# Patient Record
Sex: MALE | Race: WHITE | NOT HISPANIC OR LATINO
[De-identification: names, ages, dates, MRNs, and addresses within clinical notes are randomized per-mention and may not be internally consistent; named-entity substitution may affect disease eponyms.]

---

## 2020-12-10 DIAGNOSIS — Z01.818 ENCOUNTER FOR OTHER PREPROCEDURAL EXAMINATION: ICD-10-CM

## 2020-12-11 ENCOUNTER — APPOINTMENT (OUTPATIENT)
Dept: DISASTER EMERGENCY | Facility: CLINIC | Age: 75
End: 2020-12-11

## 2020-12-13 LAB — SARS-COV-2 N GENE NPH QL NAA+PROBE: NOT DETECTED

## 2020-12-14 ENCOUNTER — INPATIENT (INPATIENT)
Facility: HOSPITAL | Age: 75
LOS: 7 days | Discharge: HOME | DRG: 233 | End: 2020-12-22
Attending: THORACIC SURGERY (CARDIOTHORACIC VASCULAR SURGERY) | Admitting: THORACIC SURGERY (CARDIOTHORACIC VASCULAR SURGERY)
Payer: COMMERCIAL

## 2020-12-14 ENCOUNTER — OUTPATIENT (OUTPATIENT)
Dept: OUTPATIENT SERVICES | Facility: HOSPITAL | Age: 75
LOS: 1 days | Discharge: ANOTHER HOSPITAL | End: 2020-12-14
Payer: MEDICARE

## 2020-12-14 VITALS — HEIGHT: 68 IN | WEIGHT: 182.98 LBS

## 2020-12-14 VITALS
HEIGHT: 68 IN | WEIGHT: 184.31 LBS | HEART RATE: 83 BPM | RESPIRATION RATE: 18 BRPM | OXYGEN SATURATION: 96 % | SYSTOLIC BLOOD PRESSURE: 168 MMHG | TEMPERATURE: 99 F | DIASTOLIC BLOOD PRESSURE: 85 MMHG

## 2020-12-14 DIAGNOSIS — Z98.890 OTHER SPECIFIED POSTPROCEDURAL STATES: Chronic | ICD-10-CM

## 2020-12-14 DIAGNOSIS — I25.721 ATHEROSCLEROSIS OF AUTOLOGOUS ARTERY CORONARY ARTERY BYPASS GRAFT(S) WITH ANGINA PECTORIS WITH DOCUMENTED SPASM: ICD-10-CM

## 2020-12-14 DIAGNOSIS — I10 ESSENTIAL (PRIMARY) HYPERTENSION: ICD-10-CM

## 2020-12-14 DIAGNOSIS — Z90.49 ACQUIRED ABSENCE OF OTHER SPECIFIED PARTS OF DIGESTIVE TRACT: Chronic | ICD-10-CM

## 2020-12-14 DIAGNOSIS — E11.9 TYPE 2 DIABETES MELLITUS WITHOUT COMPLICATIONS: ICD-10-CM

## 2020-12-14 DIAGNOSIS — I25.10 ATHEROSCLEROTIC HEART DISEASE OF NATIVE CORONARY ARTERY WITHOUT ANGINA PECTORIS: ICD-10-CM

## 2020-12-14 LAB
A1C WITH ESTIMATED AVERAGE GLUCOSE RESULT: 7 % — HIGH (ref 4–5.6)
ALBUMIN SERPL ELPH-MCNC: 4.2 G/DL — SIGNIFICANT CHANGE UP (ref 3.3–5)
ALBUMIN SERPL ELPH-MCNC: 4.2 G/DL — SIGNIFICANT CHANGE UP (ref 3.3–5)
ALP SERPL-CCNC: 104 U/L — SIGNIFICANT CHANGE UP (ref 40–120)
ALP SERPL-CCNC: 117 U/L — SIGNIFICANT CHANGE UP (ref 40–120)
ALT FLD-CCNC: 18 U/L — SIGNIFICANT CHANGE UP (ref 10–45)
ALT FLD-CCNC: 23 U/L — SIGNIFICANT CHANGE UP (ref 4–41)
ANION GAP SERPL CALC-SCNC: 10 MMOL/L — SIGNIFICANT CHANGE UP (ref 5–17)
ANION GAP SERPL CALC-SCNC: 14 MMOL/L — SIGNIFICANT CHANGE UP (ref 7–14)
APPEARANCE UR: CLEAR — SIGNIFICANT CHANGE UP
APTT BLD: 29.3 SEC — SIGNIFICANT CHANGE UP (ref 27.5–35.5)
AST SERPL-CCNC: 21 U/L — SIGNIFICANT CHANGE UP (ref 10–40)
AST SERPL-CCNC: 22 U/L — SIGNIFICANT CHANGE UP (ref 4–40)
BASOPHILS # BLD AUTO: 0.03 K/UL — SIGNIFICANT CHANGE UP (ref 0–0.2)
BASOPHILS # BLD AUTO: 0.04 K/UL — SIGNIFICANT CHANGE UP (ref 0–0.2)
BASOPHILS NFR BLD AUTO: 0.7 % — SIGNIFICANT CHANGE UP (ref 0–2)
BASOPHILS NFR BLD AUTO: 0.7 % — SIGNIFICANT CHANGE UP (ref 0–2)
BILIRUB SERPL-MCNC: 0.6 MG/DL — SIGNIFICANT CHANGE UP (ref 0.2–1.2)
BILIRUB SERPL-MCNC: 0.7 MG/DL — SIGNIFICANT CHANGE UP (ref 0.2–1.2)
BILIRUB UR-MCNC: NEGATIVE — SIGNIFICANT CHANGE UP
BLD GP AB SCN SERPL QL: NEGATIVE — SIGNIFICANT CHANGE UP
BUN SERPL-MCNC: 16 MG/DL — SIGNIFICANT CHANGE UP (ref 7–23)
BUN SERPL-MCNC: 18 MG/DL — SIGNIFICANT CHANGE UP (ref 7–23)
CALCIUM SERPL-MCNC: 9.4 MG/DL — SIGNIFICANT CHANGE UP (ref 8.4–10.5)
CALCIUM SERPL-MCNC: 9.5 MG/DL — SIGNIFICANT CHANGE UP (ref 8.4–10.5)
CHLORIDE SERPL-SCNC: 105 MMOL/L — SIGNIFICANT CHANGE UP (ref 98–107)
CHLORIDE SERPL-SCNC: 106 MMOL/L — SIGNIFICANT CHANGE UP (ref 96–108)
CO2 SERPL-SCNC: 23 MMOL/L — SIGNIFICANT CHANGE UP (ref 22–31)
CO2 SERPL-SCNC: 24 MMOL/L — SIGNIFICANT CHANGE UP (ref 22–31)
COLOR SPEC: SIGNIFICANT CHANGE UP
CREAT SERPL-MCNC: 0.99 MG/DL — SIGNIFICANT CHANGE UP (ref 0.5–1.3)
CREAT SERPL-MCNC: 1.14 MG/DL — SIGNIFICANT CHANGE UP (ref 0.5–1.3)
DIFF PNL FLD: NEGATIVE — SIGNIFICANT CHANGE UP
EOSINOPHIL # BLD AUTO: 0.14 K/UL — SIGNIFICANT CHANGE UP (ref 0–0.5)
EOSINOPHIL # BLD AUTO: 0.16 K/UL — SIGNIFICANT CHANGE UP (ref 0–0.5)
EOSINOPHIL NFR BLD AUTO: 2.3 % — SIGNIFICANT CHANGE UP (ref 0–6)
EOSINOPHIL NFR BLD AUTO: 3.7 % — SIGNIFICANT CHANGE UP (ref 0–6)
ESTIMATED AVERAGE GLUCOSE: 154 MG/DL — HIGH (ref 68–114)
FIBRINOGEN PPP-MCNC: 398 MG/DL — SIGNIFICANT CHANGE UP (ref 290–520)
GLUCOSE BLDC GLUCOMTR-MCNC: 141 MG/DL — HIGH (ref 70–99)
GLUCOSE SERPL-MCNC: 132 MG/DL — HIGH (ref 70–99)
GLUCOSE SERPL-MCNC: 156 MG/DL — HIGH (ref 70–99)
GLUCOSE UR QL: NEGATIVE — SIGNIFICANT CHANGE UP
HCT VFR BLD CALC: 43.4 % — SIGNIFICANT CHANGE UP (ref 39–50)
HCT VFR BLD CALC: 43.8 % — SIGNIFICANT CHANGE UP (ref 39–50)
HGB BLD-MCNC: 14.1 G/DL — SIGNIFICANT CHANGE UP (ref 13–17)
HGB BLD-MCNC: 14.5 G/DL — SIGNIFICANT CHANGE UP (ref 13–17)
IANC: 2.21 K/UL — SIGNIFICANT CHANGE UP (ref 1.5–8.5)
IMM GRANULOCYTES NFR BLD AUTO: 0.2 % — SIGNIFICANT CHANGE UP (ref 0–1.5)
IMM GRANULOCYTES NFR BLD AUTO: 0.3 % — SIGNIFICANT CHANGE UP (ref 0–1.5)
INR BLD: 1.02 RATIO — SIGNIFICANT CHANGE UP (ref 0.88–1.16)
KETONES UR-MCNC: NEGATIVE — SIGNIFICANT CHANGE UP
LEUKOCYTE ESTERASE UR-ACNC: NEGATIVE — SIGNIFICANT CHANGE UP
LYMPHOCYTES # BLD AUTO: 1.42 K/UL — SIGNIFICANT CHANGE UP (ref 1–3.3)
LYMPHOCYTES # BLD AUTO: 1.6 K/UL — SIGNIFICANT CHANGE UP (ref 1–3.3)
LYMPHOCYTES # BLD AUTO: 23.8 % — SIGNIFICANT CHANGE UP (ref 13–44)
LYMPHOCYTES # BLD AUTO: 37 % — SIGNIFICANT CHANGE UP (ref 13–44)
MAGNESIUM SERPL-MCNC: 2.1 MG/DL — SIGNIFICANT CHANGE UP (ref 1.6–2.6)
MCHC RBC-ENTMCNC: 28.9 PG — SIGNIFICANT CHANGE UP (ref 27–34)
MCHC RBC-ENTMCNC: 29.2 PG — SIGNIFICANT CHANGE UP (ref 27–34)
MCHC RBC-ENTMCNC: 32.5 GM/DL — SIGNIFICANT CHANGE UP (ref 32–36)
MCHC RBC-ENTMCNC: 33.1 GM/DL — SIGNIFICANT CHANGE UP (ref 32–36)
MCV RBC AUTO: 87.3 FL — SIGNIFICANT CHANGE UP (ref 80–100)
MCV RBC AUTO: 89.9 FL — SIGNIFICANT CHANGE UP (ref 80–100)
MONOCYTES # BLD AUTO: 0.32 K/UL — SIGNIFICANT CHANGE UP (ref 0–0.9)
MONOCYTES # BLD AUTO: 0.41 K/UL — SIGNIFICANT CHANGE UP (ref 0–0.9)
MONOCYTES NFR BLD AUTO: 6.9 % — SIGNIFICANT CHANGE UP (ref 2–14)
MONOCYTES NFR BLD AUTO: 7.4 % — SIGNIFICANT CHANGE UP (ref 2–14)
NEUTROPHILS # BLD AUTO: 2.21 K/UL — SIGNIFICANT CHANGE UP (ref 1.8–7.4)
NEUTROPHILS # BLD AUTO: 3.94 K/UL — SIGNIFICANT CHANGE UP (ref 1.8–7.4)
NEUTROPHILS NFR BLD AUTO: 51 % — SIGNIFICANT CHANGE UP (ref 43–77)
NEUTROPHILS NFR BLD AUTO: 66 % — SIGNIFICANT CHANGE UP (ref 43–77)
NITRITE UR-MCNC: NEGATIVE — SIGNIFICANT CHANGE UP
NRBC # BLD: 0 /100 WBCS — SIGNIFICANT CHANGE UP
NRBC # BLD: 0 /100 WBCS — SIGNIFICANT CHANGE UP (ref 0–0)
NRBC # FLD: 0 K/UL — SIGNIFICANT CHANGE UP
NT-PROBNP SERPL-SCNC: 124 PG/ML — SIGNIFICANT CHANGE UP (ref 0–300)
PH UR: 7 — SIGNIFICANT CHANGE UP (ref 5–8)
PHOSPHATE SERPL-MCNC: 3.6 MG/DL — SIGNIFICANT CHANGE UP (ref 2.5–4.5)
PLATELET # BLD AUTO: 168 K/UL — SIGNIFICANT CHANGE UP (ref 150–400)
PLATELET # BLD AUTO: 170 K/UL — SIGNIFICANT CHANGE UP (ref 150–400)
POTASSIUM SERPL-MCNC: 3.8 MMOL/L — SIGNIFICANT CHANGE UP (ref 3.5–5.3)
POTASSIUM SERPL-MCNC: 4.1 MMOL/L — SIGNIFICANT CHANGE UP (ref 3.5–5.3)
POTASSIUM SERPL-SCNC: 3.8 MMOL/L — SIGNIFICANT CHANGE UP (ref 3.5–5.3)
POTASSIUM SERPL-SCNC: 4.1 MMOL/L — SIGNIFICANT CHANGE UP (ref 3.5–5.3)
PROT SERPL-MCNC: 6.8 G/DL — SIGNIFICANT CHANGE UP (ref 6–8.3)
PROT SERPL-MCNC: 7.8 G/DL — SIGNIFICANT CHANGE UP (ref 6–8.3)
PROT UR-MCNC: SIGNIFICANT CHANGE UP
PROTHROM AB SERPL-ACNC: 12.2 SEC — SIGNIFICANT CHANGE UP (ref 10.6–13.6)
RBC # BLD: 4.83 M/UL — SIGNIFICANT CHANGE UP (ref 4.2–5.8)
RBC # BLD: 5.02 M/UL — SIGNIFICANT CHANGE UP (ref 4.2–5.8)
RBC # FLD: 12.5 % — SIGNIFICANT CHANGE UP (ref 10.3–14.5)
RBC # FLD: 12.5 % — SIGNIFICANT CHANGE UP (ref 10.3–14.5)
RH IG SCN BLD-IMP: POSITIVE — SIGNIFICANT CHANGE UP
SODIUM SERPL-SCNC: 140 MMOL/L — SIGNIFICANT CHANGE UP (ref 135–145)
SODIUM SERPL-SCNC: 142 MMOL/L — SIGNIFICANT CHANGE UP (ref 135–145)
SP GR SPEC: 1.03 — HIGH (ref 1.01–1.02)
UROBILINOGEN FLD QL: NEGATIVE — SIGNIFICANT CHANGE UP
WBC # BLD: 4.33 K/UL — SIGNIFICANT CHANGE UP (ref 3.8–10.5)
WBC # BLD: 5.97 K/UL — SIGNIFICANT CHANGE UP (ref 3.8–10.5)
WBC # FLD AUTO: 4.33 K/UL — SIGNIFICANT CHANGE UP (ref 3.8–10.5)
WBC # FLD AUTO: 5.97 K/UL — SIGNIFICANT CHANGE UP (ref 3.8–10.5)

## 2020-12-14 PROCEDURE — 71046 X-RAY EXAM CHEST 2 VIEWS: CPT | Mod: 26

## 2020-12-14 PROCEDURE — 93010 ELECTROCARDIOGRAM REPORT: CPT

## 2020-12-14 PROCEDURE — 99222 1ST HOSP IP/OBS MODERATE 55: CPT

## 2020-12-14 RX ORDER — AMLODIPINE BESYLATE 2.5 MG/1
10 TABLET ORAL DAILY
Refills: 0 | Status: DISCONTINUED | OUTPATIENT
Start: 2020-12-14 | End: 2020-12-17

## 2020-12-14 RX ORDER — SODIUM CHLORIDE 9 MG/ML
1000 INJECTION INTRAMUSCULAR; INTRAVENOUS; SUBCUTANEOUS
Refills: 0 | Status: DISCONTINUED | OUTPATIENT
Start: 2020-12-14 | End: 2020-12-28

## 2020-12-14 RX ORDER — SIMVASTATIN 20 MG/1
20 TABLET, FILM COATED ORAL AT BEDTIME
Refills: 0 | Status: DISCONTINUED | OUTPATIENT
Start: 2020-12-14 | End: 2020-12-16

## 2020-12-14 RX ORDER — DEXTROSE 50 % IN WATER 50 %
25 SYRINGE (ML) INTRAVENOUS ONCE
Refills: 0 | Status: DISCONTINUED | OUTPATIENT
Start: 2020-12-14 | End: 2020-12-17

## 2020-12-14 RX ORDER — GLUCAGON INJECTION, SOLUTION 0.5 MG/.1ML
1 INJECTION, SOLUTION SUBCUTANEOUS ONCE
Refills: 0 | Status: DISCONTINUED | OUTPATIENT
Start: 2020-12-14 | End: 2020-12-17

## 2020-12-14 RX ORDER — TAMSULOSIN HYDROCHLORIDE 0.4 MG/1
0.4 CAPSULE ORAL AT BEDTIME
Refills: 0 | Status: DISCONTINUED | OUTPATIENT
Start: 2020-12-14 | End: 2020-12-17

## 2020-12-14 RX ORDER — SODIUM CHLORIDE 9 MG/ML
3 INJECTION INTRAMUSCULAR; INTRAVENOUS; SUBCUTANEOUS EVERY 8 HOURS
Refills: 0 | Status: DISCONTINUED | OUTPATIENT
Start: 2020-12-14 | End: 2020-12-28

## 2020-12-14 RX ORDER — INFLUENZA VIRUS VACCINE 15; 15; 15; 15 UG/.5ML; UG/.5ML; UG/.5ML; UG/.5ML
0.5 SUSPENSION INTRAMUSCULAR ONCE
Refills: 0 | Status: DISCONTINUED | OUTPATIENT
Start: 2020-12-14 | End: 2020-12-17

## 2020-12-14 RX ORDER — INSULIN LISPRO 100/ML
VIAL (ML) SUBCUTANEOUS
Refills: 0 | Status: DISCONTINUED | OUTPATIENT
Start: 2020-12-14 | End: 2020-12-17

## 2020-12-14 RX ORDER — DEXTROSE 50 % IN WATER 50 %
12.5 SYRINGE (ML) INTRAVENOUS ONCE
Refills: 0 | Status: DISCONTINUED | OUTPATIENT
Start: 2020-12-14 | End: 2020-12-17

## 2020-12-14 RX ORDER — DEXTROSE 50 % IN WATER 50 %
15 SYRINGE (ML) INTRAVENOUS ONCE
Refills: 0 | Status: DISCONTINUED | OUTPATIENT
Start: 2020-12-14 | End: 2020-12-17

## 2020-12-14 RX ORDER — METOPROLOL TARTRATE 50 MG
25 TABLET ORAL
Refills: 0 | Status: DISCONTINUED | OUTPATIENT
Start: 2020-12-14 | End: 2020-12-17

## 2020-12-14 RX ORDER — SODIUM CHLORIDE 9 MG/ML
3 INJECTION INTRAMUSCULAR; INTRAVENOUS; SUBCUTANEOUS EVERY 8 HOURS
Refills: 0 | Status: DISCONTINUED | OUTPATIENT
Start: 2020-12-14 | End: 2020-12-17

## 2020-12-14 RX ORDER — ASPIRIN/CALCIUM CARB/MAGNESIUM 324 MG
81 TABLET ORAL DAILY
Refills: 0 | Status: DISCONTINUED | OUTPATIENT
Start: 2020-12-15 | End: 2020-12-17

## 2020-12-14 RX ORDER — AMLODIPINE BESYLATE 2.5 MG/1
10 TABLET ORAL ONCE
Refills: 0 | Status: COMPLETED | OUTPATIENT
Start: 2020-12-14 | End: 2020-12-14

## 2020-12-14 RX ADMIN — AMLODIPINE BESYLATE 10 MILLIGRAM(S): 2.5 TABLET ORAL at 14:51

## 2020-12-14 RX ADMIN — SIMVASTATIN 20 MILLIGRAM(S): 20 TABLET, FILM COATED ORAL at 22:40

## 2020-12-14 RX ADMIN — SODIUM CHLORIDE 100 MILLILITER(S): 9 INJECTION INTRAMUSCULAR; INTRAVENOUS; SUBCUTANEOUS at 14:51

## 2020-12-14 RX ADMIN — SODIUM CHLORIDE 3 MILLILITER(S): 9 INJECTION INTRAMUSCULAR; INTRAVENOUS; SUBCUTANEOUS at 22:41

## 2020-12-14 RX ADMIN — SODIUM CHLORIDE 3 MILLILITER(S): 9 INJECTION INTRAMUSCULAR; INTRAVENOUS; SUBCUTANEOUS at 14:51

## 2020-12-14 RX ADMIN — Medication 25 MILLIGRAM(S): at 18:15

## 2020-12-14 RX ADMIN — TAMSULOSIN HYDROCHLORIDE 0.4 MILLIGRAM(S): 0.4 CAPSULE ORAL at 22:40

## 2020-12-14 NOTE — H&P ADULT - NSICDXPASTMEDICALHX_GEN_ALL_CORE_FT
PAST MEDICAL HISTORY:  BPH (benign prostatic hyperplasia)     CAD in native artery     HLD (hyperlipidemia)     HTN (hypertension)     Osteoarthritis     Type 2 diabetes mellitus      PAST MEDICAL HISTORY:  Aneurysm of right leg     BPH (benign prostatic hyperplasia)     CAD in native artery     HLD (hyperlipidemia)     HTN (hypertension)     Osteoarthritis     Type 2 diabetes mellitus

## 2020-12-14 NOTE — H&P ADULT - NSHPLABSRESULTS_GEN_ALL_CORE
Cardiac Cath: Cardiac Cath: EF 55%; LM Distal 50%, LAD Prox 50%Lcx Ostial 80%, OM closed 100%, RCA Closed 100%.

## 2020-12-14 NOTE — H&P ADULT - PROBLEM SELECTOR PLAN 3
Glycemic Control   D/C Metformin to optimize renal function preoperatively  Accuchecks 4 times a day AC and HS with Admelog moderate corrective sliding scale   Continue on DASH/ Diabetic Diet with Steady Carb   Send A1C

## 2020-12-14 NOTE — H&P CARDIOLOGY - NEUROLOGICAL DETAILS
normal strength/sensation intact/cranial nerves intact/alert and oriented x 3/responds to verbal commands/responds to pain

## 2020-12-14 NOTE — H&P CARDIOLOGY - PMH
DM (diabetes mellitus)    HLD (hyperlipidemia)    HTN (hypertension)    OA (osteoarthritis)     BPH (benign prostatic hyperplasia)    DM (diabetes mellitus)    HLD (hyperlipidemia)    HTN (hypertension)    OA (osteoarthritis)

## 2020-12-14 NOTE — H&P CARDIOLOGY - RS GEN PE MLT RESP DETAILS PC
airway patent/no chest wall tenderness/no intercostal retractions/no rales/no wheezes/clear to auscultation bilaterally/respirations non-labored/no rhonchi/breath sounds equal/good air movement

## 2020-12-14 NOTE — H&P CARDIOLOGY - HISTORY OF PRESENT ILLNESS
74 y/o male with a PMHx of HTN, HLD, DM and OA presents for elective cardiac catheterization. Pt has been complaining of intermittent, non-pleuritic, non-radiating, exertional, substernal chest pain for the past couple of months. Pt states that his chest pain is "pressure-like" in origin with a maximum intensity of 7/10 at its worst when he is exerting himself. Pt admits to associated shortness of breath. Pt only admits to these symptoms on exertion with alleviation of his symptoms at rest. Pt was referred by his PCP to cardiologist Dr. Hill, who referred patient for cardiac catheterization given symptoms consistent with stable angina. Pt denies fever, chills, recent travel, headache, dizziness, visual deficits, orthopnea, palpitations, abdominal pain, N/V/D/C, hematochezia, melena, dysuria, hematuria, LOC, syncope, peripheral edema.    Cardiologist: Dr. Claudio Hill  COVID: Negative 12/11 76 y/o male with a PMHx of HTN, HLD, DM, BPH and OA presents for elective cardiac catheterization. Pt has been complaining of intermittent, non-pleuritic, non-radiating, exertional, substernal chest pain for the past couple of months. Pt states that his chest pain is "pressure-like" in origin with a maximum intensity of 7/10 at its worst when he is exerting himself. Pt admits to associated shortness of breath. Pt only admits to these symptoms on exertion with alleviation of his symptoms at rest. Pt was referred by his PCP to cardiologist Dr. Hill, who referred patient for cardiac catheterization given symptoms consistent with stable angina. Pt denies fever, chills, recent travel, headache, dizziness, visual deficits, orthopnea, palpitations, abdominal pain, N/V/D/C, hematochezia, melena, dysuria, hematuria, LOC, syncope, peripheral edema.    Cardiologist: Dr. Claudio Hill  COVID: Negative 12/11

## 2020-12-14 NOTE — H&P ADULT - HISTORY OF PRESENT ILLNESS
History of Present Illness:  75y Male                                             History of Present Illness:    74 yo Male poor historian with PMHx of HTN, HLD, DM2, BPH and OA who presented to [facility] today for an elective cardiac cath.  Patient was seen and evaluated by Cardiologist Dr. Horvath for routine visit and patient was fount to have symptoms of CP with WINN and SOB x 5 months. Cardiac cath finding revealed significant multivessel CAD.  Preliminary cath finding:  EF 55%; LM Distal 50%, LAD Prox 50%Lcx Ostial 80%, OM closed 100%, RCA Closed 100%. Patient transferred to [facility] for cardiac surgery evaluation with Dr. Hairston. At this time patient reports asymptomatic denies CP, palpitation, SOB, WINN, HA, dizziness, N/V/D, fever or chills.  No acute event noted overnight.

## 2020-12-14 NOTE — H&P ADULT - ASSESSMENT
74 yo Male poor historian with PMHx of HTN, HLD, DM2, BPH and OA who presented to [facility] today for an elective cardiac cath.  Patient was seen and evaluated by Cardiologist Dr. Horvath for routine visit and patient was fount to have symptoms of CP with WINN and SOB x 5 months. Cardiac cath finding revealed significant multivessel CAD.  Preliminary cath finding:  EF 55%; LM Distal 50%, LAD Prox 50%Lcx Ostial 80%, OM closed 100%, RCA Closed 100%. Patient transferred to [facility] for cardiac surgery evaluation with Dr. Hairston.     Hospital Course:   12/14 Admit to 2 [facility] Telemetry floor.  Cardiac surgery work up in progress. Started on Lopressor 25 mg PO BID.      76 yo Male poor historian with PMHx of HTN, HLD, DM2, BPH and OA who presented to [facility] today for an elective cardiac cath.  Patient was seen and evaluated by Cardiologist Dr. Horvath for routine visit and patient was fount to have symptoms of CP with WINN and SOB x 5 months. Cardiac cath finding revealed significant multivessel CAD.  Preliminary cath finding:  EF 55%; LM Distal 50%, LAD Prox 50%Lcx Ostial 80%, OM closed 100%, RCA Closed 100%. Patient transferred to [facility] for cardiac surgery evaluation with Dr. Hairston.     Hospital Course:   12/14 Admit to 2 [facility] Telemetry floor.  Cardiac surgery work up in progress. Started on Lopressor 25 mg PO BID. Plan for Cardiac Surgery on Thursday 12/16 with Dr. Hairston.

## 2020-12-14 NOTE — H&P ADULT - NSHPPHYSICALEXAM_GEN_ALL_CORE
General: Well nourished, well developed, no acute distress.                                                         Neuro: Normal exam oriented to person/place & time with no focal motor or sensory  deficits.                    Eyes: Normal exam of conjunctiva & lids, pupils equally reactive.   ENT: Normal exam of nasal/oral mucosa with absence of cyanosis.   Neck: Normal exam of jugular veins, trachea & thyroid.   Chest: Normal lung exam with good air movement absence of wheezes, rales, or rhonchi:                                                                          CV:  Auscultation: normal [ ] S3[ ] S4[ ] Irregular [ ] Rub[ ] Clicks[ ]  Murmurs none:[ ]systolic [ ]  diastolic [ ] holosystolic [ ]  Carotids: No Bruits[ ] Other____________ Abdominal Aorta: normal [ ] nonpalpable[ ]                                                                         GI: Normal exam of abdomen, liver & spleen with no noted masses or tenderness.  (+) BS X 4 Quadrants, Nontender / Non Distended.                                                                                             Extremities: Normal no evidence of cyanosis or deformity Edema: none[ ]trace[ ]1+[ ]2+[ ]3+[ ]4+[ ]  Lower Extremity Pulses: Right[ ] Left[ ]Varicosities[ ]  SKIN : Normal exam to inspection & palpation. General: Well nourished, well developed, no acute distress.                                                         Neuro: Normal exam oriented to person/place & time with no focal motor or sensory  deficits.                    Eyes: Normal exam of conjunctiva & lids, pupils equally reactive.   ENT: Normal exam of nasal/oral mucosa with absence of cyanosis.   Neck: Normal exam of jugular veins, trachea & thyroid.   Chest: Normal lung exam with good air movement absence of wheezes, rales, or rhonchi:                                                                          CV: Auscultation: normal [X ] S3[ ] S4[ ] Irregular [ ] Rub[ ] Clicks[ ]  Murmurs none:[X ]systolic [ ]  diastolic [ ] holosystolic [ ]  Carotids: No Bruits[- ] Other____________ Abdominal Aorta: normal [X ] nonpalpable[X]                                                                         GI: Normal exam of abdomen, liver & spleen with no noted masses or tenderness.  (+) BS X 4 Quadrants, Nontender / Non Distended.             Extremities: Normal no evidence of cyanosis or deformity Edema: none[X ]trace[ ]1+[ ]2+[ ]3+[ ]4+[ ]  Lower Extremity Pulses: Right[+2 ] Left[+2 ]Varicosities[-]  SKIN : Normal exam to inspection & palpation.

## 2020-12-14 NOTE — H&P ADULT - PROBLEM SELECTOR PLAN 2
Continue on Norvasc 10 mg PO daily   Started on lopressor 25 mg PO BID   Ace inhibitor D/C to optimize renal function preoperatively

## 2020-12-14 NOTE — H&P ADULT - PROBLEM SELECTOR PLAN 1
Pre op Cardiac surgery work up in progress   Continue with ASA 81 mg PO daily   Start on Lopressor 25 mg PO BID   Continue with Zocor 20 mg PO HS   Type and Screen x 1   TTE   CXR PA/LA   Carotid Duplex Study   MRSA / MSSA nasal swab   Send Covid PCR in AM   Plan for Cardiac Surgery on Thursday 12/16 with Dr. Hairston

## 2020-12-14 NOTE — H&P ADULT - NSHPREVIEWOFSYSTEMS_GEN_ALL_CORE
Review of Systems  GENERAL:  Fevers[] chills[] sweats[] fatigue[] weight loss[] weight gain []                                        NEURO:  parathesias[] seizures []  syncope []  confusion []                                                                                  EYES: glasses[]  blurry vision[]  discharge[] pain[] glaucoma []                                                                            ENMT:  difficulty hearing []  vertigo[]  dysphagia[] epistaxis[] recent dental work []                                      CV:  chest pain[] palpitations[] WINN [] diaphoresis [] edema[]                                                                                             RESPIRATORY:  wheezing[] SOB[] cough [] sputum[] hemoptysis[]                                                                    GI:  nausea[]  vomiting []  diarrhea[] constipation [] melena []                                                                        : hematuria[ ]  dysuria[ ] urgency[] incontinence[]                                                                                              MUSCULOSKELETAL:  arthritis[ ]  joint swelling [ ] muscle weakness [ ]                                                                  SKIN/BREAST:  rash[ ] itching [ ]  hair loss[ ] masses[ ]                                                                                                PSYCH:  dementia [ ] depression [ ] anxiety[ ]                                                                                                                  HEME/LYMPH:  bruises easily[ ] enlarged lymph nodes[ ] tender lymph nodes[ ]                                                 ENDOCRINE:  cold intolerance[ ] heat intolerance[ ] polydipsia[ ] Review of Systems  GENERAL:  Fevers[] chills[] sweats[] fatigue[] weight loss[] weight gain []                                        NEURO:  parathesias[] seizures []  syncope []  confusion []                                                                                  EYES: glasses[]  blurry vision[]  discharge[] pain[] glaucoma []                                                                            ENMT:  difficulty hearing []  vertigo[]  dysphagia[] epistaxis[] recent dental work []                                      CV:  chest pain[X] palpitations[] WINN [X] diaphoresis [] edema[]                                                                                             RESPIRATORY:  wheezing[] SOB[X] cough [] sputum[] hemoptysis[]                                                                    GI:  nausea[]  vomiting []  diarrhea[] constipation [] melena []                                                                        : hematuria[ ]  dysuria[ ] urgency[] incontinence[]                                                                                              MUSCULOSKELETAL:  arthritis[ ]  joint swelling [ ] muscle weakness [ ]                                                                  SKIN/BREAST:  rash[ ] itching [ ]  hair loss[ ] masses[ ]                                                                                                PSYCH:  dementia [ ] depression [ ] anxiety[ ]                                                                                                                  HEME/LYMPH:  bruises easily[ ] enlarged lymph nodes[ ] tender lymph nodes[ ]                                                 ENDOCRINE:  cold intolerance[ ] heat intolerance[ ] polydipsia[ ]

## 2020-12-15 LAB
A1C WITH ESTIMATED AVERAGE GLUCOSE RESULT: 6.9 % — HIGH (ref 4–5.6)
ALBUMIN SERPL ELPH-MCNC: 4 G/DL — SIGNIFICANT CHANGE UP (ref 3.3–5)
ALP SERPL-CCNC: 89 U/L — SIGNIFICANT CHANGE UP (ref 40–120)
ALT FLD-CCNC: 15 U/L — SIGNIFICANT CHANGE UP (ref 10–45)
ANION GAP SERPL CALC-SCNC: 13 MMOL/L — SIGNIFICANT CHANGE UP (ref 5–17)
AST SERPL-CCNC: 19 U/L — SIGNIFICANT CHANGE UP (ref 10–40)
BILIRUB SERPL-MCNC: 0.9 MG/DL — SIGNIFICANT CHANGE UP (ref 0.2–1.2)
BLD GP AB SCN SERPL QL: NEGATIVE — SIGNIFICANT CHANGE UP
BUN SERPL-MCNC: 18 MG/DL — SIGNIFICANT CHANGE UP (ref 7–23)
CALCIUM SERPL-MCNC: 9.1 MG/DL — SIGNIFICANT CHANGE UP (ref 8.4–10.5)
CHLORIDE SERPL-SCNC: 106 MMOL/L — SIGNIFICANT CHANGE UP (ref 96–108)
CO2 SERPL-SCNC: 21 MMOL/L — LOW (ref 22–31)
CREAT SERPL-MCNC: 1.09 MG/DL — SIGNIFICANT CHANGE UP (ref 0.5–1.3)
ESTIMATED AVERAGE GLUCOSE: 151 MG/DL — HIGH (ref 68–114)
GLUCOSE BLDC GLUCOMTR-MCNC: 126 MG/DL — HIGH (ref 70–99)
GLUCOSE BLDC GLUCOMTR-MCNC: 139 MG/DL — HIGH (ref 70–99)
GLUCOSE BLDC GLUCOMTR-MCNC: 140 MG/DL — HIGH (ref 70–99)
GLUCOSE BLDC GLUCOMTR-MCNC: 163 MG/DL — HIGH (ref 70–99)
GLUCOSE SERPL-MCNC: 126 MG/DL — HIGH (ref 70–99)
HCT VFR BLD CALC: 42.7 % — SIGNIFICANT CHANGE UP (ref 39–50)
HGB BLD-MCNC: 14.1 G/DL — SIGNIFICANT CHANGE UP (ref 13–17)
MAGNESIUM SERPL-MCNC: 2 MG/DL — SIGNIFICANT CHANGE UP (ref 1.6–2.6)
MCHC RBC-ENTMCNC: 29.3 PG — SIGNIFICANT CHANGE UP (ref 27–34)
MCHC RBC-ENTMCNC: 33 GM/DL — SIGNIFICANT CHANGE UP (ref 32–36)
MCV RBC AUTO: 88.8 FL — SIGNIFICANT CHANGE UP (ref 80–100)
MRSA PCR RESULT.: SIGNIFICANT CHANGE UP
NRBC # BLD: 0 /100 WBCS — SIGNIFICANT CHANGE UP (ref 0–0)
PA ADP PRP-ACNC: 198 PRU — SIGNIFICANT CHANGE UP (ref 194–417)
PLATELET # BLD AUTO: 150 K/UL — SIGNIFICANT CHANGE UP (ref 150–400)
POTASSIUM SERPL-MCNC: 3.5 MMOL/L — SIGNIFICANT CHANGE UP (ref 3.5–5.3)
POTASSIUM SERPL-SCNC: 3.5 MMOL/L — SIGNIFICANT CHANGE UP (ref 3.5–5.3)
PROT SERPL-MCNC: 6.5 G/DL — SIGNIFICANT CHANGE UP (ref 6–8.3)
RBC # BLD: 4.81 M/UL — SIGNIFICANT CHANGE UP (ref 4.2–5.8)
RBC # FLD: 12.3 % — SIGNIFICANT CHANGE UP (ref 10.3–14.5)
RH IG SCN BLD-IMP: POSITIVE — SIGNIFICANT CHANGE UP
S AUREUS DNA NOSE QL NAA+PROBE: SIGNIFICANT CHANGE UP
SODIUM SERPL-SCNC: 140 MMOL/L — SIGNIFICANT CHANGE UP (ref 135–145)
T3 SERPL-MCNC: 99 NG/DL — SIGNIFICANT CHANGE UP (ref 80–200)
T4 AB SER-ACNC: 6.6 UG/DL — SIGNIFICANT CHANGE UP (ref 4.6–12)
TSH SERPL-MCNC: 0.68 UIU/ML — SIGNIFICANT CHANGE UP (ref 0.27–4.2)
WBC # BLD: 4.43 K/UL — SIGNIFICANT CHANGE UP (ref 3.8–10.5)
WBC # FLD AUTO: 4.43 K/UL — SIGNIFICANT CHANGE UP (ref 3.8–10.5)

## 2020-12-15 PROCEDURE — 93306 TTE W/DOPPLER COMPLETE: CPT | Mod: 26

## 2020-12-15 PROCEDURE — 93880 EXTRACRANIAL BILAT STUDY: CPT | Mod: 26

## 2020-12-15 PROCEDURE — 99232 SBSQ HOSP IP/OBS MODERATE 35: CPT

## 2020-12-15 PROCEDURE — 93010 ELECTROCARDIOGRAM REPORT: CPT

## 2020-12-15 PROCEDURE — 94010 BREATHING CAPACITY TEST: CPT | Mod: 26

## 2020-12-15 RX ORDER — HEPARIN SODIUM 5000 [USP'U]/ML
5000 INJECTION INTRAVENOUS; SUBCUTANEOUS EVERY 8 HOURS
Refills: 0 | Status: DISCONTINUED | OUTPATIENT
Start: 2020-12-15 | End: 2020-12-17

## 2020-12-15 RX ORDER — POTASSIUM CHLORIDE 20 MEQ
40 PACKET (EA) ORAL ONCE
Refills: 0 | Status: COMPLETED | OUTPATIENT
Start: 2020-12-15 | End: 2020-12-15

## 2020-12-15 RX ADMIN — Medication 40 MILLIEQUIVALENT(S): at 11:40

## 2020-12-15 RX ADMIN — Medication 25 MILLIGRAM(S): at 17:01

## 2020-12-15 RX ADMIN — HEPARIN SODIUM 5000 UNIT(S): 5000 INJECTION INTRAVENOUS; SUBCUTANEOUS at 21:23

## 2020-12-15 RX ADMIN — TAMSULOSIN HYDROCHLORIDE 0.4 MILLIGRAM(S): 0.4 CAPSULE ORAL at 21:23

## 2020-12-15 RX ADMIN — HEPARIN SODIUM 5000 UNIT(S): 5000 INJECTION INTRAVENOUS; SUBCUTANEOUS at 14:11

## 2020-12-15 RX ADMIN — SIMVASTATIN 20 MILLIGRAM(S): 20 TABLET, FILM COATED ORAL at 21:23

## 2020-12-15 RX ADMIN — SODIUM CHLORIDE 3 MILLILITER(S): 9 INJECTION INTRAMUSCULAR; INTRAVENOUS; SUBCUTANEOUS at 13:10

## 2020-12-15 RX ADMIN — SODIUM CHLORIDE 3 MILLILITER(S): 9 INJECTION INTRAMUSCULAR; INTRAVENOUS; SUBCUTANEOUS at 06:06

## 2020-12-15 RX ADMIN — Medication 2: at 11:43

## 2020-12-15 RX ADMIN — SODIUM CHLORIDE 3 MILLILITER(S): 9 INJECTION INTRAMUSCULAR; INTRAVENOUS; SUBCUTANEOUS at 21:20

## 2020-12-15 RX ADMIN — Medication 25 MILLIGRAM(S): at 06:07

## 2020-12-15 RX ADMIN — AMLODIPINE BESYLATE 10 MILLIGRAM(S): 2.5 TABLET ORAL at 06:06

## 2020-12-15 RX ADMIN — Medication 81 MILLIGRAM(S): at 11:40

## 2020-12-15 NOTE — PROGRESS NOTE ADULT - SUBJECTIVE AND OBJECTIVE BOX
Subjective: Pt states "Hello" denies any CP or SOB. No acute events overnight.     Telemetry:  SR 60 - 90  Vital Signs Last 24 Hrs  T(C): 36.4 (12-15-20 @ 11:56), Max: 37 (20 @ 17:37)  T(F): 97.5 (12-15-20 @ 11:56), Max: 98.6 (20 @ 17:37)  HR: 70 (12-15-20 @ 11:56) (64 - 83)  BP: 165/83 (12-15-20 @ 11:56) (130/82 - 168/85)  RR: 18 (12-15-20 @ 11:56) (16 - 18)  SpO2: 94% (12-15-20 @ 11:56) (94% - 97%)              @ 07:01  -  12-15 @ 07:00  --------------------------------------------------------  IN: 400 mL / OUT: 1150 mL / NET: -750 mL      Daily Weight in k.5 (15 Dec 2020 08:08)                        14.1   4.43  )-----------( 150      ( 15 Dec 2020 06:39 )             42.7     140  |  106  |  18  ----------------------------<  126<H>  3.5   |  21<L>  |  1.09    Mg     2.0     12-15    AST  19  /  ALT  15  /  AlkPhos  89  12-15        CAPILLARY BLOOD GLUCOSE  126 - 163            PHYSICAL EXAM  Neurology: A&Ox3, NAD  CV : RRR+S1S2  Lungs: Respirations non-labored, B/L BS CTA  Abdomen: Soft, NT/ND, +BSx4Q, last BM   (-)N/V/D  : Voiding without difficulty  Extremities: B/L LE no edema, negative calf tenderness, +PP      MEDICATIONS  amLODIPine   Tablet 10 milliGRAM(s) Oral daily  aspirin enteric coated 81 milliGRAM(s) Oral daily  heparin   Injectable 5000 Unit(s) SubCutaneous every 8 hours  influenza   Vaccine 0.5 milliLiter(s) IntraMuscular once  insulin lispro (ADMELOG) corrective regimen sliding scale   SubCutaneous Before meals and at bedtime  metoprolol tartrate 25 milliGRAM(s) Oral two times a day  simvastatin 20 milliGRAM(s) Oral at bedtime  sodium chloride 0.9% lock flush 3 milliLiter(s) IV Push every 8 hours  tamsulosin 0.4 milliGRAM(s) Oral at bedtime        Discussed with Cardiothoracic Team at AM rounds.

## 2020-12-15 NOTE — PROGRESS NOTE ADULT - ASSESSMENT
76 yo Male poor historian with PMHx of HTN, HLD, DM2, BPH and OA who presented to [facility] today for an elective cardiac cath.  Patient was seen and evaluated by Cardiologist Dr. Horvath for routine visit and patient was fount to have symptoms of CP with WINN and SOB x 5 months. Cardiac cath finding revealed significant multivessel CAD.  Preliminary cath finding:  EF 55%; LM Distal 50%, LAD Prox 50%Lcx Ostial 80%, OM closed 100%, RCA Closed 100%. Patient transferred to [facility] for cardiac surgery evaluation with Dr. Hairston.     Hospital Course:   12/14 Admit to 2 [facility] Telemetry floor.  Cardiac surgery work up in progress. Started on Lopressor 25 mg PO BID. Plan for Cardiac Surgery on Thursday 12/16 with Dr. Hairston.   12/15 VSS, P2Y12 198, K3.5 supplemented - plan for CABG 12/16. Carotids negative.

## 2020-12-15 NOTE — CONSULT NOTE ADULT - SUBJECTIVE AND OBJECTIVE BOX
CHIEF COMPLAINT: cp    HISTORY OF PRESENT ILLNESS:  76 yo Male poor historian with PMHx of HTN, HLD, DM2, BPH and OA who presented to [facility] today for an elective cardiac cath.  Patient was seen and evaluated by Cardiologist Dr. Horvath for routine visit and patient was fount to have symptoms of CP with WINN and SOB x 5 months. Cardiac cath finding revealed significant multivessel CAD.  Preliminary cath finding:  EF 55%; LM Distal 50%, LAD Prox 50%Lcx Ostial 80%, OM closed 100%, RCA Closed 100%. Patient transferred to [facility] for cardiac surgery evaluation with Dr. Hairston. At this time patient reports asymptomatic denies CP, palpitation, SOB, WINN, HA, dizziness, N/V/D, fever or chills.  No acute event noted overnight.     Allergies  No Known Allergies      MEDICATIONS:  amLODIPine   Tablet 10 milliGRAM(s) Oral daily  aspirin enteric coated 81 milliGRAM(s) Oral daily  metoprolol tartrate 25 milliGRAM(s) Oral two times a day  tamsulosin 0.4 milliGRAM(s) Oral at bedtime  dextrose 40% Gel 15 Gram(s) Oral once  dextrose 50% Injectable 25 Gram(s) IV Push once  dextrose 50% Injectable 12.5 Gram(s) IV Push once  dextrose 50% Injectable 25 Gram(s) IV Push once  glucagon  Injectable 1 milliGRAM(s) IntraMuscular once  insulin lispro (ADMELOG) corrective regimen sliding scale   SubCutaneous Before meals and at bedtime  simvastatin 20 milliGRAM(s) Oral at bedtime  influenza   Vaccine 0.5 milliLiter(s) IntraMuscular once  sodium chloride 0.9% lock flush 3 milliLiter(s) IV Push every 8 hours      PAST MEDICAL & SURGICAL HISTORY:  Aneurysm of right leg    BPH (benign prostatic hyperplasia)    Osteoarthritis    Type 2 diabetes mellitus    HLD (hyperlipidemia)    HTN (hypertension)    CAD in native artery    S/P appendectomy    H/O left inguinal hernia repair        FAMILY HISTORY:  No pertinent family history in first degree relatives        SOCIAL HISTORY:    former smoker. indep in adl    REVIEW OF SYSTEMS:  See HPI, otherwise complete 10 point review of systems negative    [ ] All others negative	      PHYSICAL EXAM:  T(C): 36.7 (12-15-20 @ 05:30), Max: 37 (12-14-20 @ 17:37)  HR: 75 (12-15-20 @ 05:30) (64 - 83)  BP: 130/82 (12-15-20 @ 05:30) (130/82 - 168/85)  RR: 16 (12-15-20 @ 05:30) (16 - 18)  SpO2: 96% (12-15-20 @ 05:30) (96% - 97%)  Wt(kg): --  I&O's Summary    14 Dec 2020 07:01  -  15 Dec 2020 07:00  --------------------------------------------------------  IN: 400 mL / OUT: 1150 mL / NET: -750 mL        Appearance: No Acute Distress	  HEENT:  Normal oral mucosa, PERRL, EOMI	  Cardiovascular: Normal S1 S2, No JVD, No murmurs/rubs/gallops  Respiratory: Lungs clear to auscultation bilaterally  Gastrointestinal:  Soft, Non-tender, + BS	  Skin: No rashes, No ecchymoses, No cyanosis	  Neurologic: Non-focal  Extremities: No clubbing, cyanosis or edema  Vascular: Peripheral pulses palpable 2+ bilaterally  Psychiatry: A & O x 3, Mood & affect appropriate    Laboratory Data:	 	    CBC Full  -  ( 15 Dec 2020 06:39 )  WBC Count : 4.43 K/uL  Hemoglobin : 14.1 g/dL  Hematocrit : 42.7 %  Platelet Count - Automated : 150 K/uL  Mean Cell Volume : 88.8 fl  Mean Cell Hemoglobin : 29.3 pg  Mean Cell Hemoglobin Concentration : 33.0 gm/dL  Auto Neutrophil # : x  Auto Lymphocyte # : x  Auto Monocyte # : x  Auto Eosinophil # : x  Auto Basophil # : x  Auto Neutrophil % : x  Auto Lymphocyte % : x  Auto Monocyte % : x  Auto Eosinophil % : x  Auto Basophil % : x    12-15    140  |  106  |  18  ----------------------------<  126<H>  3.5   |  21<L>  |  1.09  12-14    140  |  106  |  16  ----------------------------<  132<H>  4.1   |  24  |  1.14    Ca    9.1      15 Dec 2020 06:38  Ca    9.4      14 Dec 2020 18:56  Mg     2.0     12-15    TPro  6.5  /  Alb  4.0  /  TBili  0.9  /  DBili  x   /  AST  19  /  ALT  15  /  AlkPhos  89  12-15  TPro  6.8  /  Alb  4.2  /  TBili  0.7  /  DBili  x   /  AST  21  /  ALT  18  /  AlkPhos  104  12-14      proBNP: Serum Pro-Brain Natriuretic Peptide: 124 pg/mL (12-14 @ 18:56)    Lipid Profile:   HgA1c:   TSH: Thyroid Stimulating Hormone, Serum: 0.68 uIU/mL (12-15 @ 02:14)        Interpretation of Telemetry: 	nsr    ECG:  	not uploaded      Assessment:  -angina s/p lhc c/w mvd EF 55%; LM Distal 50%, LAD Prox 50%Lcx Ostial 80%, OM closed 100%, RCA Closed 100%. P  -DM  -HTN    Recs:  cabg eval underway  cw asa statin and beta blockers. would dc zocor and start lipitor 80  tele monitoring      Greater than 60 minutes spent on total encounter; more than 50% of the visit was spent counseling and/or coordinating care by the attending physician.   	  Singh Hill MD   Cardiovascular Diseases  [phone]

## 2020-12-16 ENCOUNTER — TRANSCRIPTION ENCOUNTER (OUTPATIENT)
Age: 75
End: 2020-12-16

## 2020-12-16 LAB
ANION GAP SERPL CALC-SCNC: 11 MMOL/L — SIGNIFICANT CHANGE UP (ref 5–17)
BUN SERPL-MCNC: 26 MG/DL — HIGH (ref 7–23)
CALCIUM SERPL-MCNC: 9.3 MG/DL — SIGNIFICANT CHANGE UP (ref 8.4–10.5)
CHLORIDE SERPL-SCNC: 107 MMOL/L — SIGNIFICANT CHANGE UP (ref 96–108)
CO2 SERPL-SCNC: 21 MMOL/L — LOW (ref 22–31)
CREAT SERPL-MCNC: 1.19 MG/DL — SIGNIFICANT CHANGE UP (ref 0.5–1.3)
GLUCOSE BLDC GLUCOMTR-MCNC: 137 MG/DL — HIGH (ref 70–99)
GLUCOSE BLDC GLUCOMTR-MCNC: 157 MG/DL — HIGH (ref 70–99)
GLUCOSE BLDC GLUCOMTR-MCNC: 161 MG/DL — HIGH (ref 70–99)
GLUCOSE BLDC GLUCOMTR-MCNC: 182 MG/DL — HIGH (ref 70–99)
GLUCOSE SERPL-MCNC: 123 MG/DL — HIGH (ref 70–99)
HCT VFR BLD CALC: 40.8 % — SIGNIFICANT CHANGE UP (ref 39–50)
HGB BLD-MCNC: 13.8 G/DL — SIGNIFICANT CHANGE UP (ref 13–17)
MCHC RBC-ENTMCNC: 29.8 PG — SIGNIFICANT CHANGE UP (ref 27–34)
MCHC RBC-ENTMCNC: 33.8 GM/DL — SIGNIFICANT CHANGE UP (ref 32–36)
MCV RBC AUTO: 88.1 FL — SIGNIFICANT CHANGE UP (ref 80–100)
NRBC # BLD: 0 /100 WBCS — SIGNIFICANT CHANGE UP (ref 0–0)
PLATELET # BLD AUTO: 144 K/UL — LOW (ref 150–400)
POTASSIUM SERPL-MCNC: 4 MMOL/L — SIGNIFICANT CHANGE UP (ref 3.5–5.3)
POTASSIUM SERPL-SCNC: 4 MMOL/L — SIGNIFICANT CHANGE UP (ref 3.5–5.3)
RBC # BLD: 4.63 M/UL — SIGNIFICANT CHANGE UP (ref 4.2–5.8)
RBC # FLD: 12.5 % — SIGNIFICANT CHANGE UP (ref 10.3–14.5)
SARS-COV-2 IGG SERPL QL IA: NEGATIVE — SIGNIFICANT CHANGE UP
SARS-COV-2 IGM SERPL IA-ACNC: <0.1 INDEX — SIGNIFICANT CHANGE UP
SARS-COV-2 RNA SPEC QL NAA+PROBE: SIGNIFICANT CHANGE UP
SODIUM SERPL-SCNC: 139 MMOL/L — SIGNIFICANT CHANGE UP (ref 135–145)
WBC # BLD: 4.84 K/UL — SIGNIFICANT CHANGE UP (ref 3.8–10.5)
WBC # FLD AUTO: 4.84 K/UL — SIGNIFICANT CHANGE UP (ref 3.8–10.5)

## 2020-12-16 PROCEDURE — 99232 SBSQ HOSP IP/OBS MODERATE 35: CPT | Mod: 25

## 2020-12-16 RX ORDER — CHLORHEXIDINE GLUCONATE 213 G/1000ML
1 SOLUTION TOPICAL ONCE
Refills: 0 | Status: COMPLETED | OUTPATIENT
Start: 2020-12-16 | End: 2020-12-16

## 2020-12-16 RX ORDER — CHLORHEXIDINE GLUCONATE 213 G/1000ML
30 SOLUTION TOPICAL ONCE
Refills: 0 | Status: COMPLETED | OUTPATIENT
Start: 2020-12-16 | End: 2020-12-17

## 2020-12-16 RX ORDER — CHLORHEXIDINE GLUCONATE 213 G/1000ML
1 SOLUTION TOPICAL ONCE
Refills: 0 | Status: COMPLETED | OUTPATIENT
Start: 2020-12-17 | End: 2020-12-17

## 2020-12-16 RX ORDER — CEFUROXIME AXETIL 250 MG
1500 TABLET ORAL ONCE
Refills: 0 | Status: DISCONTINUED | OUTPATIENT
Start: 2020-12-16 | End: 2020-12-17

## 2020-12-16 RX ORDER — ATORVASTATIN CALCIUM 80 MG/1
80 TABLET, FILM COATED ORAL AT BEDTIME
Refills: 0 | Status: DISCONTINUED | OUTPATIENT
Start: 2020-12-16 | End: 2020-12-17

## 2020-12-16 RX ADMIN — HEPARIN SODIUM 5000 UNIT(S): 5000 INJECTION INTRAVENOUS; SUBCUTANEOUS at 14:04

## 2020-12-16 RX ADMIN — SODIUM CHLORIDE 3 MILLILITER(S): 9 INJECTION INTRAMUSCULAR; INTRAVENOUS; SUBCUTANEOUS at 05:28

## 2020-12-16 RX ADMIN — Medication 2: at 16:53

## 2020-12-16 RX ADMIN — HEPARIN SODIUM 5000 UNIT(S): 5000 INJECTION INTRAVENOUS; SUBCUTANEOUS at 21:58

## 2020-12-16 RX ADMIN — Medication 25 MILLIGRAM(S): at 18:06

## 2020-12-16 RX ADMIN — CHLORHEXIDINE GLUCONATE 1 APPLICATION(S): 213 SOLUTION TOPICAL at 21:35

## 2020-12-16 RX ADMIN — SODIUM CHLORIDE 3 MILLILITER(S): 9 INJECTION INTRAMUSCULAR; INTRAVENOUS; SUBCUTANEOUS at 21:35

## 2020-12-16 RX ADMIN — Medication 2: at 07:53

## 2020-12-16 RX ADMIN — Medication 81 MILLIGRAM(S): at 14:04

## 2020-12-16 RX ADMIN — Medication 25 MILLIGRAM(S): at 05:29

## 2020-12-16 RX ADMIN — Medication 2: at 11:44

## 2020-12-16 RX ADMIN — ATORVASTATIN CALCIUM 80 MILLIGRAM(S): 80 TABLET, FILM COATED ORAL at 21:58

## 2020-12-16 RX ADMIN — AMLODIPINE BESYLATE 10 MILLIGRAM(S): 2.5 TABLET ORAL at 05:29

## 2020-12-16 RX ADMIN — HEPARIN SODIUM 5000 UNIT(S): 5000 INJECTION INTRAVENOUS; SUBCUTANEOUS at 05:28

## 2020-12-16 RX ADMIN — TAMSULOSIN HYDROCHLORIDE 0.4 MILLIGRAM(S): 0.4 CAPSULE ORAL at 21:58

## 2020-12-16 RX ADMIN — SODIUM CHLORIDE 3 MILLILITER(S): 9 INJECTION INTRAMUSCULAR; INTRAVENOUS; SUBCUTANEOUS at 14:07

## 2020-12-16 NOTE — PROGRESS NOTE ADULT - ASSESSMENT
76 yo Male poor historian with PMHx of HTN, HLD, DM2, BPH and OA who presented to [facility] today for an elective cardiac cath.  Patient was seen and evaluated by Cardiologist Dr. Horvath for routine visit and patient was fount to have symptoms of CP with WINN and SOB x 5 months. Cardiac cath finding revealed significant multivessel CAD.  Preliminary cath finding:  EF 55%; LM Distal 50%, LAD Prox 50%Lcx Ostial 80%, OM closed 100%, RCA Closed 100%. Patient transferred to [facility] for cardiac surgery evaluation with Dr. Hairston.     Hospital Course:   12/14 Admit to 2 [facility] Telemetry floor.  Cardiac surgery work up in progress. Started on Lopressor 25 mg PO BID. Plan for Cardiac Surgery on Thursday 12/17 with Dr. Hairston.   12/15 VSS, P2Y12 198, K3.5 supplemented - plan for CABG 12/17. Carotids negative.  12/16 VSS, NPO p MN for OR in AM, check COVID swab today.

## 2020-12-16 NOTE — PROGRESS NOTE ADULT - SUBJECTIVE AND OBJECTIVE BOX
Cardiovascular Disease Progress Note    Overnight events: No acute events overnight.  awaiting or thurs. no cp/sob/palps/dizziness  Otherwise review of systems negative    Objective Findings:  T(C): 36.4 (20 @ 05:01), Max: 36.4 (12-15-20 @ 11:56)  HR: 75 (20 @ 05:01) (70 - 90)  BP: 136/70 (20 @ 05:01) (134/71 - 165/83)  RR: 18 (20 @ 05:01) (18 - 18)  SpO2: 97% (20 @ 05:01) (94% - 97%)  Wt(kg): --  Daily     Daily Weight in k.5 (15 Dec 2020 08:08)      Physical Exam:  Gen: NAD  HEENT: EOMI  CV: RRR, normal S1 + S2, no m/r/g  Lungs: CTAB  Abd: soft, non-tender  Ext: No edema    Telemetry: nsr    Laboratory Data:                        13.8   4.84  )-----------( 144      ( 16 Dec 2020 05:34 )             40.8     12-16    139  |  107  |  26<H>  ----------------------------<  123<H>  4.0   |  21<L>  |  1.19    Ca    9.3      16 Dec 2020 05:34  Phos  3.6     12-14  Mg     2.0     12-15    TPro  6.5  /  Alb  4.0  /  TBili  0.9  /  DBili  x   /  AST  19  /  ALT  15  /  AlkPhos  89  12-15    PT/INR - ( 14 Dec 2020 18:56 )   PT: 12.2 sec;   INR: 1.02 ratio         PTT - ( 14 Dec 2020 18:56 )  PTT:29.3 sec          Inpatient Medications:  MEDICATIONS  (STANDING):  amLODIPine   Tablet 10 milliGRAM(s) Oral daily  aspirin enteric coated 81 milliGRAM(s) Oral daily  atorvastatin 80 milliGRAM(s) Oral at bedtime  cefuroxime  IVPB 1500 milliGRAM(s) IV Intermittent once  chlorhexidine 0.12% Liquid 30 milliLiter(s) Swish and Spit once  chlorhexidine 4% Liquid 1 Application(s) Topical once  dextrose 40% Gel 15 Gram(s) Oral once  dextrose 50% Injectable 25 Gram(s) IV Push once  dextrose 50% Injectable 12.5 Gram(s) IV Push once  dextrose 50% Injectable 25 Gram(s) IV Push once  glucagon  Injectable 1 milliGRAM(s) IntraMuscular once  heparin   Injectable 5000 Unit(s) SubCutaneous every 8 hours  influenza   Vaccine 0.5 milliLiter(s) IntraMuscular once  insulin lispro (ADMELOG) corrective regimen sliding scale   SubCutaneous Before meals and at bedtime  metoprolol tartrate 25 milliGRAM(s) Oral two times a day  sodium chloride 0.9% lock flush 3 milliLiter(s) IV Push every 8 hours  tamsulosin 0.4 milliGRAM(s) Oral at bedtime      Assessment:  -angina s/p lhc c/w mvd EF 55%; LM Distal 50%, LAD Prox 50%Lcx Ostial 80%, OM closed 100%, RCA Closed 100%. P  -DM  -HTN    Recs:  awaiting cabg with dr cayden castillo  cw asa statin and beta blockers  carotids wnl  tele monitoring        Over 25 minutes spent on total encounter; more than 50% of the visit was spent counseling and/or coordinating care by the attending physician.      Singh Hill MD   Cardiovascular Disease  [phone]

## 2020-12-16 NOTE — PROGRESS NOTE ADULT - SUBJECTIVE AND OBJECTIVE BOX
Cardiac Surgery Pre-op Note:     Surgeon: Dr. Hairston    Procedure: 2020 CABGx3    HPI:  74 yo Male poor historian with PMHx of HTN, HLD, DM2, BPH and OA who presented to [facility] today for an elective cardiac cath.  Patient was seen and evaluated by Cardiologist Dr. Horvath for routine visit and patient was fount to have symptoms of CP with WINN and SOB x 5 months. Cardiac cath finding revealed significant multivessel CAD.  Preliminary cath finding:  EF 55%; LM Distal 50%, LAD Prox 50%Lcx Ostial 80%, OM closed 100%, RCA Closed 100%. Patient transferred to [facility] for cardiac surgery evaluation with Dr. Hairston. At this time patient reports asymptomatic denies CP, palpitation, SOB, WINN, HA, dizziness, N/V/D, fever or chills.  No acute event noted overnight.    (14 Dec 2020 17:39)      PAST MEDICAL & SURGICAL HISTORY:  Aneurysm of right leg    BPH (benign prostatic hyperplasia)    Osteoarthritis    Type 2 diabetes mellitus    HLD (hyperlipidemia)    HTN (hypertension)    CAD in native artery    BPH (benign prostatic hyperplasia)    OA (osteoarthritis)    DM (diabetes mellitus)    HLD (hyperlipidemia)    HTN (hypertension)    S/P appendectomy    H/O left inguinal hernia repair    S/P excision of ganglion cyst    S/P left inguinal hernia repair    S/P appendectomy        MEDICATIONS  (STANDING):  amLODIPine   Tablet 10 milliGRAM(s) Oral daily  aspirin enteric coated 81 milliGRAM(s) Oral daily  atorvastatin 80 milliGRAM(s) Oral at bedtime  cefuroxime  IVPB 1500 milliGRAM(s) IV Intermittent once  chlorhexidine 0.12% Liquid 30 milliLiter(s) Swish and Spit once  chlorhexidine 4% Liquid 1 Application(s) Topical once  dextrose 40% Gel 15 Gram(s) Oral once  dextrose 50% Injectable 25 Gram(s) IV Push once  dextrose 50% Injectable 12.5 Gram(s) IV Push once  dextrose 50% Injectable 25 Gram(s) IV Push once  glucagon  Injectable 1 milliGRAM(s) IntraMuscular once  heparin   Injectable 5000 Unit(s) SubCutaneous every 8 hours  influenza   Vaccine 0.5 milliLiter(s) IntraMuscular once  insulin lispro (ADMELOG) corrective regimen sliding scale   SubCutaneous Before meals and at bedtime  metoprolol tartrate 25 milliGRAM(s) Oral two times a day  sodium chloride 0.9% lock flush 3 milliLiter(s) IV Push every 8 hours  tamsulosin 0.4 milliGRAM(s) Oral at bedtime      Vital Signs Last 24 Hrs  T(C): 36.4 (20 @ 05:01), Max: 36.4 (12-15-20 @ 11:56)  T(F): 97.5 (20 @ 05:01), Max: 97.5 (12-15-20 @ 11:56)  HR: 75 (20 @ 05:01) (70 - 90)  BP: 136/70 (20 @ 05:01) (134/71 - 165/83)  RR: 18 (20 @ 05:01) (18 - 18)  SpO2: 97% (20 @ 05:01) (94% - 97%)              Height (cm): 172.7      Weight (kg): 83.6     BMI (kg/m2): 28 (14 Dec 2020 17:37)      Labs:                        13.8   4.84  )-----------( 144      ( 16 Dec 2020 05:34 )             40.8     139  |  107  |  26<H>  ----------------------------<  123<H>  4.0   |  21<L>  |  1.19    AST  19  /  ALT  15  /  AlkPhos  89  12-15    PT/INR/PTT - ( 14 Dec 2020 18:56 )   PT: 12.2 sec;   INR: 1.02 ratio  PTT:29.3 sec    ABO Interpretation: O (12-15 @ 06:12)    Serum Pro-Brain Natriuretic Peptide: 124 pg/mL ( @ 18:56)    Thyroid Panel: 12-15 @ 02:14     0.68/6.6/99    MRSA/ MSSA PCR Result.: NotDetec (12-15 @ 02:31)    Urinalysis Basic - ( 14 Dec 2020 18:44 )  Color: Light Yellow / Appearance: Clear / S.034 / pH: x  Gluc: x / Ketone: Negative  / Bili: Negative / Urobili: Negative   Blood: x / Protein: Trace / Nitrite: Negative   Leuk Esterase: Negative / RBC: x / WBC x   Sq Epi: x / Non Sq Epi: x / Bacteria: x      P2Y12: 198      < from: Xray Chest 2 Views PA/Lat (20 @ 19:52) >  IMPRESSION:  1.  The lungs are clear.      < from: VA Duplex Carotid, Bilat (12.15.20 @ 10:03) >  IMPRESSION: No significant hemodynamic stenosis of either carotid artery.      PFT's: completed 12/15      < from: TTE with Doppler (w/Cont) (12.15.20 @ 11:33) >  Observations:  Mitral Valve: Normal mitral valve. Mild mitral  regurgitation.  Aortic Valve/Aorta: Mildly calcified trileaflet aortic  valve with normal opening. Minimal aortic regurgitation.  Upper limits of normal aortic root size for BSA.  (Ao: 4.1  cm at the sinuses ofValsalva).  Left Atrium: Mildly dilated left atrium.  LA volume index =  35 cc/m2. Atrial septal aneurysm seen.  Left Ventricle: Endocardial visualization enhanced with  intravenous injection of Ultrasonic Enhancing Agent  (Definity). Normal left ventricular systolic function. No  segmental wall motion abnormalities. Normal left  ventricular internal dimensions and wall thicknesses. Mild  diastolic dysfunction (Stage I).  Right Heart: Normal right atrium. Normal right ventricular  size and function. Normal tricuspid valve. Minimal  tricuspid regurgitation. Normal pulmonic valve. Minimal  pulmonic regurgitation.  Pericardium/Pleura: Normal pericardium with no pericardial  effusion.  Hemodynamic: Inadequate tricuspid regurgitation Doppler  envelope precludes estimation of RVSP.      < from: Cardiac Cath Lab - Adult (20 @ 12:00) >  CORONARY VESSELS: The coronary circulation is right dominant.  LM:   --  Distal left main: There was a 50 % stenosis. The lesion was  eccentric.  LAD:   --  Proximal LAD: There was a 50 % stenosis.  --  Mid LAD: There was a 60 % stenosis.  --  D1: There was a 80 % stenosis at the ostium of the vessel segment.  CX:   --  Ostial circumflex: There was a 60 % stenosis.  --  Distal circumflex: There was a 100 % stenosis.  --  OM1: There was a100 % stenosis in the proximal third of the vessel  segment. Distal vessel angiography showed mild diffuse disease and is  filled via left to left collaterals.  RI:   --  Ramus intermedius: Angiography showed mild atherosclerosis with  no flow limiting lesions.  RCA:   --  Mid RCA: There was a 100 % stenosis. Distal vessel/RPL/RPDA  filled via left to right collaterals.  COMPLICATIONS: There were no complications.        PHYSICAL EXAM:  Neuro: A&Ox3, NAD  Pulm: B/L BS CTA  CV: RRR,+S1S2  Abd: Soft, NT/ND +BSX4Q  Ext: B/L LE no edema, +PP, no calf tenderness      Pt has AICD/PPM [ ] Yes  [x ] No             Pre-op Beta Blocker ordered within 24 hrs of surgery (CABG ONLY)?  [x ] Yes  [ ] No  If not, Why?  Type & Cross  [X] Yes  [ ] No  NPO after Midnight [x ] Yes  [ ] No  Pre-op ABX ordered, to be taped on chart:  [ x] Yes  [ ] No     Hibiclens/Peridex ordered [x ] Yes  [ ] No  Intraop on Hold: PRBCs, CXR, SHREE [x ]   Consent obtained  [x ] Yes  [ ] No

## 2020-12-17 ENCOUNTER — APPOINTMENT (OUTPATIENT)
Dept: CARDIOTHORACIC SURGERY | Facility: HOSPITAL | Age: 75
End: 2020-12-17

## 2020-12-17 PROBLEM — N40.0 BENIGN PROSTATIC HYPERPLASIA WITHOUT LOWER URINARY TRACT SYMPTOMS: Chronic | Status: ACTIVE | Noted: 2020-12-14

## 2020-12-17 PROBLEM — M19.90 UNSPECIFIED OSTEOARTHRITIS, UNSPECIFIED SITE: Chronic | Status: ACTIVE | Noted: 2020-12-14

## 2020-12-17 PROBLEM — E11.9 TYPE 2 DIABETES MELLITUS WITHOUT COMPLICATIONS: Chronic | Status: ACTIVE | Noted: 2020-12-14

## 2020-12-17 PROBLEM — I10 ESSENTIAL (PRIMARY) HYPERTENSION: Chronic | Status: ACTIVE | Noted: 2020-12-14

## 2020-12-17 PROBLEM — I72.4 ANEURYSM OF ARTERY OF LOWER EXTREMITY: Chronic | Status: ACTIVE | Noted: 2020-12-14

## 2020-12-17 PROBLEM — E78.5 HYPERLIPIDEMIA, UNSPECIFIED: Chronic | Status: ACTIVE | Noted: 2020-12-14

## 2020-12-17 PROBLEM — I25.10 ATHEROSCLEROTIC HEART DISEASE OF NATIVE CORONARY ARTERY WITHOUT ANGINA PECTORIS: Chronic | Status: ACTIVE | Noted: 2020-12-14

## 2020-12-17 LAB
ALBUMIN SERPL ELPH-MCNC: 3.7 G/DL — SIGNIFICANT CHANGE UP (ref 3.3–5)
ALP SERPL-CCNC: 59 U/L — SIGNIFICANT CHANGE UP (ref 40–120)
ALT FLD-CCNC: 13 U/L — SIGNIFICANT CHANGE UP (ref 10–45)
ANION GAP SERPL CALC-SCNC: 14 MMOL/L — SIGNIFICANT CHANGE UP (ref 5–17)
APTT BLD: 25.8 SEC — LOW (ref 27.5–35.5)
AST SERPL-CCNC: 25 U/L — SIGNIFICANT CHANGE UP (ref 10–40)
BASE EXCESS BLDV CALC-SCNC: -0.7 MMOL/L — SIGNIFICANT CHANGE UP (ref -2–2)
BASE EXCESS BLDV CALC-SCNC: -3.1 MMOL/L — LOW (ref -2–2)
BASE EXCESS BLDV CALC-SCNC: -3.8 MMOL/L — LOW (ref -2–2)
BASE EXCESS BLDV CALC-SCNC: -4.3 MMOL/L — LOW (ref -2–2)
BASE EXCESS BLDV CALC-SCNC: -4.6 MMOL/L — LOW (ref -2–2)
BASE EXCESS BLDV CALC-SCNC: 1.7 MMOL/L — SIGNIFICANT CHANGE UP (ref -2–2)
BASOPHILS # BLD AUTO: 0.03 K/UL — SIGNIFICANT CHANGE UP (ref 0–0.2)
BASOPHILS NFR BLD AUTO: 0.3 % — SIGNIFICANT CHANGE UP (ref 0–2)
BILIRUB SERPL-MCNC: 0.9 MG/DL — SIGNIFICANT CHANGE UP (ref 0.2–1.2)
BLOOD GAS VENOUS - CREATININE: SIGNIFICANT CHANGE UP MG/DL (ref 0.5–1.3)
BLOOD GAS VENOUS - CREATININE: SIGNIFICANT CHANGE UP MG/DL (ref 0.5–1.3)
BUN SERPL-MCNC: 22 MG/DL — SIGNIFICANT CHANGE UP (ref 7–23)
CA-I SERPL-SCNC: 1.03 MMOL/L — LOW (ref 1.12–1.3)
CA-I SERPL-SCNC: 1.05 MMOL/L — LOW (ref 1.12–1.3)
CA-I SERPL-SCNC: 1.1 MMOL/L — LOW (ref 1.12–1.3)
CALCIUM SERPL-MCNC: 9.9 MG/DL — SIGNIFICANT CHANGE UP (ref 8.4–10.5)
CHLORIDE BLDV-SCNC: SIGNIFICANT CHANGE UP MMOL/L (ref 96–108)
CHLORIDE SERPL-SCNC: 107 MMOL/L — SIGNIFICANT CHANGE UP (ref 96–108)
CK MB BLD-MCNC: 7.9 % — HIGH (ref 0–3.5)
CK MB CFR SERPL CALC: 21.2 NG/ML — HIGH (ref 0–6.7)
CK SERPL-CCNC: 270 U/L — HIGH (ref 30–200)
CO2 BLDV-SCNC: 23 MMOL/L — SIGNIFICANT CHANGE UP (ref 22–30)
CO2 BLDV-SCNC: 23 MMOL/L — SIGNIFICANT CHANGE UP (ref 22–30)
CO2 BLDV-SCNC: 25 MMOL/L — SIGNIFICANT CHANGE UP (ref 22–30)
CO2 SERPL-SCNC: 21 MMOL/L — LOW (ref 22–31)
CREAT SERPL-MCNC: 1.03 MG/DL — SIGNIFICANT CHANGE UP (ref 0.5–1.3)
EOSINOPHIL # BLD AUTO: 0.04 K/UL — SIGNIFICANT CHANGE UP (ref 0–0.5)
EOSINOPHIL NFR BLD AUTO: 0.3 % — SIGNIFICANT CHANGE UP (ref 0–6)
FIBRINOGEN PPP-MCNC: 287 MG/DL — LOW (ref 290–520)
GAS PNL BLDA: SIGNIFICANT CHANGE UP
GAS PNL BLDV: 138 MMOL/L — SIGNIFICANT CHANGE UP (ref 135–145)
GAS PNL BLDV: 139 MMOL/L — SIGNIFICANT CHANGE UP (ref 135–145)
GAS PNL BLDV: 141 MMOL/L — SIGNIFICANT CHANGE UP (ref 135–145)
GAS PNL BLDV: SIGNIFICANT CHANGE UP
GLUCOSE BLDC GLUCOMTR-MCNC: 105 MG/DL — HIGH (ref 70–99)
GLUCOSE BLDC GLUCOMTR-MCNC: 112 MG/DL — HIGH (ref 70–99)
GLUCOSE BLDC GLUCOMTR-MCNC: 151 MG/DL — HIGH (ref 70–99)
GLUCOSE BLDV-MCNC: 214 MG/DL — HIGH (ref 70–99)
GLUCOSE BLDV-MCNC: 249 MG/DL — HIGH (ref 70–99)
GLUCOSE BLDV-MCNC: 252 MG/DL — HIGH (ref 70–99)
GLUCOSE SERPL-MCNC: 173 MG/DL — HIGH (ref 70–99)
HCO3 BLDV-SCNC: 21 MMOL/L — SIGNIFICANT CHANGE UP (ref 21–29)
HCO3 BLDV-SCNC: 22 MMOL/L — SIGNIFICANT CHANGE UP (ref 21–29)
HCO3 BLDV-SCNC: 22 MMOL/L — SIGNIFICANT CHANGE UP (ref 21–29)
HCO3 BLDV-SCNC: 23 MMOL/L — SIGNIFICANT CHANGE UP (ref 21–29)
HCO3 BLDV-SCNC: 25 MMOL/L — SIGNIFICANT CHANGE UP (ref 21–29)
HCO3 BLDV-SCNC: 26 MMOL/L — SIGNIFICANT CHANGE UP (ref 21–29)
HCT VFR BLD CALC: 34.8 % — LOW (ref 39–50)
HCT VFR BLDA CALC: 29 % — LOW (ref 39–50)
HCT VFR BLDA CALC: 31 % — LOW (ref 39–50)
HCT VFR BLDA CALC: 32 % — LOW (ref 39–50)
HGB BLD CALC-MCNC: 10 G/DL — LOW (ref 13–17)
HGB BLD CALC-MCNC: 10.3 G/DL — LOW (ref 13–17)
HGB BLD CALC-MCNC: 9.4 G/DL — LOW (ref 13–17)
HGB BLD-MCNC: 11.5 G/DL — LOW (ref 13–17)
HOROWITZ INDEX BLDV+IHG-RTO: 0 — SIGNIFICANT CHANGE UP
HOROWITZ INDEX BLDV+IHG-RTO: 50 — SIGNIFICANT CHANGE UP
HOROWITZ INDEX BLDV+IHG-RTO: 60 — SIGNIFICANT CHANGE UP
HOROWITZ INDEX BLDV+IHG-RTO: 60 — SIGNIFICANT CHANGE UP
IMM GRANULOCYTES NFR BLD AUTO: 1 % — SIGNIFICANT CHANGE UP (ref 0–1.5)
INR BLD: 1.26 RATIO — HIGH (ref 0.88–1.16)
LACTATE BLDV-MCNC: 1.5 MMOL/L — SIGNIFICANT CHANGE UP (ref 0.7–2)
LACTATE BLDV-MCNC: 2.4 MMOL/L — HIGH (ref 0.7–2)
LACTATE BLDV-MCNC: 2.4 MMOL/L — HIGH (ref 0.7–2)
LYMPHOCYTES # BLD AUTO: 1.58 K/UL — SIGNIFICANT CHANGE UP (ref 1–3.3)
LYMPHOCYTES # BLD AUTO: 13.6 % — SIGNIFICANT CHANGE UP (ref 13–44)
MANUAL SMEAR VERIFICATION: SIGNIFICANT CHANGE UP
MCHC RBC-ENTMCNC: 29.6 PG — SIGNIFICANT CHANGE UP (ref 27–34)
MCHC RBC-ENTMCNC: 33 GM/DL — SIGNIFICANT CHANGE UP (ref 32–36)
MCV RBC AUTO: 89.7 FL — SIGNIFICANT CHANGE UP (ref 80–100)
MONOCYTES # BLD AUTO: 0.75 K/UL — SIGNIFICANT CHANGE UP (ref 0–0.9)
MONOCYTES NFR BLD AUTO: 6.5 % — SIGNIFICANT CHANGE UP (ref 2–14)
NEUTROPHILS # BLD AUTO: 9.07 K/UL — HIGH (ref 1.8–7.4)
NEUTROPHILS NFR BLD AUTO: 78.3 % — HIGH (ref 43–77)
NRBC # BLD: 0 /100 WBCS — SIGNIFICANT CHANGE UP (ref 0–0)
PCO2 BLDV: 41 MMHG — SIGNIFICANT CHANGE UP (ref 35–50)
PCO2 BLDV: 45 MMHG — SIGNIFICANT CHANGE UP (ref 35–50)
PCO2 BLDV: 47 MMHG — SIGNIFICANT CHANGE UP (ref 35–50)
PCO2 BLDV: 48 MMHG — SIGNIFICANT CHANGE UP (ref 35–50)
PCO2 BLDV: 49 MMHG — SIGNIFICANT CHANGE UP (ref 35–50)
PCO2 BLDV: 52 MMHG — HIGH (ref 35–50)
PH BLDV: 7.28 — LOW (ref 7.35–7.45)
PH BLDV: 7.29 — LOW (ref 7.35–7.45)
PH BLDV: 7.29 — LOW (ref 7.35–7.45)
PH BLDV: 7.31 — LOW (ref 7.35–7.45)
PH BLDV: 7.34 — LOW (ref 7.35–7.45)
PH BLDV: 7.39 — SIGNIFICANT CHANGE UP (ref 7.35–7.45)
PLAT MORPH BLD: NORMAL — SIGNIFICANT CHANGE UP
PLATELET # BLD AUTO: 117 K/UL — LOW (ref 150–400)
PO2 BLDV: 50 MMHG — HIGH (ref 25–45)
PO2 BLDV: 51 MMHG — HIGH (ref 25–45)
PO2 BLDV: 52 MMHG — HIGH (ref 25–45)
PO2 BLDV: 59 MMHG — HIGH (ref 25–45)
PO2 BLDV: 60 MMHG — HIGH (ref 25–45)
PO2 BLDV: 70 MMHG — HIGH (ref 25–45)
POTASSIUM BLDV-SCNC: 3.9 MMOL/L — SIGNIFICANT CHANGE UP (ref 3.5–5)
POTASSIUM BLDV-SCNC: 4.1 MMOL/L — SIGNIFICANT CHANGE UP (ref 3.5–5)
POTASSIUM BLDV-SCNC: 4.2 MMOL/L — SIGNIFICANT CHANGE UP (ref 3.5–5)
POTASSIUM SERPL-MCNC: 3.7 MMOL/L — SIGNIFICANT CHANGE UP (ref 3.5–5.3)
POTASSIUM SERPL-SCNC: 3.7 MMOL/L — SIGNIFICANT CHANGE UP (ref 3.5–5.3)
PROT SERPL-MCNC: 5.3 G/DL — LOW (ref 6–8.3)
PROTHROM AB SERPL-ACNC: 15 SEC — HIGH (ref 10.6–13.6)
RBC # BLD: 3.88 M/UL — LOW (ref 4.2–5.8)
RBC # FLD: 12.4 % — SIGNIFICANT CHANGE UP (ref 10.3–14.5)
RBC BLD AUTO: SIGNIFICANT CHANGE UP
SAO2 % BLDV: 77 % — SIGNIFICANT CHANGE UP (ref 67–88)
SAO2 % BLDV: 78 % — SIGNIFICANT CHANGE UP (ref 67–88)
SAO2 % BLDV: 80 % — SIGNIFICANT CHANGE UP (ref 67–88)
SAO2 % BLDV: 87 % — SIGNIFICANT CHANGE UP (ref 67–88)
SAO2 % BLDV: 90 % — HIGH (ref 67–88)
SAO2 % BLDV: 94 % — HIGH (ref 67–88)
SODIUM SERPL-SCNC: 142 MMOL/L — SIGNIFICANT CHANGE UP (ref 135–145)
TROPONIN T, HIGH SENSITIVITY RESULT: 200 NG/L — HIGH (ref 0–51)
WBC # BLD: 11.59 K/UL — HIGH (ref 3.8–10.5)
WBC # FLD AUTO: 11.59 K/UL — HIGH (ref 3.8–10.5)

## 2020-12-17 PROCEDURE — 33508 ENDOSCOPIC VEIN HARVEST: CPT

## 2020-12-17 PROCEDURE — 93010 ELECTROCARDIOGRAM REPORT: CPT

## 2020-12-17 PROCEDURE — 99291 CRITICAL CARE FIRST HOUR: CPT

## 2020-12-17 PROCEDURE — 33533 CABG ARTERIAL SINGLE: CPT

## 2020-12-17 PROCEDURE — 71045 X-RAY EXAM CHEST 1 VIEW: CPT | Mod: 26

## 2020-12-17 PROCEDURE — 33519 CABG ARTERY-VEIN THREE: CPT

## 2020-12-17 RX ORDER — POTASSIUM CHLORIDE 20 MEQ
10 PACKET (EA) ORAL
Refills: 0 | Status: DISCONTINUED | OUTPATIENT
Start: 2020-12-17 | End: 2020-12-17

## 2020-12-17 RX ORDER — DEXTROSE 50 % IN WATER 50 %
25 SYRINGE (ML) INTRAVENOUS
Refills: 0 | Status: DISCONTINUED | OUTPATIENT
Start: 2020-12-17 | End: 2020-12-17

## 2020-12-17 RX ORDER — HYDROMORPHONE HYDROCHLORIDE 2 MG/ML
0.5 INJECTION INTRAMUSCULAR; INTRAVENOUS; SUBCUTANEOUS ONCE
Refills: 0 | Status: DISCONTINUED | OUTPATIENT
Start: 2020-12-17 | End: 2020-12-17

## 2020-12-17 RX ORDER — POTASSIUM CHLORIDE 20 MEQ
10 PACKET (EA) ORAL
Refills: 0 | Status: COMPLETED | OUTPATIENT
Start: 2020-12-17 | End: 2020-12-17

## 2020-12-17 RX ORDER — SODIUM CHLORIDE 9 MG/ML
1000 INJECTION, SOLUTION INTRAVENOUS
Refills: 0 | Status: DISCONTINUED | OUTPATIENT
Start: 2020-12-17 | End: 2020-12-17

## 2020-12-17 RX ORDER — METOCLOPRAMIDE HCL 10 MG
10 TABLET ORAL EVERY 8 HOURS
Refills: 0 | Status: DISCONTINUED | OUTPATIENT
Start: 2020-12-17 | End: 2020-12-17

## 2020-12-17 RX ORDER — ATORVASTATIN CALCIUM 80 MG/1
80 TABLET, FILM COATED ORAL AT BEDTIME
Refills: 0 | Status: DISCONTINUED | OUTPATIENT
Start: 2020-12-17 | End: 2020-12-20

## 2020-12-17 RX ORDER — ALBUMIN HUMAN 25 %
250 VIAL (ML) INTRAVENOUS
Refills: 0 | Status: COMPLETED | OUTPATIENT
Start: 2020-12-17 | End: 2020-12-17

## 2020-12-17 RX ORDER — SODIUM CHLORIDE 9 MG/ML
500 INJECTION, SOLUTION INTRAVENOUS ONCE
Refills: 0 | Status: COMPLETED | OUTPATIENT
Start: 2020-12-17 | End: 2020-12-17

## 2020-12-17 RX ORDER — ACETAMINOPHEN 500 MG
1000 TABLET ORAL ONCE
Refills: 0 | Status: COMPLETED | OUTPATIENT
Start: 2020-12-17 | End: 2020-12-17

## 2020-12-17 RX ORDER — MEPERIDINE HYDROCHLORIDE 50 MG/ML
25 INJECTION INTRAMUSCULAR; INTRAVENOUS; SUBCUTANEOUS ONCE
Refills: 0 | Status: DISCONTINUED | OUTPATIENT
Start: 2020-12-17 | End: 2020-12-17

## 2020-12-17 RX ORDER — CEFUROXIME AXETIL 250 MG
1500 TABLET ORAL EVERY 8 HOURS
Refills: 0 | Status: COMPLETED | OUTPATIENT
Start: 2020-12-17 | End: 2020-12-19

## 2020-12-17 RX ORDER — ASPIRIN/CALCIUM CARB/MAGNESIUM 324 MG
300 TABLET ORAL ONCE
Refills: 0 | Status: DISCONTINUED | OUTPATIENT
Start: 2020-12-17 | End: 2020-12-17

## 2020-12-17 RX ORDER — OXYCODONE AND ACETAMINOPHEN 5; 325 MG/1; MG/1
1 TABLET ORAL EVERY 6 HOURS
Refills: 0 | Status: DISCONTINUED | OUTPATIENT
Start: 2020-12-17 | End: 2020-12-17

## 2020-12-17 RX ORDER — PANTOPRAZOLE SODIUM 20 MG/1
40 TABLET, DELAYED RELEASE ORAL DAILY
Refills: 0 | Status: DISCONTINUED | OUTPATIENT
Start: 2020-12-17 | End: 2020-12-18

## 2020-12-17 RX ORDER — DEXTROSE 50 % IN WATER 50 %
50 SYRINGE (ML) INTRAVENOUS
Refills: 0 | Status: DISCONTINUED | OUTPATIENT
Start: 2020-12-17 | End: 2020-12-17

## 2020-12-17 RX ORDER — SODIUM CHLORIDE 9 MG/ML
1000 INJECTION INTRAMUSCULAR; INTRAVENOUS; SUBCUTANEOUS
Refills: 0 | Status: DISCONTINUED | OUTPATIENT
Start: 2020-12-17 | End: 2020-12-21

## 2020-12-17 RX ORDER — FENTANYL CITRATE 50 UG/ML
50 INJECTION INTRAVENOUS ONCE
Refills: 0 | Status: DISCONTINUED | OUTPATIENT
Start: 2020-12-17 | End: 2020-12-17

## 2020-12-17 RX ORDER — INSULIN HUMAN 100 [IU]/ML
4 INJECTION, SOLUTION SUBCUTANEOUS
Qty: 100 | Refills: 0 | Status: DISCONTINUED | OUTPATIENT
Start: 2020-12-17 | End: 2020-12-20

## 2020-12-17 RX ORDER — METOPROLOL TARTRATE 50 MG
12.5 TABLET ORAL
Refills: 0 | Status: DISCONTINUED | OUTPATIENT
Start: 2020-12-17 | End: 2020-12-18

## 2020-12-17 RX ORDER — NOREPINEPHRINE BITARTRATE/D5W 8 MG/250ML
0.05 PLASTIC BAG, INJECTION (ML) INTRAVENOUS
Qty: 8 | Refills: 0 | Status: DISCONTINUED | OUTPATIENT
Start: 2020-12-17 | End: 2020-12-17

## 2020-12-17 RX ORDER — DEXMEDETOMIDINE HYDROCHLORIDE IN 0.9% SODIUM CHLORIDE 4 UG/ML
0.2 INJECTION INTRAVENOUS
Qty: 200 | Refills: 0 | Status: DISCONTINUED | OUTPATIENT
Start: 2020-12-17 | End: 2020-12-17

## 2020-12-17 RX ORDER — SODIUM CHLORIDE 9 MG/ML
750 INJECTION, SOLUTION INTRAVENOUS ONCE
Refills: 0 | Status: COMPLETED | OUTPATIENT
Start: 2020-12-17 | End: 2020-12-17

## 2020-12-17 RX ORDER — OXYCODONE AND ACETAMINOPHEN 5; 325 MG/1; MG/1
2 TABLET ORAL EVERY 6 HOURS
Refills: 0 | Status: DISCONTINUED | OUTPATIENT
Start: 2020-12-17 | End: 2020-12-17

## 2020-12-17 RX ORDER — MAGNESIUM SULFATE 500 MG/ML
2 VIAL (ML) INJECTION ONCE
Refills: 0 | Status: COMPLETED | OUTPATIENT
Start: 2020-12-17 | End: 2020-12-17

## 2020-12-17 RX ORDER — INSULIN HUMAN 100 [IU]/ML
3 INJECTION, SOLUTION SUBCUTANEOUS
Qty: 100 | Refills: 0 | Status: DISCONTINUED | OUTPATIENT
Start: 2020-12-17 | End: 2020-12-17

## 2020-12-17 RX ORDER — CHLORHEXIDINE GLUCONATE 213 G/1000ML
5 SOLUTION TOPICAL
Refills: 0 | Status: DISCONTINUED | OUTPATIENT
Start: 2020-12-17 | End: 2020-12-17

## 2020-12-17 RX ORDER — ASPIRIN/CALCIUM CARB/MAGNESIUM 324 MG
325 TABLET ORAL DAILY
Refills: 0 | Status: DISCONTINUED | OUTPATIENT
Start: 2020-12-17 | End: 2020-12-22

## 2020-12-17 RX ORDER — METOCLOPRAMIDE HCL 10 MG
10 TABLET ORAL EVERY 8 HOURS
Refills: 0 | Status: COMPLETED | OUTPATIENT
Start: 2020-12-17 | End: 2020-12-19

## 2020-12-17 RX ORDER — CHLORHEXIDINE GLUCONATE 213 G/1000ML
1 SOLUTION TOPICAL
Refills: 0 | Status: DISCONTINUED | OUTPATIENT
Start: 2020-12-17 | End: 2020-12-21

## 2020-12-17 RX ORDER — SODIUM CHLORIDE 9 MG/ML
1000 INJECTION, SOLUTION INTRAVENOUS ONCE
Refills: 0 | Status: COMPLETED | OUTPATIENT
Start: 2020-12-17 | End: 2020-12-17

## 2020-12-17 RX ORDER — POLYETHYLENE GLYCOL 3350 17 G/17G
17 POWDER, FOR SOLUTION ORAL DAILY
Refills: 0 | Status: DISCONTINUED | OUTPATIENT
Start: 2020-12-17 | End: 2020-12-22

## 2020-12-17 RX ADMIN — DEXMEDETOMIDINE HYDROCHLORIDE IN 0.9% SODIUM CHLORIDE 4.18 MICROGRAM(S)/KG/HR: 4 INJECTION INTRAVENOUS at 19:45

## 2020-12-17 RX ADMIN — Medication 325 MILLIGRAM(S): at 22:05

## 2020-12-17 RX ADMIN — Medication 10 MILLIGRAM(S): at 15:00

## 2020-12-17 RX ADMIN — Medication 125 MILLILITER(S): at 15:08

## 2020-12-17 RX ADMIN — Medication 100 MILLIGRAM(S): at 23:34

## 2020-12-17 RX ADMIN — HEPARIN SODIUM 5000 UNIT(S): 5000 INJECTION INTRAVENOUS; SUBCUTANEOUS at 05:14

## 2020-12-17 RX ADMIN — Medication 400 MILLIGRAM(S): at 19:45

## 2020-12-17 RX ADMIN — Medication 25 MILLIGRAM(S): at 05:14

## 2020-12-17 RX ADMIN — Medication 100 MILLIEQUIVALENT(S): at 13:51

## 2020-12-17 RX ADMIN — Medication 100 MILLIEQUIVALENT(S): at 14:07

## 2020-12-17 RX ADMIN — CHLORHEXIDINE GLUCONATE 5 MILLILITER(S): 213 SOLUTION TOPICAL at 18:17

## 2020-12-17 RX ADMIN — INSULIN HUMAN 3 UNIT(S)/HR: 100 INJECTION, SOLUTION SUBCUTANEOUS at 16:10

## 2020-12-17 RX ADMIN — Medication 50 GRAM(S): at 16:30

## 2020-12-17 RX ADMIN — HYDROMORPHONE HYDROCHLORIDE 0.5 MILLIGRAM(S): 2 INJECTION INTRAMUSCULAR; INTRAVENOUS; SUBCUTANEOUS at 23:55

## 2020-12-17 RX ADMIN — Medication 125 MILLILITER(S): at 15:50

## 2020-12-17 RX ADMIN — FENTANYL CITRATE 50 MICROGRAM(S): 50 INJECTION INTRAVENOUS at 15:30

## 2020-12-17 RX ADMIN — Medication 1000 MILLIGRAM(S): at 20:00

## 2020-12-17 RX ADMIN — CHLORHEXIDINE GLUCONATE 1 APPLICATION(S): 213 SOLUTION TOPICAL at 07:15

## 2020-12-17 RX ADMIN — Medication 100 MILLIEQUIVALENT(S): at 17:20

## 2020-12-17 RX ADMIN — Medication 125 MILLILITER(S): at 14:07

## 2020-12-17 RX ADMIN — SODIUM CHLORIDE 3 MILLILITER(S): 9 INJECTION INTRAMUSCULAR; INTRAVENOUS; SUBCUTANEOUS at 05:12

## 2020-12-17 RX ADMIN — Medication 100 MILLIEQUIVALENT(S): at 16:15

## 2020-12-17 RX ADMIN — Medication 10 MILLIGRAM(S): at 22:07

## 2020-12-17 RX ADMIN — FENTANYL CITRATE 50 MICROGRAM(S): 50 INJECTION INTRAVENOUS at 15:50

## 2020-12-17 RX ADMIN — CHLORHEXIDINE GLUCONATE 30 MILLILITER(S): 213 SOLUTION TOPICAL at 05:14

## 2020-12-17 RX ADMIN — Medication 100 MILLIEQUIVALENT(S): at 18:27

## 2020-12-17 RX ADMIN — Medication 12.5 MILLIGRAM(S): at 23:34

## 2020-12-17 RX ADMIN — SODIUM CHLORIDE 1000 MILLILITER(S): 9 INJECTION, SOLUTION INTRAVENOUS at 16:27

## 2020-12-17 RX ADMIN — FENTANYL CITRATE 50 MICROGRAM(S): 50 INJECTION INTRAVENOUS at 18:45

## 2020-12-17 RX ADMIN — SODIUM CHLORIDE 500 MILLILITER(S): 9 INJECTION, SOLUTION INTRAVENOUS at 17:10

## 2020-12-17 RX ADMIN — Medication 100 MILLIGRAM(S): at 16:37

## 2020-12-17 RX ADMIN — Medication 100 MILLIEQUIVALENT(S): at 14:55

## 2020-12-17 RX ADMIN — AMLODIPINE BESYLATE 10 MILLIGRAM(S): 2.5 TABLET ORAL at 05:14

## 2020-12-17 RX ADMIN — SODIUM CHLORIDE 1500 MILLILITER(S): 9 INJECTION, SOLUTION INTRAVENOUS at 18:40

## 2020-12-17 RX ADMIN — ATORVASTATIN CALCIUM 80 MILLIGRAM(S): 80 TABLET, FILM COATED ORAL at 23:56

## 2020-12-17 RX ADMIN — FENTANYL CITRATE 50 MICROGRAM(S): 50 INJECTION INTRAVENOUS at 19:00

## 2020-12-17 RX ADMIN — Medication 125 MILLILITER(S): at 13:52

## 2020-12-17 NOTE — PRE-OP CHECKLIST - SELECT TESTS ORDERED
BMP/CBC/CMP/PT/PTT/INR/Hepatic Function/Type and Cross/Type and Screen/Urinalysis/EKG/CXR/Results in MD note/POCT Blood Glucose/COVID

## 2020-12-17 NOTE — PRE-ANESTHESIA EVALUATION ADULT - NSANTHPMHFT_GEN_ALL_CORE
76 yo Male poor historian with PMHx of HTN, HLD, DM2, BPH and OA who presented to [facility] today for an elective cardiac cath.  Patient was seen and evaluated by Cardiologist Dr. Horvath for routine visit and patient was fount to have symptoms of CP with WINN and SOB x 5 months. Cardiac cath finding revealed significant multivessel CAD.  Preliminary cath finding:  EF 55%; LM Distal 50%, LAD Prox 50%Lcx Ostial 80%, OM closed 100%, RCA Closed 100%.

## 2020-12-17 NOTE — PROGRESS NOTE PEDS - SUBJECTIVE AND OBJECTIVE BOX
Cardiovascular Disease Progress Note    Overnight events: No acute events overnight.  awaiting cabg, no new cardiac sx  Otherwise review of systems negative    Objective Findings:  T(C): 36.8 (12-17-20 @ 07:31), Max: 36.8 (12-17-20 @ 05:11)  HR: 78 (12-17-20 @ 07:31) (69 - 82)  BP: 152/85 (12-17-20 @ 07:31) (110/65 - 156/88)  RR: 18 (12-17-20 @ 07:31) (18 - 18)  SpO2: 98% (12-17-20 @ 07:31) (97% - 99%)  Wt(kg): --  Daily Height in cm: 172.72 (17 Dec 2020 07:31)    Daily       Physical Exam:  Gen: NAD  HEENT: EOMI  CV: RRR, normal S1 + S2, no m/r/g  Lungs: CTAB  Abd: soft, non-tender  Ext: No edema    Telemetry: nsr    Laboratory Data:                        13.8   4.84  )-----------( 144      ( 16 Dec 2020 05:34 )             40.8     12-16    139  |  107  |  26<H>  ----------------------------<  123<H>  4.0   |  21<L>  |  1.19    Ca    9.3      16 Dec 2020 05:34                Inpatient Medications:  MEDICATIONS  (STANDING):  amLODIPine   Tablet 10 milliGRAM(s) Oral daily  aspirin enteric coated 81 milliGRAM(s) Oral daily  atorvastatin 80 milliGRAM(s) Oral at bedtime  cefuroxime  IVPB 1500 milliGRAM(s) IV Intermittent once  dextrose 40% Gel 15 Gram(s) Oral once  dextrose 50% Injectable 25 Gram(s) IV Push once  dextrose 50% Injectable 12.5 Gram(s) IV Push once  dextrose 50% Injectable 25 Gram(s) IV Push once  glucagon  Injectable 1 milliGRAM(s) IntraMuscular once  heparin   Injectable 5000 Unit(s) SubCutaneous every 8 hours  influenza   Vaccine 0.5 milliLiter(s) IntraMuscular once  insulin lispro (ADMELOG) corrective regimen sliding scale   SubCutaneous Before meals and at bedtime  metoprolol tartrate 25 milliGRAM(s) Oral two times a day  sodium chloride 0.9% lock flush 3 milliLiter(s) IV Push every 8 hours  tamsulosin 0.4 milliGRAM(s) Oral at bedtime      Assessment:  -angina s/p lhc c/w mvd EF 55%; LM Distal 50%, LAD Prox 50%Lcx Ostial 80%, OM closed 100%, RCA Closed 100%. P  -DM  -HTN    Recs:  awaiting cabg with dr rodarte today  cw asa statin and beta blockers  carotids wnl  tele monitoring  cw amlodipine for htn      Over 25 minutes spent on total encounter; more than 50% of the visit was spent counseling and/or coordinating care by the attending physician.      Singh Hill MD   Cardiovascular Disease  [phone]

## 2020-12-17 NOTE — PRE-ANESTHESIA EVALUATION ADULT - NSANTHADDINFOFT_GEN_ALL_CORE
Anesthetic plan, including risks and benefits, discussed with patient.  Patient seen and evaluated prior to entering the operating rom.

## 2020-12-17 NOTE — PROGRESS NOTE ADULT - SUBJECTIVE AND OBJECTIVE BOX
Patient seen and examined at the bedside.      Remained critically ill on continuous ICU monitoring.    OBJECTIVE:  ICU Vital Signs Last 24 Hrs  T(C): 36.9 (17 Dec 2020 19:00), Max: 36.9 (17 Dec 2020 19:00)  T(F): 98.4 (17 Dec 2020 19:00), Max: 98.4 (17 Dec 2020 19:00)  HR: 97 (17 Dec 2020 19:30) (69 - 106)  BP: 152/85 (17 Dec 2020 07:31) (127/67 - 152/85)  BP(mean): 107 (17 Dec 2020 07:31) (107 - 107)  ABP: 157/60 (17 Dec 2020 19:30) (115/55 - 157/60)  ABP(mean): 88 (17 Dec 2020 19:30) (71 - 88)  RR: 16 (17 Dec 2020 19:30) (12 - 37)  SpO2: 100% (17 Dec 2020 19:30) (97% - 100%)    Mode: AC/ CMV (Assist Control/ Continuous Mandatory Ventilation), RR (machine): 16, TV (machine): 650, FiO2: 50, PEEP: 5, ITime: 1, MAP: 10, PIP: 24    12-16 @ 07:01  -  12-17 @ 07:00  --------------------------------------------------------  IN: 760 mL / OUT: 1125 mL / NET: -365 mL    12-17 @ 07:01  -  12-17 @ 19:35  --------------------------------------------------------  IN: 2758.2 mL / OUT: 440 mL / NET: 2318.2 mL      CAPILLARY BLOOD GLUCOSE  117 (17 Dec 2020 19:00)      POCT Blood Glucose.: 151 mg/dL (17 Dec 2020 14:04)        Review of Systems:  Unable to obtain, pt is intubated and sedated.     PHYSICAL EXAM:  Daily Height in cm: 172.72 (17 Dec 2020 07:31)    General: NAD, sedated, intubated  Neurology: Sedated  Eyes: PERRLA  ENT/Neck: Neck supple, trachea midline, No JVD, Gross hearing intact  Respiratory: + ETT midline. No wheezing, rhonchi. Rales noted bilaterally.  CV: RRR, S1S2, no murmurs, rubs or gallops  Abdominal: Soft, NT, ND +BS,   Extremities: 1-2+ pedal edema noted, + peripheral pulses  Skin: No Rashes, Hematoma, Ecchymosis      LINES:  [x] Arterial Line   [x] Central Line  [ ] PA catheter  [ ] IABP  [ ] ECMO  [ ] LVAD  [x] Ventilator  [ ] pacemaker [ ] Impella    Beside evaluation, monitoring, treatment of hemodynamics, fluids, IVP/IVCD meds,         Ventilator (x)  Mode: AC/ CMV (Assist Control/ Continuous Mandatory Ventilation)  RR (machine): 16  TV (machine): 650  FiO2: 50  PEEP: 5  ITime: 1  MAP: 10  PIP: 24              Hemodynamic lability, instability. Requires IVCD [x] vasopressors [ ] inotropes  [ ] vasodilator  [x]IVSS fluid  to maintain MAP, perfusion, C.I.     Ventilator Management:  [x]AC-rest    [ ]CPAP-PS Wean    [ ]Trach Collar     [ ]Extubate    [ ] T-Piece  [ ]peep>5     ALL MEDICATIONS:  MEDICATIONS  (STANDING):  aspirin enteric coated 325 milliGRAM(s) Oral daily  aspirin Suppository 300 milliGRAM(s) Rectal once  cefuroxime  IVPB 1500 milliGRAM(s) IV Intermittent every 8 hours  chlorhexidine 0.12% Liquid 5 milliLiter(s) Oral Mucosa two times a day  chlorhexidine 2% Cloths 1 Application(s) Topical <User Schedule>  dextrose 5%. 1000 milliLiter(s) (15 mL/Hr) IV Continuous <Continuous>  dextrose 50% Injectable 50 milliLiter(s) IV Push every 15 minutes  dextrose 50% Injectable 25 milliLiter(s) IV Push every 15 minutes  insulin regular Infusion 3 Unit(s)/Hr (3 mL/Hr) IV Continuous <Continuous>  insulin regular Infusion 4 Unit(s)/Hr (4 mL/Hr) IV Continuous <Continuous>  meperidine     Injectable 25 milliGRAM(s) IV Push once  metoclopramide Injectable 10 milliGRAM(s) IV Push every 8 hours  norepinephrine Infusion 0.05 MICROgram(s)/kG/Min (7.84 mL/Hr) IV Continuous <Continuous>  pantoprazole  Injectable 40 milliGRAM(s) IV Push daily  polyethylene glycol 3350 17 Gram(s) Oral daily  potassium chloride  10 mEq/50 mL IVPB 10 milliEquivalent(s) IV Intermittent every 1 hour  potassium chloride  10 mEq/50 mL IVPB 10 milliEquivalent(s) IV Intermittent every 1 hour  potassium chloride  10 mEq/50 mL IVPB 10 milliEquivalent(s) IV Intermittent every 1 hour  sodium chloride 0.9%. 1000 milliLiter(s) (10 mL/Hr) IV Continuous <Continuous>    MEDICATIONS  (PRN):  oxycodone    5 mG/acetaminophen 325 mG 1 Tablet(s) Oral every 6 hours PRN Mild Pain (1 - 3)  oxycodone    5 mG/acetaminophen 325 mG 2 Tablet(s) Oral every 6 hours PRN Moderate Pain (4 - 6)      LABS:                        11.5   11.59 )-----------( 117      ( 17 Dec 2020 13:43 )             34.8     12-17    142  |  107  |  22  ----------------------------<  173<H>  3.7   |  21<L>  |  1.03    Ca    9.9      17 Dec 2020 13:44    TPro  5.3<L>  /  Alb  3.7  /  TBili  0.9  /  DBili  x   /  AST  25  /  ALT  13  /  AlkPhos  59  12-17    PT/INR - ( 17 Dec 2020 13:43 )   PT: 15.0 sec;   INR: 1.26 ratio         PTT - ( 17 Dec 2020 13:43 )  PTT:25.8 sec    Arterial Blood Gas:  12-17 @ 19:01  7.37/34/123/20/99/-4.4  ABG lactate: --  Arterial Blood Gas:  12-17 @ 18:03  7.33/39/119/20/98/-5.0  ABG lactate: --  Arterial Blood Gas:  12-17 @ 17:11  7.32/41/120/21/98/-4.6  ABG lactate: --  Arterial Blood Gas:  12-17 @ 16:13  7.32/42/135/20/99/-4.8  ABG lactate: --  Arterial Blood Gas:  12-17 @ 15:44  7.33/40/101/21/97/-4.2  ABG lactate: --  Arterial Blood Gas:  12-17 @ 14:42  7.33/42/122/22/98/-3.3  ABG lactate: --  Arterial Blood Gas:  12-17 @ 13:39  7.34/43/217/22/99/-2.7  ABG lactate: --  Arterial Blood Gas:  12-17 @ 12:43  7.37/39/210/22/100/-2.3  ABG lactate: --  Arterial Blood Gas:  12-17 @ 12:10  7.37/41/239/23/100/-1.5  ABG lactate: --  Arterial Blood Gas:  12-17 @ 11:29  7.35/47/439/25/100/-.2  ABG lactate: --  Arterial Blood Gas:  12-17 @ 10:56  7.43/38/560/24/100/.6  ABG lactate: --  Arterial Blood Gas:  12-17 @ 10:28  7.37/35/528/20/100/-4.0  ABG lactate: --  Arterial Blood Gas:  12-17 @ 08:10  7.40/40/337/24/100/-.1  ABG lactate: --    Venous Blood Gas:  12-17 @ 18:03  7.29/47/51/22/78  VBG Lactate: --  Venous Blood Gas:  12-17 @ 16:09  7.28/48/50/22/77  VBG Lactate: --  Venous Blood Gas:  12-17 @ 14:42  7.29/49/52/23/80  VBG Lactate: --  Venous Blood Gas:  12-17 @ 11:29  7.31/52/59/25/87  VBG Lactate: 2.4  Venous Blood Gas:  12-17 @ 10:57  7.39/45/70/26/94  VBG Lactate: 2.4  Venous Blood Gas:  12-17 @ 10:28  7.34/41/60/21/90  VBG Lactate: 1.5    CARDIAC MARKERS ( 17 Dec 2020 13:44 )  x     / x     / 270 U/L / x     / 21.2 ng/mL    LIVER FUNCTIONS - ( 17 Dec 2020 13:44 )  Alb: 3.7 g/dL / Pro: 5.3 g/dL / ALK PHOS: 59 U/L / ALT: 13 U/L / AST: 25 U/L / GGT: x               MICROBIOLOGY:   N/A    RADIOLOGY:  CXR 12/17: Lines and tubes in satisfactory position. Small left basilar pleural effusion and atelectasis. The right lung is clear. No pneumothorax.    Assessment:  74 yo Male poor historian with PMHx of HTN, HLD, DM2, BPH and OA who presented to [facility] today for an elective cardiac cath.  Patient was seen and evaluated by Cardiologist Dr. Horvath for routine visit and patient was fount to have symptoms of CP with WINN and SOB x 5 months. Cardiac cath finding revealed significant multivessel CAD.  Preliminary cath finding:  EF 55%; LM Distal 50%, LAD Prox 50%Lcx Ostial 80%, OM closed 100%, RCA Closed 100%.    CAD S/P CABG x 3L on 12/17, POD #0  Diabetes Mellitus Type 2  Anemia  Thrombocytopenia     Plan:   ***Neuro***  Off sedation, continue to assess neurological status per ICU protocol.   Percocet PRN for analgesia    ***Cardiovascular***  CAD S/P CABG x 3L on 12/17, POD #0  Requiring vasopressor support with IV Levo infusion  Continue hemodynamic monitoring, assess perfusion indices  Hematocrit 33%  Monitor chest tube outputs  Continue ASA 325mg QD for graft patency  Monitor continuous telemetry    ***Pulmonary***  On full vent support, settings as below:   RR (machine): 16  TV (machine): 650  FiO2: 50  PEEP: 5  Requiring close monitoring of pulse oximetry monitoring, and intermittent ABG's, CXR's.    ***Hematology***  H/H 10.6/33.   Continue to monitor hemoglobin and hematocrit levels.     ***GI***  NPO after recent procedure, advance diet as tolerated.   Continue Protonix for stress ulcer prophylaxis.   Reglan for gut motility.  Bowel regimen with Miralax.     ***Renal***  Continue to monitor I/Os, BUN/Creatinine, and urine output.   Goal net even fluid balance. Replete lytes PRN. Keep K> 4 and Mg >2.     ***ID***  Afebrile, WBC mildly elevated at 11.59.  Monitor temperature curve and trend WBC.   Continue Cefuroxime for perioperative antibiotic coverage.    ***Endocrine***  History of Type 2 Diabetes Mellitus  Requiring glucose control with insulin gtt, titrate as per insulin drip protocol along with hourly glucose checks.       I have spent 30 minutes providing critical care management to this patient.    By signing my name below, I, Mary Carmen Maldonado, attest that this documentation has been prepared under the direction and in the presence of Heide Jimenez MD   Electronically signed: Maria Elena Mendieta, 12-17-20 @ 19:35    I, Heide Jimenez, personally performed the services described in this documentation. all medical record entries made by the scribe were at my direction and in my presence. I have reviewed the chart and agree that the record reflects my personal performance and is accurate and complete  Electronically signed: Heide Jimenez MD  Patient seen and examined at the bedside.      Remained critically ill on continuous ICU monitoring.    OBJECTIVE:  ICU Vital Signs Last 24 Hrs  T(C): 36.9 (17 Dec 2020 19:00), Max: 36.9 (17 Dec 2020 19:00)  T(F): 98.4 (17 Dec 2020 19:00), Max: 98.4 (17 Dec 2020 19:00)  HR: 97 (17 Dec 2020 19:30) (69 - 106)  BP: 152/85 (17 Dec 2020 07:31) (127/67 - 152/85)  BP(mean): 107 (17 Dec 2020 07:31) (107 - 107)  ABP: 157/60 (17 Dec 2020 19:30) (115/55 - 157/60)  ABP(mean): 88 (17 Dec 2020 19:30) (71 - 88)  RR: 16 (17 Dec 2020 19:30) (12 - 37)  SpO2: 100% (17 Dec 2020 19:30) (97% - 100%)    Mode: AC/ CMV (Assist Control/ Continuous Mandatory Ventilation), RR (machine): 16, TV (machine): 650, FiO2: 50, PEEP: 5, ITime: 1, MAP: 10, PIP: 24    12-16 @ 07:01  -  12-17 @ 07:00  --------------------------------------------------------  IN: 760 mL / OUT: 1125 mL / NET: -365 mL    12-17 @ 07:01  -  12-17 @ 19:35  --------------------------------------------------------  IN: 2758.2 mL / OUT: 440 mL / NET: 2318.2 mL      CAPILLARY BLOOD GLUCOSE  117 (17 Dec 2020 19:00)      POCT Blood Glucose.: 151 mg/dL (17 Dec 2020 14:04)        Review of Systems:  Unable to obtain, pt is intubated and sedated.     PHYSICAL EXAM:  Daily Height in cm: 172.72 (17 Dec 2020 07:31)    General: , sedated, intubated  Neurology: Sedated  Eyes: PERRLA  ENT/Neck: Neck supple, trachea midline, No JVD, Gross hearing intact  Respiratory: + ETT midline. No wheezing, rhonchi. Rales noted bilaterally.  CV: RRR, S1S2, no murmurs, rubs or gallops  Abdominal: Soft, NT, ND +BS,   Extremities: 1-2+ pedal edema noted, + peripheral pulses  Skin: No Rashes, Hematoma, Ecchymosis      LINES:  [x] Arterial Line   [x] Central Line  [ ] PA catheter  [ ] IABP  [ ] ECMO  [ ] LVAD  [x] Ventilator  [ x] pacemaker [ ] Impella    Beside evaluation, monitoring, treatment of hemodynamics, fluids, IVP/IVCD meds,         Ventilator (x)  Mode: AC/ CMV (Assist Control/ Continuous Mandatory Ventilation)  RR (machine): 16  TV (machine): 650  FiO2: 50  PEEP: 5  ITime: 1  MAP: 10  PIP: 24              Hemodynamic lability, instability. Requires IVCD [x] vasopressors [ ] inotropes  [ ] vasodilator  [x]IVSS fluid  to maintain MAP, perfusion, C.I.     Ventilator Management:  [x]AC-rest    [ ]CPAP-PS Wean    [ ]Trach Collar     [ ]Extubate    [ ] T-Piece  [ ]peep>5     ALL MEDICATIONS:  MEDICATIONS  (STANDING):  aspirin enteric coated 325 milliGRAM(s) Oral daily  aspirin Suppository 300 milliGRAM(s) Rectal once  cefuroxime  IVPB 1500 milliGRAM(s) IV Intermittent every 8 hours  chlorhexidine 0.12% Liquid 5 milliLiter(s) Oral Mucosa two times a day  chlorhexidine 2% Cloths 1 Application(s) Topical <User Schedule>  dextrose 5%. 1000 milliLiter(s) (15 mL/Hr) IV Continuous <Continuous>  dextrose 50% Injectable 50 milliLiter(s) IV Push every 15 minutes  dextrose 50% Injectable 25 milliLiter(s) IV Push every 15 minutes  insulin regular Infusion 3 Unit(s)/Hr (3 mL/Hr) IV Continuous <Continuous>  insulin regular Infusion 4 Unit(s)/Hr (4 mL/Hr) IV Continuous <Continuous>  meperidine     Injectable 25 milliGRAM(s) IV Push once  metoclopramide Injectable 10 milliGRAM(s) IV Push every 8 hours  norepinephrine Infusion 0.05 MICROgram(s)/kG/Min (7.84 mL/Hr) IV Continuous <Continuous>  pantoprazole  Injectable 40 milliGRAM(s) IV Push daily  polyethylene glycol 3350 17 Gram(s) Oral daily  potassium chloride  10 mEq/50 mL IVPB 10 milliEquivalent(s) IV Intermittent every 1 hour  potassium chloride  10 mEq/50 mL IVPB 10 milliEquivalent(s) IV Intermittent every 1 hour  potassium chloride  10 mEq/50 mL IVPB 10 milliEquivalent(s) IV Intermittent every 1 hour  sodium chloride 0.9%. 1000 milliLiter(s) (10 mL/Hr) IV Continuous <Continuous>    MEDICATIONS  (PRN):  oxycodone    5 mG/acetaminophen 325 mG 1 Tablet(s) Oral every 6 hours PRN Mild Pain (1 - 3)  oxycodone    5 mG/acetaminophen 325 mG 2 Tablet(s) Oral every 6 hours PRN Moderate Pain (4 - 6)      LABS:                        11.5   11.59 )-----------( 117      ( 17 Dec 2020 13:43 )             34.8     12-17    142  |  107  |  22  ----------------------------<  173<H>  3.7   |  21<L>  |  1.03    Ca    9.9      17 Dec 2020 13:44    TPro  5.3<L>  /  Alb  3.7  /  TBili  0.9  /  DBili  x   /  AST  25  /  ALT  13  /  AlkPhos  59  12-17    PT/INR - ( 17 Dec 2020 13:43 )   PT: 15.0 sec;   INR: 1.26 ratio         PTT - ( 17 Dec 2020 13:43 )  PTT:25.8 sec    Arterial Blood Gas:  12-17 @ 19:01  7.37/34/123/20/99/-4.4  ABG lactate: --  Arterial Blood Gas:  12-17 @ 18:03  7.33/39/119/20/98/-5.0  ABG lactate: --  Arterial Blood Gas:  12-17 @ 17:11  7.32/41/120/21/98/-4.6  ABG lactate: --  Arterial Blood Gas:  12-17 @ 16:13  7.32/42/135/20/99/-4.8  ABG lactate: --  Arterial Blood Gas:  12-17 @ 15:44  7.33/40/101/21/97/-4.2  ABG lactate: --  Arterial Blood Gas:  12-17 @ 14:42  7.33/42/122/22/98/-3.3  ABG lactate: --  Arterial Blood Gas:  12-17 @ 13:39  7.34/43/217/22/99/-2.7  ABG lactate: --  Arterial Blood Gas:  12-17 @ 12:43  7.37/39/210/22/100/-2.3  ABG lactate: --  Arterial Blood Gas:  12-17 @ 12:10  7.37/41/239/23/100/-1.5  ABG lactate: --  Arterial Blood Gas:  12-17 @ 11:29  7.35/47/439/25/100/-.2  ABG lactate: --  Arterial Blood Gas:  12-17 @ 10:56  7.43/38/560/24/100/.6  ABG lactate: --  Arterial Blood Gas:  12-17 @ 10:28  7.37/35/528/20/100/-4.0  ABG lactate: --  Arterial Blood Gas:  12-17 @ 08:10  7.40/40/337/24/100/-.1  ABG lactate: --    Venous Blood Gas:  12-17 @ 18:03  7.29/47/51/22/78  VBG Lactate: --  Venous Blood Gas:  12-17 @ 16:09  7.28/48/50/22/77  VBG Lactate: --  Venous Blood Gas:  12-17 @ 14:42  7.29/49/52/23/80  VBG Lactate: --  Venous Blood Gas:  12-17 @ 11:29  7.31/52/59/25/87  VBG Lactate: 2.4  Venous Blood Gas:  12-17 @ 10:57  7.39/45/70/26/94  VBG Lactate: 2.4  Venous Blood Gas:  12-17 @ 10:28  7.34/41/60/21/90  VBG Lactate: 1.5    CARDIAC MARKERS ( 17 Dec 2020 13:44 )  x     / x     / 270 U/L / x     / 21.2 ng/mL    LIVER FUNCTIONS - ( 17 Dec 2020 13:44 )  Alb: 3.7 g/dL / Pro: 5.3 g/dL / ALK PHOS: 59 U/L / ALT: 13 U/L / AST: 25 U/L / GGT: x               MICROBIOLOGY:   N/A    RADIOLOGY:  CXR 12/17: Lines and tubes in satisfactory position. Small left basilar pleural effusion and atelectasis. The right lung is clear. No pneumothorax.    Assessment:  74 yo Male Hx of HTN, HLD, DM2, BPH and OA who presented to [facility] today for an elective cardiac cath.  Patient was seen and evaluated by Cardiologist Dr. Horvath for routine visit and patient was fount to have symptoms of CP with WINN and SOB x 5 months. Cardiac cath finding revealed significant multivessel CAD.  Preliminary cath finding:  EF 55%; LM Distal 50%, LAD Prox 50%Lcx Ostial 80%, OM closed 100%, RCA Closed 100%.    CAD S/P CABG x 3 POD #0  Post op hypovolemic shock  Lactate acidosis  Diabetes Mellitus Type 2/ Hyperglycemia      Plan:   ***Neuro***  Off sedation, continue to assess neurological status per ICU protocol.   Percocet PRN for analgesia    ***Cardiovascular***  CAD S/P CABG x 3L on 12/17, POD #0  low CVP Hypotension  Rising Lactates  Cont Volume resuscitation Target CVP 12-14  Requiring vasopressor support with Levo infusion  Continue hemodynamic monitoring, assess perfusion indices, serial lactates  ScVo2   Hematocrit 33%  Monitor chest tube outputs  Continue ASA 325mg QD & Statins   Back up pacing in Place   Maintain K >4 Mg >2  monitor for arrthymias    ***Pulmonary***  On full vent support, settings as below:   RR (machine): 16  TV (machine): 650  FiO2: 50  PEEP: 5  Requiring close monitoring of pulse oximetry monitoring, and intermittent ABG's, CXR's.  wean to to extubate once hemodynamically stable     ***Hematology***  H/H 10.6/33.   Continue to monitor hemoglobin and hematocrit levels.     ***GI***  NPO after recent procedure, advance diet as tolerated.   Continue Protonix for stress ulcer prophylaxis.   Reglan for gut motility.  Bowel regimen with Miralax.     ***Renal***  BPH  Continue to monitor I/Os, BUN/Creatinine, and urine output.   Goal net even fluid balance. Replete lytes PRN. Keep K> 4 and Mg >2.   Maintain Flores will resume Flomax     ***ID***  Afebrile, WBC mildly elevated at 11.59.  Monitor temperature curve and trend WBC.   Continue Cefuroxime for perioperative antibiotic coverage.    ***Endocrine***  History of Type 2 Diabetes Mellitus  Requiring glucose control with insulin gtt, titrate as per insulin drip protocol along with hourly glucose checks.       I have spent 30 minutes providing critical care management to this patient.    By signing my name below, I, Mary Carmen Maldonado, attest that this documentation has been prepared under the direction and in the presence of Heide Jimenez MD   Electronically signed: Maria Elena Mendieta, 12-17-20 @ 19:35    I, Heide Jimenez, personally performed the services described in this documentation. all medical record entries made by the scribe were at my direction and in my presence. I have reviewed the chart and agree that the record reflects my personal performance and is accurate and complete  Electronically signed: Heide Jimenez MD

## 2020-12-17 NOTE — BRIEF OPERATIVE NOTE - TYPE OF ANESTHESIA
"Subjective:      Xena Godinez is a 54 y.o. female who presents with Cough            HPI Comments: Medications, Allergies and Prior Medical Hx reviewed and updated in [facility].with patient/family today         Cough  This is a new problem. The current episode started in the past 7 days. The problem has been gradually worsening. The cough is productive of sputum. Associated symptoms include ear pain, nasal congestion, rhinorrhea, a sore throat and wheezing. Pertinent negatives include no chills, fever, myalgias or shortness of breath. She has tried OTC cough suppressant for the symptoms. The treatment provided no relief. There is no history of asthma, COPD or emphysema.       Review of Systems   Constitutional: Positive for malaise/fatigue. Negative for fever and chills.   HENT: Positive for congestion, ear pain, rhinorrhea and sore throat. Negative for ear discharge.    Respiratory: Positive for cough, sputum production and wheezing. Negative for shortness of breath.    Gastrointestinal: Negative for nausea, vomiting and diarrhea.   Musculoskeletal: Negative for myalgias.   Neurological: Positive for weakness.          Objective:     /86 mmHg  Pulse 86  Temp(Src) 36.7 °C (98.1 °F)  Ht 1.6 m (5' 2.99\")  Wt 76.204 kg (168 lb)  BMI 29.77 kg/m2  SpO2 97%     Physical Exam   Constitutional: She is oriented to person, place, and time. She appears well-developed and well-nourished. No distress.   HENT:   Head: Normocephalic and atraumatic.   Right Ear: Tympanic membrane and ear canal normal.   Left Ear: Tympanic membrane and ear canal normal.   Nose: Rhinorrhea present.   Mouth/Throat: Uvula is midline and mucous membranes are normal. Posterior oropharyngeal edema and posterior oropharyngeal erythema present. No oropharyngeal exudate.   Eyes: Conjunctivae are normal. Pupils are equal, round, and reactive to light.   Neck: Neck supple.   Cardiovascular: Normal rate, regular rhythm and normal heart sounds.  "   Pulmonary/Chest: Effort normal and breath sounds normal. No respiratory distress. She has no wheezes.   Lymphadenopathy:     She has cervical adenopathy.   Neurological: She is alert and oriented to person, place, and time.   Skin: Skin is warm and dry.   Psychiatric: She has a normal mood and affect. Her behavior is normal.   Vitals reviewed.              Assessment/Plan:     1. Acute URI  azithromycin (ZITHROMAX) 250 MG Tab    guaifenesin-codeine (CHERATUSSIN AC) Solution oral solution    albuterol 108 (90 BASE) MCG/ACT Aero Soln inhalation aerosol   2. Acute suppurative otitis media of both ears without spontaneous rupture of tympanic membranes, recurrence not specified  azithromycin (ZITHROMAX) 250 MG Tab    guaifenesin-codeine (CHERATUSSIN AC) Solution oral solution    albuterol 108 (90 BASE) MCG/ACT Aero Soln inhalation aerosol       Do not drink alcohol or operate machinery with this medication  Pt reviewed on [place]  Aware,  no remarkable controlled substance prescription documentation noted    Modified Epic generated written imformation provided along with verbal instructions    Rest, Fluids, tylenol, ibuprofen,  humidified air, and otc decongestants  Pt will go to the ER for worsening or changing symptoms as discussed,   Follow-up with your primary care provider or return here if not improving in 5 - 7 days   Discharge instructions discussed with pt/family who verbalize understanding and agreement with poc         General

## 2020-12-18 DIAGNOSIS — I10 ESSENTIAL (PRIMARY) HYPERTENSION: ICD-10-CM

## 2020-12-18 LAB
ALBUMIN SERPL ELPH-MCNC: 4.1 G/DL — SIGNIFICANT CHANGE UP (ref 3.3–5)
ALP SERPL-CCNC: 49 U/L — SIGNIFICANT CHANGE UP (ref 40–120)
ALT FLD-CCNC: 13 U/L — SIGNIFICANT CHANGE UP (ref 10–45)
ANION GAP SERPL CALC-SCNC: 12 MMOL/L — SIGNIFICANT CHANGE UP (ref 5–17)
APTT BLD: 29.1 SEC — SIGNIFICANT CHANGE UP (ref 27.5–35.5)
AST SERPL-CCNC: 32 U/L — SIGNIFICANT CHANGE UP (ref 10–40)
BILIRUB SERPL-MCNC: 1 MG/DL — SIGNIFICANT CHANGE UP (ref 0.2–1.2)
BUN SERPL-MCNC: 20 MG/DL — SIGNIFICANT CHANGE UP (ref 7–23)
CALCIUM SERPL-MCNC: 9.5 MG/DL — SIGNIFICANT CHANGE UP (ref 8.4–10.5)
CHLORIDE SERPL-SCNC: 107 MMOL/L — SIGNIFICANT CHANGE UP (ref 96–108)
CO2 SERPL-SCNC: 21 MMOL/L — LOW (ref 22–31)
CREAT SERPL-MCNC: 0.99 MG/DL — SIGNIFICANT CHANGE UP (ref 0.5–1.3)
GAS PNL BLDA: SIGNIFICANT CHANGE UP
GLUCOSE BLDC GLUCOMTR-MCNC: 100 MG/DL — HIGH (ref 70–99)
GLUCOSE BLDC GLUCOMTR-MCNC: 104 MG/DL — HIGH (ref 70–99)
GLUCOSE BLDC GLUCOMTR-MCNC: 119 MG/DL — HIGH (ref 70–99)
GLUCOSE BLDC GLUCOMTR-MCNC: 123 MG/DL — HIGH (ref 70–99)
GLUCOSE BLDC GLUCOMTR-MCNC: 124 MG/DL — HIGH (ref 70–99)
GLUCOSE BLDC GLUCOMTR-MCNC: 127 MG/DL — HIGH (ref 70–99)
GLUCOSE BLDC GLUCOMTR-MCNC: 129 MG/DL — HIGH (ref 70–99)
GLUCOSE BLDC GLUCOMTR-MCNC: 138 MG/DL — HIGH (ref 70–99)
GLUCOSE BLDC GLUCOMTR-MCNC: 140 MG/DL — HIGH (ref 70–99)
GLUCOSE BLDC GLUCOMTR-MCNC: 150 MG/DL — HIGH (ref 70–99)
GLUCOSE BLDC GLUCOMTR-MCNC: 152 MG/DL — HIGH (ref 70–99)
GLUCOSE BLDC GLUCOMTR-MCNC: 155 MG/DL — HIGH (ref 70–99)
GLUCOSE SERPL-MCNC: 143 MG/DL — HIGH (ref 70–99)
HCT VFR BLD CALC: 30.8 % — LOW (ref 39–50)
HGB BLD-MCNC: 10.4 G/DL — LOW (ref 13–17)
INR BLD: 1.11 RATIO — SIGNIFICANT CHANGE UP (ref 0.88–1.16)
MAGNESIUM SERPL-MCNC: 2.4 MG/DL — SIGNIFICANT CHANGE UP (ref 1.6–2.6)
MCHC RBC-ENTMCNC: 30.5 PG — SIGNIFICANT CHANGE UP (ref 27–34)
MCHC RBC-ENTMCNC: 33.8 GM/DL — SIGNIFICANT CHANGE UP (ref 32–36)
MCV RBC AUTO: 90.3 FL — SIGNIFICANT CHANGE UP (ref 80–100)
PHOSPHATE SERPL-MCNC: 3.1 MG/DL — SIGNIFICANT CHANGE UP (ref 2.5–4.5)
PLATELET # BLD AUTO: 105 K/UL — LOW (ref 150–400)
POTASSIUM SERPL-MCNC: 4.9 MMOL/L — SIGNIFICANT CHANGE UP (ref 3.5–5.3)
POTASSIUM SERPL-SCNC: 4.9 MMOL/L — SIGNIFICANT CHANGE UP (ref 3.5–5.3)
PROT SERPL-MCNC: 5.9 G/DL — LOW (ref 6–8.3)
PROTHROM AB SERPL-ACNC: 13.2 SEC — SIGNIFICANT CHANGE UP (ref 10.6–13.6)
RBC # BLD: 3.41 M/UL — LOW (ref 4.2–5.8)
RBC # FLD: 12.7 % — SIGNIFICANT CHANGE UP (ref 10.3–14.5)
SODIUM SERPL-SCNC: 140 MMOL/L — SIGNIFICANT CHANGE UP (ref 135–145)
WBC # BLD: 8.78 K/UL — SIGNIFICANT CHANGE UP (ref 3.8–10.5)
WBC # FLD AUTO: 8.78 K/UL — SIGNIFICANT CHANGE UP (ref 3.8–10.5)

## 2020-12-18 PROCEDURE — 71045 X-RAY EXAM CHEST 1 VIEW: CPT | Mod: 26

## 2020-12-18 PROCEDURE — 71045 X-RAY EXAM CHEST 1 VIEW: CPT | Mod: 26,77

## 2020-12-18 PROCEDURE — 93010 ELECTROCARDIOGRAM REPORT: CPT

## 2020-12-18 PROCEDURE — 99233 SBSQ HOSP IP/OBS HIGH 50: CPT

## 2020-12-18 RX ORDER — GLUCAGON INJECTION, SOLUTION 0.5 MG/.1ML
1 INJECTION, SOLUTION SUBCUTANEOUS ONCE
Refills: 0 | Status: DISCONTINUED | OUTPATIENT
Start: 2020-12-18 | End: 2020-12-20

## 2020-12-18 RX ORDER — HYDROMORPHONE HYDROCHLORIDE 2 MG/ML
0.5 INJECTION INTRAMUSCULAR; INTRAVENOUS; SUBCUTANEOUS ONCE
Refills: 0 | Status: DISCONTINUED | OUTPATIENT
Start: 2020-12-18 | End: 2020-12-18

## 2020-12-18 RX ORDER — FUROSEMIDE 40 MG
40 TABLET ORAL ONCE
Refills: 0 | Status: COMPLETED | OUTPATIENT
Start: 2020-12-18 | End: 2020-12-18

## 2020-12-18 RX ORDER — AMLODIPINE BESYLATE 2.5 MG/1
10 TABLET ORAL DAILY
Refills: 0 | Status: DISCONTINUED | OUTPATIENT
Start: 2020-12-19 | End: 2020-12-22

## 2020-12-18 RX ORDER — FUROSEMIDE 40 MG
20 TABLET ORAL ONCE
Refills: 0 | Status: COMPLETED | OUTPATIENT
Start: 2020-12-18 | End: 2020-12-18

## 2020-12-18 RX ORDER — OXYCODONE AND ACETAMINOPHEN 5; 325 MG/1; MG/1
2 TABLET ORAL EVERY 6 HOURS
Refills: 0 | Status: DISCONTINUED | OUTPATIENT
Start: 2020-12-18 | End: 2020-12-22

## 2020-12-18 RX ORDER — FUROSEMIDE 40 MG
20 TABLET ORAL DAILY
Refills: 0 | Status: DISCONTINUED | OUTPATIENT
Start: 2020-12-19 | End: 2020-12-20

## 2020-12-18 RX ORDER — OXYCODONE AND ACETAMINOPHEN 5; 325 MG/1; MG/1
1 TABLET ORAL EVERY 6 HOURS
Refills: 0 | Status: DISCONTINUED | OUTPATIENT
Start: 2020-12-18 | End: 2020-12-22

## 2020-12-18 RX ORDER — HEPARIN SODIUM 5000 [USP'U]/ML
5000 INJECTION INTRAVENOUS; SUBCUTANEOUS EVERY 8 HOURS
Refills: 0 | Status: DISCONTINUED | OUTPATIENT
Start: 2020-12-18 | End: 2020-12-22

## 2020-12-18 RX ORDER — LISINOPRIL 2.5 MG/1
5 TABLET ORAL DAILY
Refills: 0 | Status: DISCONTINUED | OUTPATIENT
Start: 2020-12-18 | End: 2020-12-19

## 2020-12-18 RX ORDER — DEXTROSE 50 % IN WATER 50 %
12.5 SYRINGE (ML) INTRAVENOUS ONCE
Refills: 0 | Status: DISCONTINUED | OUTPATIENT
Start: 2020-12-18 | End: 2020-12-22

## 2020-12-18 RX ORDER — INSULIN LISPRO 100/ML
7 VIAL (ML) SUBCUTANEOUS
Refills: 0 | Status: DISCONTINUED | OUTPATIENT
Start: 2020-12-18 | End: 2020-12-20

## 2020-12-18 RX ORDER — ACETAMINOPHEN 500 MG
1000 TABLET ORAL ONCE
Refills: 0 | Status: COMPLETED | OUTPATIENT
Start: 2020-12-18 | End: 2020-12-18

## 2020-12-18 RX ORDER — DEXTROSE 50 % IN WATER 50 %
15 SYRINGE (ML) INTRAVENOUS ONCE
Refills: 0 | Status: DISCONTINUED | OUTPATIENT
Start: 2020-12-18 | End: 2020-12-22

## 2020-12-18 RX ORDER — INSULIN GLARGINE 100 [IU]/ML
25 INJECTION, SOLUTION SUBCUTANEOUS AT BEDTIME
Refills: 0 | Status: DISCONTINUED | OUTPATIENT
Start: 2020-12-18 | End: 2020-12-20

## 2020-12-18 RX ORDER — INSULIN LISPRO 100/ML
VIAL (ML) SUBCUTANEOUS
Refills: 0 | Status: DISCONTINUED | OUTPATIENT
Start: 2020-12-18 | End: 2020-12-22

## 2020-12-18 RX ORDER — INSULIN LISPRO 100/ML
VIAL (ML) SUBCUTANEOUS AT BEDTIME
Refills: 0 | Status: DISCONTINUED | OUTPATIENT
Start: 2020-12-18 | End: 2020-12-22

## 2020-12-18 RX ORDER — TAMSULOSIN HYDROCHLORIDE 0.4 MG/1
0.4 CAPSULE ORAL AT BEDTIME
Refills: 0 | Status: DISCONTINUED | OUTPATIENT
Start: 2020-12-18 | End: 2020-12-22

## 2020-12-18 RX ORDER — AMLODIPINE BESYLATE 2.5 MG/1
5 TABLET ORAL DAILY
Refills: 0 | Status: DISCONTINUED | OUTPATIENT
Start: 2020-12-18 | End: 2020-12-18

## 2020-12-18 RX ORDER — METOPROLOL TARTRATE 50 MG
25 TABLET ORAL EVERY 8 HOURS
Refills: 0 | Status: DISCONTINUED | OUTPATIENT
Start: 2020-12-18 | End: 2020-12-19

## 2020-12-18 RX ORDER — DEXTROSE 50 % IN WATER 50 %
25 SYRINGE (ML) INTRAVENOUS ONCE
Refills: 0 | Status: DISCONTINUED | OUTPATIENT
Start: 2020-12-18 | End: 2020-12-22

## 2020-12-18 RX ORDER — METOPROLOL TARTRATE 50 MG
25 TABLET ORAL
Refills: 0 | Status: DISCONTINUED | OUTPATIENT
Start: 2020-12-18 | End: 2020-12-18

## 2020-12-18 RX ORDER — NICARDIPINE HYDROCHLORIDE 30 MG/1
5 CAPSULE, EXTENDED RELEASE ORAL
Qty: 40 | Refills: 0 | Status: DISCONTINUED | OUTPATIENT
Start: 2020-12-18 | End: 2020-12-19

## 2020-12-18 RX ORDER — FAMOTIDINE 10 MG/ML
20 INJECTION INTRAVENOUS
Refills: 0 | Status: DISCONTINUED | OUTPATIENT
Start: 2020-12-18 | End: 2020-12-21

## 2020-12-18 RX ADMIN — Medication 40 MILLIGRAM(S): at 21:42

## 2020-12-18 RX ADMIN — Medication 10 MILLIGRAM(S): at 21:02

## 2020-12-18 RX ADMIN — FAMOTIDINE 20 MILLIGRAM(S): 10 INJECTION INTRAVENOUS at 17:55

## 2020-12-18 RX ADMIN — HYDROMORPHONE HYDROCHLORIDE 0.5 MILLIGRAM(S): 2 INJECTION INTRAMUSCULAR; INTRAVENOUS; SUBCUTANEOUS at 11:20

## 2020-12-18 RX ADMIN — OXYCODONE AND ACETAMINOPHEN 2 TABLET(S): 5; 325 TABLET ORAL at 13:29

## 2020-12-18 RX ADMIN — INSULIN GLARGINE 25 UNIT(S): 100 INJECTION, SOLUTION SUBCUTANEOUS at 21:23

## 2020-12-18 RX ADMIN — HEPARIN SODIUM 5000 UNIT(S): 5000 INJECTION INTRAVENOUS; SUBCUTANEOUS at 21:02

## 2020-12-18 RX ADMIN — HYDROMORPHONE HYDROCHLORIDE 0.5 MILLIGRAM(S): 2 INJECTION INTRAMUSCULAR; INTRAVENOUS; SUBCUTANEOUS at 08:45

## 2020-12-18 RX ADMIN — HYDROMORPHONE HYDROCHLORIDE 0.5 MILLIGRAM(S): 2 INJECTION INTRAMUSCULAR; INTRAVENOUS; SUBCUTANEOUS at 02:45

## 2020-12-18 RX ADMIN — Medication 25 MILLIGRAM(S): at 05:04

## 2020-12-18 RX ADMIN — Medication 1000 MILLIGRAM(S): at 12:25

## 2020-12-18 RX ADMIN — Medication 325 MILLIGRAM(S): at 12:41

## 2020-12-18 RX ADMIN — Medication 400 MILLIGRAM(S): at 11:54

## 2020-12-18 RX ADMIN — HYDROMORPHONE HYDROCHLORIDE 0.5 MILLIGRAM(S): 2 INJECTION INTRAMUSCULAR; INTRAVENOUS; SUBCUTANEOUS at 02:30

## 2020-12-18 RX ADMIN — NICARDIPINE HYDROCHLORIDE 25 MG/HR: 30 CAPSULE, EXTENDED RELEASE ORAL at 03:25

## 2020-12-18 RX ADMIN — Medication 1000 MILLIGRAM(S): at 06:00

## 2020-12-18 RX ADMIN — HYDROMORPHONE HYDROCHLORIDE 0.5 MILLIGRAM(S): 2 INJECTION INTRAMUSCULAR; INTRAVENOUS; SUBCUTANEOUS at 00:10

## 2020-12-18 RX ADMIN — Medication 100 MILLIGRAM(S): at 17:56

## 2020-12-18 RX ADMIN — Medication 10 MILLIGRAM(S): at 05:03

## 2020-12-18 RX ADMIN — HYDROMORPHONE HYDROCHLORIDE 0.5 MILLIGRAM(S): 2 INJECTION INTRAMUSCULAR; INTRAVENOUS; SUBCUTANEOUS at 06:15

## 2020-12-18 RX ADMIN — OXYCODONE AND ACETAMINOPHEN 2 TABLET(S): 5; 325 TABLET ORAL at 21:42

## 2020-12-18 RX ADMIN — Medication 20 MILLIGRAM(S): at 04:16

## 2020-12-18 RX ADMIN — Medication 100 MILLIGRAM(S): at 08:49

## 2020-12-18 RX ADMIN — ATORVASTATIN CALCIUM 80 MILLIGRAM(S): 80 TABLET, FILM COATED ORAL at 21:02

## 2020-12-18 RX ADMIN — Medication 25 MILLIGRAM(S): at 14:00

## 2020-12-18 RX ADMIN — Medication 25 MILLIGRAM(S): at 21:02

## 2020-12-18 RX ADMIN — HYDROMORPHONE HYDROCHLORIDE 0.5 MILLIGRAM(S): 2 INJECTION INTRAMUSCULAR; INTRAVENOUS; SUBCUTANEOUS at 10:00

## 2020-12-18 RX ADMIN — AMLODIPINE BESYLATE 5 MILLIGRAM(S): 2.5 TABLET ORAL at 08:48

## 2020-12-18 RX ADMIN — Medication 10 MILLIGRAM(S): at 14:30

## 2020-12-18 RX ADMIN — OXYCODONE AND ACETAMINOPHEN 2 TABLET(S): 5; 325 TABLET ORAL at 14:00

## 2020-12-18 RX ADMIN — HYDROMORPHONE HYDROCHLORIDE 0.5 MILLIGRAM(S): 2 INJECTION INTRAMUSCULAR; INTRAVENOUS; SUBCUTANEOUS at 08:28

## 2020-12-18 RX ADMIN — OXYCODONE AND ACETAMINOPHEN 2 TABLET(S): 5; 325 TABLET ORAL at 22:15

## 2020-12-18 RX ADMIN — LISINOPRIL 5 MILLIGRAM(S): 2.5 TABLET ORAL at 22:18

## 2020-12-18 RX ADMIN — HEPARIN SODIUM 5000 UNIT(S): 5000 INJECTION INTRAVENOUS; SUBCUTANEOUS at 14:31

## 2020-12-18 RX ADMIN — Medication 400 MILLIGRAM(S): at 05:30

## 2020-12-18 RX ADMIN — POLYETHYLENE GLYCOL 3350 17 GRAM(S): 17 POWDER, FOR SOLUTION ORAL at 12:41

## 2020-12-18 RX ADMIN — CHLORHEXIDINE GLUCONATE 1 APPLICATION(S): 213 SOLUTION TOPICAL at 05:03

## 2020-12-18 RX ADMIN — Medication 1: at 21:43

## 2020-12-18 RX ADMIN — INSULIN HUMAN 4 UNIT(S)/HR: 100 INJECTION, SOLUTION SUBCUTANEOUS at 04:11

## 2020-12-18 RX ADMIN — Medication 20 MILLIGRAM(S): at 12:41

## 2020-12-18 RX ADMIN — HYDROMORPHONE HYDROCHLORIDE 0.5 MILLIGRAM(S): 2 INJECTION INTRAMUSCULAR; INTRAVENOUS; SUBCUTANEOUS at 06:30

## 2020-12-18 RX ADMIN — TAMSULOSIN HYDROCHLORIDE 0.4 MILLIGRAM(S): 0.4 CAPSULE ORAL at 21:02

## 2020-12-18 NOTE — CONSULT NOTE ADULT - SUBJECTIVE AND OBJECTIVE BOX
HPI:  History of Present Illness:    76 yo Male poor historian with PMHx of HTN, HLD, DM2, BPH and OA who presented to [facility] today for an elective cardiac cath.  Patient was seen and evaluated by Cardiologist Dr. Horvath for routine visit and patient was fount to have symptoms of CP with WINN and SOB x 5 months. Cardiac cath finding revealed significant multivessel CAD.  Preliminary cath finding:  EF 55%; LM Distal 50%, LAD Prox 50%Lcx Ostial 80%, OM closed 100%, RCA Closed 100%. Patient transferred to [facility] for cardiac surgery evaluation with Dr. Hairston. At this time patient reports asymptomatic denies CP, palpitation, SOB, WINN, HA, dizziness, N/V/D, fever or chills.  No acute event noted overnight.      (14 Dec 2020 17:39)  Patient has history of diabetes, on oral meds at home, no recent hypoglycemic episodes. Patient follows up with PCP for diabetes management.    PAST MEDICAL & SURGICAL HISTORY:  Aneurysm of right leg    BPH (benign prostatic hyperplasia)    Osteoarthritis    Type 2 diabetes mellitus    HLD (hyperlipidemia)    HTN (hypertension)    CAD in native artery    BPH (benign prostatic hyperplasia)    OA (osteoarthritis)    DM (diabetes mellitus)    HLD (hyperlipidemia)    HTN (hypertension)    S/P appendectomy    H/O left inguinal hernia repair    S/P excision of ganglion cyst    S/P left inguinal hernia repair    S/P appendectomy        FAMILY HISTORY:  No pertinent family history in first degree relatives    No pertinent family history in first degree relatives        Social History:    Outpatient Medications:    MEDICATIONS  (STANDING):  aspirin enteric coated 325 milliGRAM(s) Oral daily  atorvastatin 80 milliGRAM(s) Oral at bedtime  cefuroxime  IVPB 1500 milliGRAM(s) IV Intermittent every 8 hours  chlorhexidine 2% Cloths 1 Application(s) Topical <User Schedule>  famotidine    Tablet 20 milliGRAM(s) Oral two times a day  heparin   Injectable 5000 Unit(s) SubCutaneous every 8 hours  insulin regular Infusion 4 Unit(s)/Hr (4 mL/Hr) IV Continuous <Continuous>  metoclopramide Injectable 10 milliGRAM(s) IV Push every 8 hours  metoprolol tartrate 25 milliGRAM(s) Oral every 8 hours  niCARdipine Infusion 5 mG/Hr (25 mL/Hr) IV Continuous <Continuous>  polyethylene glycol 3350 17 Gram(s) Oral daily  sodium chloride 0.9%. 1000 milliLiter(s) (10 mL/Hr) IV Continuous <Continuous>  tamsulosin 0.4 milliGRAM(s) Oral at bedtime    MEDICATIONS  (PRN):  oxycodone    5 mG/acetaminophen 325 mG 1 Tablet(s) Oral every 6 hours PRN Moderate Pain (4 - 6)  oxycodone    5 mG/acetaminophen 325 mG 2 Tablet(s) Oral every 6 hours PRN Severe Pain (7 - 10)      Allergies    No Known Allergies    Intolerances      Review of Systems:  Constitutional: No fever, no chills  Eyes: No blurry vision  Neuro: No tremors  HEENT: No pain, no neck swelling  Cardiovascular: No chest pain, no palpitations  Respiratory: No SOB, no cough  GI: No nausea, vomiting, abdominal pain  : No dysuria  Skin: no rash  MSK: Has leg swelling.  Psych: no depression  Endocrine: no polyuria, polydipsia    UNABLE TO OBTAIN    ALL OTHER SYSTEMS REVIEWED AND NEGATIVE        PHYSICAL EXAM:  VITALS: T(C): 36.2 (12-18-20 @ 16:00)  T(F): 97.2 (12-18-20 @ 16:00), Max: 99.7 (12-18-20 @ 08:00)  HR: 77 (12-18-20 @ 18:00) (73 - 101)  BP: 138/74 (12-18-20 @ 03:00) (138/74 - 153/80)  RR:  (9 - 37)  SpO2:  (86% - 100%)  Wt(kg): --  GENERAL: NAD, well-groomed, well-developed  EYES: No proptosis, no lid lag  HEENT:  Atraumatic, Normocephalic  THYROID: Normal size, no palpable nodules  RESPIRATORY: Clear to auscultation bilaterally; No rales, rhonchi, wheezing  CARDIOVASCULAR: Si S2, No murmurs;  GI: Soft, non distended, normal bowel sounds  SKIN: Dry, intact, No rashes or lesions  MUSCULOSKELETAL: Has BL lower extremity edema.  NEURO:  no tremor, sensation decreased in feet BL,    POCT Blood Glucose.: 129 mg/dL (12-18-20 @ 18:02)  POCT Blood Glucose.: 124 mg/dL (12-18-20 @ 17:11)  POCT Blood Glucose.: 123 mg/dL (12-18-20 @ 16:00)  POCT Blood Glucose.: 119 mg/dL (12-18-20 @ 14:46)  POCT Blood Glucose.: 129 mg/dL (12-18-20 @ 14:03)  POCT Blood Glucose.: 152 mg/dL (12-18-20 @ 13:03)  POCT Blood Glucose.: 138 mg/dL (12-18-20 @ 12:15)  POCT Blood Glucose.: 150 mg/dL (12-18-20 @ 11:25)  POCT Blood Glucose.: 155 mg/dL (12-18-20 @ 10:09)  POCT Blood Glucose.: 100 mg/dL (12-18-20 @ 09:01)  POCT Blood Glucose.: 104 mg/dL (12-18-20 @ 08:36)  POCT Blood Glucose.: 140 mg/dL (12-18-20 @ 06:05)  POCT Blood Glucose.: 127 mg/dL (12-18-20 @ 01:51)  POCT Blood Glucose.: 129 mg/dL (12-18-20 @ 01:00)  POCT Blood Glucose.: 105 mg/dL (12-17-20 @ 22:18)  POCT Blood Glucose.: 112 mg/dL (12-17-20 @ 20:03)  POCT Blood Glucose.: 151 mg/dL (12-17-20 @ 14:04)  POCT Blood Glucose.: 137 mg/dL (12-16-20 @ 21:50)  POCT Blood Glucose.: 161 mg/dL (12-16-20 @ 16:43)  POCT Blood Glucose.: 182 mg/dL (12-16-20 @ 11:28)  POCT Blood Glucose.: 157 mg/dL (12-16-20 @ 07:35)  POCT Blood Glucose.: 139 mg/dL (12-15-20 @ 21:33)                            10.4   8.78  )-----------( 105      ( 18 Dec 2020 01:15 )             30.8       12-18    140  |  107  |  20  ----------------------------<  143<H>  4.9   |  21<L>  |  0.99    EGFR if : 85  EGFR if non : 73    Ca    9.5      12-18  Mg     2.4     12-18  Phos  3.1     12-18    TPro  5.9<L>  /  Alb  4.1  /  TBili  1.0  /  DBili  x   /  AST  32  /  ALT  13  /  AlkPhos  49  12-18      Thyroid Function Tests:  12-15 @ 02:14 TSH 0.68 FreeT4 -- T3 99 Anti TPO -- Anti Thyroglobulin Ab -- TSI --              Radiology:

## 2020-12-18 NOTE — PHYSICAL THERAPY INITIAL EVALUATION ADULT - PERTINENT HX OF CURRENT PROBLEM, REHAB EVAL
Pt is a 74 y/o M with PMH of HTN & DM2 who presented to [facility] for an elective cardiac cath.  Patient found to have symptoms of CP with WINN and SOB for x5 months. Cardiac cath finding revealed significant multivessel CAD.  Patient transferred to [facility] for cardiac surgery evaluation with Dr. Hairston. Now s/p CABG x3 on 12/17.

## 2020-12-18 NOTE — CONSULT NOTE ADULT - PROBLEM SELECTOR RECOMMENDATION 9
Will start Lantus 25 units at bed time.  Will start Admelog 7 units before each meal in addition to Admelog correction scale coverage.  Patient counseled for compliance with consistent low carb diet.

## 2020-12-18 NOTE — PROGRESS NOTE ADULT - SUBJECTIVE AND OBJECTIVE BOX
Cardiovascular Disease Progress Note    Overnight events: No acute events overnight.  s/p 3v cabg. no new cardiac sx. out of bed to chair  Otherwise review of systems negative    Objective Findings:  T(C): 37 (20 @ 04:00), Max: 37.4 (20 @ 00:00)  HR: 76 (20 @ 07:00) (74 - 106)  BP: 138/74 (20 @ 03:00) (138/74 - 153/80)  RR: 12 (20 @ 07:00) (12 - 37)  SpO2: 94% (20 @ 07:00) (92% - 100%)  Wt(kg): --  Daily     Daily Weight in k.7 (18 Dec 2020 00:00)      Physical Exam:  Gen: NAD  HEENT: EOMI  CV: RRR, normal S1 + S2, no m/r/g. sternal incision c/d/i  Lungs: CTAB  Abd: soft, non-tender  Ext: No edema    Telemetry: nsr    Laboratory Data:                        10.4   8.78  )-----------( 105      ( 18 Dec 2020 01:15 )             30.8     12-18    140  |  107  |  20  ----------------------------<  143<H>  4.9   |  21<L>  |  0.99    Ca    9.5      18 Dec 2020 00:14  Phos  3.1       Mg     2.4         TPro  5.9<L>  /  Alb  4.1  /  TBili  1.0  /  DBili  x   /  AST  32  /  ALT  13  /  AlkPhos  49  12-18    PT/INR - ( 18 Dec 2020 00:14 )   PT: 13.2 sec;   INR: 1.11 ratio         PTT - ( 18 Dec 2020 00:14 )  PTT:29.1 sec  CARDIAC MARKERS ( 17 Dec 2020 13:44 )  x     / x     / 270 U/L / x     / 21.2 ng/mL          Inpatient Medications:  MEDICATIONS  (STANDING):  amLODIPine   Tablet 5 milliGRAM(s) Oral daily  aspirin enteric coated 325 milliGRAM(s) Oral daily  atorvastatin 80 milliGRAM(s) Oral at bedtime  cefuroxime  IVPB 1500 milliGRAM(s) IV Intermittent every 8 hours  chlorhexidine 2% Cloths 1 Application(s) Topical <User Schedule>  famotidine    Tablet 20 milliGRAM(s) Oral two times a day  heparin   Injectable 5000 Unit(s) SubCutaneous every 8 hours  insulin regular Infusion 4 Unit(s)/Hr (4 mL/Hr) IV Continuous <Continuous>  metoclopramide Injectable 10 milliGRAM(s) IV Push every 8 hours  metoprolol tartrate 25 milliGRAM(s) Oral two times a day  niCARdipine Infusion 5 mG/Hr (25 mL/Hr) IV Continuous <Continuous>  polyethylene glycol 3350 17 Gram(s) Oral daily  sodium chloride 0.9%. 1000 milliLiter(s) (10 mL/Hr) IV Continuous <Continuous>      Assessment:  -angina s/p lhc c/w mvd EF 55%; LM Distal 50%, LAD Prox 50%Lcx Ostial 80%, OM closed 100%, RCA Closed 100%. P  -DM  -HTN    Recs:  s/p 3v cabg with dr rodarte   wean high flow as able  diuresis prn to maintain euvolemmic  cw asa statin and beta blockers  tele monitoring  bp control      Over 25 minutes spent on total encounter; more than 50% of the visit was spent counseling and/or coordinating care by the attending physician.      Singh Hill MD   Cardiovascular Disease  [phone]

## 2020-12-18 NOTE — CONSULT NOTE ADULT - ASSESSMENT
Assessment  DMT2: 75y Male with DM T2 with hyperglycemia admitted with chest pain, s/p CABG, on IV insulin now, extubated, started eating meals, blood sugars improving, no hypoglycemic episode.  Patient is high risk with high level decision making due to uncontrolled diabetes, has increased risk for complications.  CAD: S/P CABG, on medications, monitored, stable.  HLD: On statin.          Alfa May MD  Cell: [phone]  Office: [phone]

## 2020-12-18 NOTE — PHYSICAL THERAPY INITIAL EVALUATION ADULT - ADDITIONAL COMMENTS
Pt reports living in a private home with his son. There are 3 steps to enter & 1 flight to the upstairs bedroom & bathroom. PTA, pt was independent with all functional mobility & ADL's without the use of an AD.

## 2020-12-18 NOTE — PROGRESS NOTE ADULT - SUBJECTIVE AND OBJECTIVE BOX
KENDALL DAWSON  MRN-[id number]  Patient is a 75y old  Male who presents with a chief complaint of Cardiac Surgery (18 Dec 2020 07:33)    HPI:  History of Present Illness:    74 yo Male poor historian with PMHx of HTN, HLD, DM2, BPH and OA who presented to [facility] today for an elective cardiac cath.  Patient was seen and evaluated by Cardiologist Dr. Horvath for routine visit and patient was fount to have symptoms of CP with WINN and SOB x 5 months. Cardiac cath finding revealed significant multivessel CAD.  Preliminary cath finding:  EF 55%; LM Distal 50%, LAD Prox 50%Lcx Ostial 80%, OM closed 100%, RCA Closed 100%. Patient transferred to [facility] for cardiac surgery evaluation with Dr. Hairston. At this time patient reports asymptomatic denies CP, palpitation, SOB, WINN, HA, dizziness, N/V/D, fever or chills.  No acute event noted overnight.    (14 Dec 2020 17:39)      Surgery/Hospital course:  12/17 CABG     Today:  Extubated overnight     REVIEW OF SYSTEMS:  Gen: No fever  EYES/ENT: No visual changes;  No vertigo or throat pain   NECK: No pain   RES:  No shortness of breath or Cough  Chest: + incisional pain  CARD: No chest pain   GI: No abdominal pain  : No dysuria  NEURO: No weakness  SKIN: No itching, rashes     Physical Exam:  Vital Signs Last 24 Hrs  T(C): 37 (18 Dec 2020 04:00), Max: 37.4 (18 Dec 2020 00:00)  T(F): 98.6 (18 Dec 2020 04:00), Max: 99.3 (18 Dec 2020 00:00)  HR: 76 (18 Dec 2020 07:00) (74 - 106)  BP: 138/74 (18 Dec 2020 03:00) (138/74 - 153/80)  BP(mean): 100 (18 Dec 2020 03:00) (96 - 109)  RR: 12 (18 Dec 2020 07:00) (12 - 37)  SpO2: 94% (18 Dec 2020 07:00) (92% - 100%)  Gen:  Awake, alert   CNS: non focal 	  Neck: no JVD  RES : clear , no wheezing    Chest:   + chest tubes                     CVS: Regular  rhythm. Normal S1/S2  Abd: Soft, non-distended. Bowel sounds present.  Skin: No rash.  Ext:  no edema, A Line  ============================I/O===========================   I&O's Detail    17 Dec 2020 07:01  -  18 Dec 2020 07:00  --------------------------------------------------------  IN:    Albumin 5%  - 250 mL: 1000 mL    Dexmedetomidine: 8.4 mL    Insulin: 43.5 mL    Insulin: 41.5 mL    IV PiggyBack: 550 mL    Lactated Ringers Bolus: 2000 mL    NiCARdipine: 115 mL    Norepinephrine: 4.7 mL    sodium chloride 0.9%: 180 mL  Total IN: 3943.1 mL    OUT:    Chest Tube (mL): 170 mL    Chest Tube (mL): 150 mL    Chest Tube (mL): 10 mL    dextrose 5%: 0 mL    Voided (mL): 1800 mL  Total OUT: 2130 mL    Total NET: 1813.1 mL        ============================ LABS =========================                        10.4   8.78  )-----------( 105      ( 18 Dec 2020 01:15 )             30.8     12-18    140  |  107  |  20  ----------------------------<  143<H>  4.9   |  21<L>  |  0.99    Ca    9.5      18 Dec 2020 00:14  Phos  3.1     12-18  Mg     2.4     12-18    TPro  5.9<L>  /  Alb  4.1  /  TBili  1.0  /  DBili  x   /  AST  32  /  ALT  13  /  AlkPhos  49  12-18    LIVER FUNCTIONS - ( 18 Dec 2020 00:14 )  Alb: 4.1 g/dL / Pro: 5.9 g/dL / ALK PHOS: 49 U/L / ALT: 13 U/L / AST: 32 U/L / GGT: x           PT/INR - ( 18 Dec 2020 00:14 )   PT: 13.2 sec;   INR: 1.11 ratio         PTT - ( 18 Dec 2020 00:14 )  PTT:29.1 sec  ABG - ( 18 Dec 2020 06:50 )  pH, Arterial: 7.36  pH, Blood: x     /  pCO2: 44    /  pO2: 92    / HCO3: 24    / Base Excess: -.8   /  SaO2: 97                  ======================Micro/Rad/Cardio=================  CXR: Reviewed  Echo:Reviewed  ======================================================  PAST MEDICAL & SURGICAL HISTORY:  Aneurysm of right leg    BPH (benign prostatic hyperplasia)    Osteoarthritis    Type 2 diabetes mellitus    HLD (hyperlipidemia)    HTN (hypertension)    CAD in native artery    BPH (benign prostatic hyperplasia)    OA (osteoarthritis)    DM (diabetes mellitus)    HLD (hyperlipidemia)    HTN (hypertension)    S/P appendectomy    H/O left inguinal hernia repair    S/P excision of ganglion cyst    S/P left inguinal hernia repair    S/P appendectomy      ====================ASSESSMENT ==============  CAD/ASHD S/P CABG x 3   Post op hypovolemic shock  Diabetes Mellitus Type 2  HLD (hyperlipidemia)  HTN (hypertension)     Plan:  ====================== NEUROLOGY=====================  Nonfocal, continue monitoring neuro status     ==================== RESPIRATORY======================  Extubated overnight, supplemental O2 via 50L/min HFO2   Encourage incentive spirometry, continue pulse ox monitoring, follow ABGs     ====================CARDIOVASCULAR==================  CAD s/p CABGx3   Back up pacing in place   Continue hemodynamic monitoring   Blood pressure support with Lopressor, Amlodipine, and IV Cardene   ASA/Lipitor for graft patency     aspirin enteric coated 325 milliGRAM(s) Oral daily  atorvastatin 80 milliGRAM(s) Oral at bedtime  amLODIPine   Tablet 5 milliGRAM(s) Oral daily  metoprolol tartrate 25 milliGRAM(s) Oral two times a day  niCARdipine Infusion 5 mG/Hr (25 mL/Hr) IV Continuous <Continuous>    ===================HEMATOLOGIC/ONC ===================  Monitor H&H/Plts     VTE prophylaxis, heparin   Injectable 5000 Unit(s) SubCutaneous every 8 hours    ===================== RENAL =========================  Continue monitoring urine output, I&O, BUN/Cr     ==================== GASTROINTESTINAL===================  Tolerating consistent carb diet   Bowel regimen with Miralax   Reglan for gut motility     GI prophylaxis, famotidine    Tablet 20 milliGRAM(s) Oral two times a day  polyethylene glycol 3350 17 Gram(s) Oral daily  sodium chloride 0.9%. 1000 milliLiter(s) (10 mL/Hr) IV Continuous <Continuous>  metoclopramide Injectable 10 milliGRAM(s) IV Push every 8 hours    =======================    ENDOCRINE  =====================  Hx of DM2, continue glucose control with insulin drip     insulin regular Infusion 4 Unit(s)/Hr (4 mL/Hr) IV Continuous <Continuous>    ========================INFECTIOUS DISEASE================  Perioperative coverage with cefuroxime     cefuroxime  IVPB 1500 milliGRAM(s) IV Intermittent every 8 hours      .crit    By signing my name below, I, Jessica Garcia, attest that this documentation has been prepared under the direction and in the presence of Rudolph Velasquez MD.  Electronically signed: Love Puri, 12-18-20 @ 08:35    I, Rudolph Velasquez, personally performed the services described in this documentation. all medical record entries made by the love were at my direction and in my presence. I have reviewed the chart and agree that the record reflects my personal performance and is accurate and complete  Electronically signed: Rudolph Velasquez, 12-18-20 @ 08:35       KENDALL DAWSON  [id number]  Patient is a 75y old  Male who presents with a chief complaint of Cardiac Surgery (18 Dec 2020 07:33)    HPI:  History of Present Illness:    76 yo Male poor historian with PMHx of HTN, HLD, DM2, BPH and OA who presented to [facility] today for an elective cardiac cath.  Patient was seen and evaluated by Cardiologist Dr. Horvath for routine visit and patient was fount to have symptoms of CP with WINN and SOB x 5 months. Cardiac cath finding revealed significant multivessel CAD.  Preliminary cath finding:  EF 55%; LM Distal 50%, LAD Prox 50%Lcx Ostial 80%, OM closed 100%, RCA Closed 100%. Patient transferred to [facility] for cardiac surgery evaluation with Dr. Hairston. At this time patient reports asymptomatic denies CP, palpitation, SOB, WINN, HA, dizziness, N/V/D, fever or chills.  No acute event noted overnight.    (14 Dec 2020 17:39)      Surgery/Hospital course:  12/17 CABG x3    Today:  Extubated overnight , Still with hypoxia require high-flow oxygen 70%, titrate down to 60%  Weaned off Cardene drip  Insulin drip  Out of bed to chair    REVIEW OF SYSTEMS:  Gen: No fever  EYES/ENT: No visual changes;  No vertigo or throat pain   NECK: No pain   RES:  +shortness of breath   Chest: + incisional pain  CARD: No chest pain   GI: No abdominal pain  : No dysuria  NEURO: No weakness  SKIN: No itching, rashes     Physical Exam:  Vital Signs Last 24 Hrs  T(C): 37 (18 Dec 2020 04:00), Max: 37.4 (18 Dec 2020 00:00)  T(F): 98.6 (18 Dec 2020 04:00), Max: 99.3 (18 Dec 2020 00:00)  HR: 76 (18 Dec 2020 07:00) (74 - 106)  BP: 138/74 (18 Dec 2020 03:00) (138/74 - 153/80)  BP(mean): 100 (18 Dec 2020 03:00) (96 - 109)  RR: 12 (18 Dec 2020 07:00) (12 - 37)  SpO2: 94% (18 Dec 2020 07:00) (92% - 100%)  Gen:  Awake, alert   CNS: non focal 	  Neck: no JVD  RES : clear , no wheezing    Chest:   + chest tubes                     CVS: Regular  rhythm. Normal S1/S2  Abd: Soft, non-distended. Bowel sounds present.  Skin: No rash.  Ext:  no edema, A Line  ============================I/O===========================   I&O's Detail    17 Dec 2020 07:01  -  18 Dec 2020 07:00  --------------------------------------------------------  IN:    Albumin 5%  - 250 mL: 1000 mL    Dexmedetomidine: 8.4 mL    Insulin: 43.5 mL    Insulin: 41.5 mL    IV PiggyBack: 550 mL    Lactated Ringers Bolus: 2000 mL    NiCARdipine: 115 mL    Norepinephrine: 4.7 mL    sodium chloride 0.9%: 180 mL  Total IN: 3943.1 mL    OUT:    Chest Tube (mL): 170 mL    Chest Tube (mL): 150 mL    Chest Tube (mL): 10 mL    dextrose 5%: 0 mL    Voided (mL): 1800 mL  Total OUT: 2130 mL    Total NET: 1813.1 mL        ============================ LABS =========================                        10.4   8.78  )-----------( 105      ( 18 Dec 2020 01:15 )             30.8     12-18    140  |  107  |  20  ----------------------------<  143<H>  4.9   |  21<L>  |  0.99    Ca    9.5      18 Dec 2020 00:14  Phos  3.1     12-18  Mg     2.4     12-18    TPro  5.9<L>  /  Alb  4.1  /  TBili  1.0  /  DBili  x   /  AST  32  /  ALT  13  /  AlkPhos  49  12-18    LIVER FUNCTIONS - ( 18 Dec 2020 00:14 )  Alb: 4.1 g/dL / Pro: 5.9 g/dL / ALK PHOS: 49 U/L / ALT: 13 U/L / AST: 32 U/L / GGT: x           PT/INR - ( 18 Dec 2020 00:14 )   PT: 13.2 sec;   INR: 1.11 ratio  PTT - ( 18 Dec 2020 00:14 )  PTT:29.1 sec  ABG - ( 18 Dec 2020 06:50 ) pH, Arterial: 7.36  pH, Blood: x     /  pCO2: 44    /  pO2: 92    / HCO3: 24    / Base Excess: -.8   /  SaO2: 97            ======================Micro/Rad/Cardio=================  CXR: Reviewed  Echo:Reviewed  ======================================================  PAST MEDICAL & SURGICAL HISTORY:  Aneurysm of right leg    BPH (benign prostatic hyperplasia)    Osteoarthritis    Type 2 diabetes mellitus    HLD (hyperlipidemia)    HTN (hypertension)    CAD in native artery    BPH (benign prostatic hyperplasia)    OA (osteoarthritis)    DM (diabetes mellitus)    HLD (hyperlipidemia)    HTN (hypertension)    S/P appendectomy    H/O left inguinal hernia repair    S/P excision of ganglion cyst    S/P left inguinal hernia repair    S/P appendectomy      ====================ASSESSMENT ==============  Hypoxemia  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  Hyperglycemia  Diabetes Mellitus Type 2  HLD (hyperlipidemia)  HTN (hypertension)     Plan:  ====================== NEUROLOGY=====================  Nonfocal, continue monitoring neuro status     ==================== RESPIRATORY======================  Extubated overnight, supplemental O2FiO2 70% wean down to 60% via 50L/min HFO2   Encourage incentive spirometry, continue pulse ox monitoring, follow ABGs     ====================CARDIOVASCULAR==================  CAD s/p CABGx3   Back up pacing in place   Continue hemodynamic monitoring   Blood pressure  control with Lopressor, Amlodipine, and IV Cardene   ASA/Lipitor for graft patency    wean of Cardene drip    aspirin enteric coated 325 milliGRAM(s) Oral daily  atorvastatin 80 milliGRAM(s) Oral at bedtime  amLODIPine   Tablet 5 milliGRAM(s) Oral daily  metoprolol tartrate 25 milliGRAM(s) Oral two times a day  niCARdipine Infusion 5 mG/Hr (25 mL/Hr) IV Continuous <Continuous>    ===================HEMATOLOGIC/ONC ===================  Monitor H&H/Plts     VTE prophylaxis, heparin   Injectable 5000 Unit(s) SubCutaneous every 8 hours    ===================== RENAL =========================  Continue monitoring urine output, I&O, BUN/Cr     ==================== GASTROINTESTINAL===================  Tolerating consistent carb diet   Bowel regimen with Miralax   Reglan for gut motility     GI prophylaxis, famotidine    Tablet 20 milliGRAM(s) Oral two times a day  polyethylene glycol 3350 17 Gram(s) Oral daily  sodium chloride 0.9%. 1000 milliLiter(s) (10 mL/Hr) IV Continuous <Continuous>  metoclopramide Injectable 10 milliGRAM(s) IV Push every 8 hours    =======================    ENDOCRINE  =====================  Hx of DM2, continue glucose control with insulin drip     insulin regular Infusion 4 Unit(s)/Hr (4 mL/Hr) IV Continuous <Continuous>    ========================INFECTIOUS DISEASE================  Perioperative coverage with cefuroxime     cefuroxime  IVPB 1500 milliGRAM(s) IV Intermittent every 8 hours       complex patient complex decision making    By signing my name below, I, Jessica Garcia, attest that this documentation has been prepared under the direction and in the presence of Rudolph Velasquez MD.  Electronically signed: Maria Elena Puri, 12-18-20 @ 08:35    I, Rudolph Velasquez, personally performed the services described in this documentation. all medical record entries made by the scribe were at my direction and in my presence. I have reviewed the chart and agree that the record reflects my personal performance and is accurate and complete  Electronically signed: Rudolph Velasquez, 12-18-20 @ 08:35

## 2020-12-18 NOTE — PHYSICAL THERAPY INITIAL EVALUATION ADULT - PLANNED THERAPY INTERVENTIONS, PT EVAL
1. GOAL: Pt will be able to negotiate 10 steps +HR independently with reciprocal pattern in 3 weeks./bed mobility training/gait training/transfer training

## 2020-12-19 LAB
ALBUMIN SERPL ELPH-MCNC: 3.9 G/DL — SIGNIFICANT CHANGE UP (ref 3.3–5)
ALP SERPL-CCNC: 59 U/L — SIGNIFICANT CHANGE UP (ref 40–120)
ALT FLD-CCNC: 18 U/L — SIGNIFICANT CHANGE UP (ref 10–45)
ANION GAP SERPL CALC-SCNC: 11 MMOL/L — SIGNIFICANT CHANGE UP (ref 5–17)
AST SERPL-CCNC: 27 U/L — SIGNIFICANT CHANGE UP (ref 10–40)
BASOPHILS # BLD AUTO: 0.02 K/UL — SIGNIFICANT CHANGE UP (ref 0–0.2)
BASOPHILS NFR BLD AUTO: 0.2 % — SIGNIFICANT CHANGE UP (ref 0–2)
BILIRUB SERPL-MCNC: 0.8 MG/DL — SIGNIFICANT CHANGE UP (ref 0.2–1.2)
BUN SERPL-MCNC: 31 MG/DL — HIGH (ref 7–23)
CALCIUM SERPL-MCNC: 9.1 MG/DL — SIGNIFICANT CHANGE UP (ref 8.4–10.5)
CHLORIDE SERPL-SCNC: 107 MMOL/L — SIGNIFICANT CHANGE UP (ref 96–108)
CO2 SERPL-SCNC: 24 MMOL/L — SIGNIFICANT CHANGE UP (ref 22–31)
CREAT SERPL-MCNC: 1.5 MG/DL — HIGH (ref 0.5–1.3)
EOSINOPHIL # BLD AUTO: 0.01 K/UL — SIGNIFICANT CHANGE UP (ref 0–0.5)
EOSINOPHIL NFR BLD AUTO: 0.1 % — SIGNIFICANT CHANGE UP (ref 0–6)
GAS PNL BLDA: SIGNIFICANT CHANGE UP
GLUCOSE BLDC GLUCOMTR-MCNC: 147 MG/DL — HIGH (ref 70–99)
GLUCOSE BLDC GLUCOMTR-MCNC: 71 MG/DL — SIGNIFICANT CHANGE UP (ref 70–99)
GLUCOSE SERPL-MCNC: 173 MG/DL — HIGH (ref 70–99)
HCT VFR BLD CALC: 31.5 % — LOW (ref 39–50)
HGB BLD-MCNC: 10.1 G/DL — LOW (ref 13–17)
IMM GRANULOCYTES NFR BLD AUTO: 0.3 % — SIGNIFICANT CHANGE UP (ref 0–1.5)
LYMPHOCYTES # BLD AUTO: 1.02 K/UL — SIGNIFICANT CHANGE UP (ref 1–3.3)
LYMPHOCYTES # BLD AUTO: 10.2 % — LOW (ref 13–44)
MCHC RBC-ENTMCNC: 29.5 PG — SIGNIFICANT CHANGE UP (ref 27–34)
MCHC RBC-ENTMCNC: 32.1 GM/DL — SIGNIFICANT CHANGE UP (ref 32–36)
MCV RBC AUTO: 92.1 FL — SIGNIFICANT CHANGE UP (ref 80–100)
MONOCYTES # BLD AUTO: 0.86 K/UL — SIGNIFICANT CHANGE UP (ref 0–0.9)
MONOCYTES NFR BLD AUTO: 8.6 % — SIGNIFICANT CHANGE UP (ref 2–14)
NEUTROPHILS # BLD AUTO: 8.02 K/UL — HIGH (ref 1.8–7.4)
NEUTROPHILS NFR BLD AUTO: 80.6 % — HIGH (ref 43–77)
NRBC # BLD: 0 /100 WBCS — SIGNIFICANT CHANGE UP (ref 0–0)
PLATELET # BLD AUTO: 107 K/UL — LOW (ref 150–400)
POTASSIUM SERPL-MCNC: 4.1 MMOL/L — SIGNIFICANT CHANGE UP (ref 3.5–5.3)
POTASSIUM SERPL-SCNC: 4.1 MMOL/L — SIGNIFICANT CHANGE UP (ref 3.5–5.3)
PROT SERPL-MCNC: 5.8 G/DL — LOW (ref 6–8.3)
RBC # BLD: 3.42 M/UL — LOW (ref 4.2–5.8)
RBC # FLD: 13.4 % — SIGNIFICANT CHANGE UP (ref 10.3–14.5)
SODIUM SERPL-SCNC: 142 MMOL/L — SIGNIFICANT CHANGE UP (ref 135–145)
WBC # BLD: 9.96 K/UL — SIGNIFICANT CHANGE UP (ref 3.8–10.5)
WBC # FLD AUTO: 9.96 K/UL — SIGNIFICANT CHANGE UP (ref 3.8–10.5)

## 2020-12-19 PROCEDURE — 71045 X-RAY EXAM CHEST 1 VIEW: CPT | Mod: 26,77

## 2020-12-19 PROCEDURE — 71045 X-RAY EXAM CHEST 1 VIEW: CPT | Mod: 26

## 2020-12-19 PROCEDURE — 99291 CRITICAL CARE FIRST HOUR: CPT

## 2020-12-19 PROCEDURE — 93010 ELECTROCARDIOGRAM REPORT: CPT

## 2020-12-19 RX ORDER — POTASSIUM CHLORIDE 20 MEQ
20 PACKET (EA) ORAL ONCE
Refills: 0 | Status: COMPLETED | OUTPATIENT
Start: 2020-12-19 | End: 2020-12-19

## 2020-12-19 RX ORDER — FUROSEMIDE 40 MG
40 TABLET ORAL ONCE
Refills: 0 | Status: COMPLETED | OUTPATIENT
Start: 2020-12-19 | End: 2020-12-19

## 2020-12-19 RX ORDER — IPRATROPIUM/ALBUTEROL SULFATE 18-103MCG
3 AEROSOL WITH ADAPTER (GRAM) INHALATION EVERY 8 HOURS
Refills: 0 | Status: DISCONTINUED | OUTPATIENT
Start: 2020-12-19 | End: 2020-12-20

## 2020-12-19 RX ORDER — MAGNESIUM SULFATE 500 MG/ML
2 VIAL (ML) INJECTION ONCE
Refills: 0 | Status: COMPLETED | OUTPATIENT
Start: 2020-12-19 | End: 2020-12-19

## 2020-12-19 RX ORDER — ALBUMIN HUMAN 25 %
250 VIAL (ML) INTRAVENOUS ONCE
Refills: 0 | Status: COMPLETED | OUTPATIENT
Start: 2020-12-19 | End: 2020-12-19

## 2020-12-19 RX ORDER — POTASSIUM CHLORIDE 20 MEQ
20 PACKET (EA) ORAL
Refills: 0 | Status: COMPLETED | OUTPATIENT
Start: 2020-12-19 | End: 2020-12-19

## 2020-12-19 RX ORDER — METOPROLOL TARTRATE 50 MG
50 TABLET ORAL EVERY 8 HOURS
Refills: 0 | Status: DISCONTINUED | OUTPATIENT
Start: 2020-12-19 | End: 2020-12-22

## 2020-12-19 RX ADMIN — Medication 1: at 11:51

## 2020-12-19 RX ADMIN — Medication 3 MILLILITER(S): at 22:21

## 2020-12-19 RX ADMIN — TAMSULOSIN HYDROCHLORIDE 0.4 MILLIGRAM(S): 0.4 CAPSULE ORAL at 21:00

## 2020-12-19 RX ADMIN — Medication 7 UNIT(S): at 08:14

## 2020-12-19 RX ADMIN — Medication 25 MILLIGRAM(S): at 13:42

## 2020-12-19 RX ADMIN — Medication 20 MILLIEQUIVALENT(S): at 13:49

## 2020-12-19 RX ADMIN — ATORVASTATIN CALCIUM 80 MILLIGRAM(S): 80 TABLET, FILM COATED ORAL at 21:00

## 2020-12-19 RX ADMIN — Medication 40 MILLIGRAM(S): at 21:03

## 2020-12-19 RX ADMIN — HEPARIN SODIUM 5000 UNIT(S): 5000 INJECTION INTRAVENOUS; SUBCUTANEOUS at 05:13

## 2020-12-19 RX ADMIN — OXYCODONE AND ACETAMINOPHEN 1 TABLET(S): 5; 325 TABLET ORAL at 13:40

## 2020-12-19 RX ADMIN — Medication 10 MILLIGRAM(S): at 05:13

## 2020-12-19 RX ADMIN — CHLORHEXIDINE GLUCONATE 1 APPLICATION(S): 213 SOLUTION TOPICAL at 05:07

## 2020-12-19 RX ADMIN — Medication 125 MILLILITER(S): at 11:16

## 2020-12-19 RX ADMIN — POLYETHYLENE GLYCOL 3350 17 GRAM(S): 17 POWDER, FOR SOLUTION ORAL at 11:17

## 2020-12-19 RX ADMIN — Medication 40 MILLIEQUIVALENT(S): at 22:00

## 2020-12-19 RX ADMIN — HEPARIN SODIUM 5000 UNIT(S): 5000 INJECTION INTRAVENOUS; SUBCUTANEOUS at 21:00

## 2020-12-19 RX ADMIN — Medication 20 MILLIGRAM(S): at 05:13

## 2020-12-19 RX ADMIN — Medication 20 MILLIEQUIVALENT(S): at 11:54

## 2020-12-19 RX ADMIN — OXYCODONE AND ACETAMINOPHEN 1 TABLET(S): 5; 325 TABLET ORAL at 21:50

## 2020-12-19 RX ADMIN — Medication 100 MILLIGRAM(S): at 00:54

## 2020-12-19 RX ADMIN — Medication 7 UNIT(S): at 11:51

## 2020-12-19 RX ADMIN — Medication 25 MILLIGRAM(S): at 05:14

## 2020-12-19 RX ADMIN — Medication 3 MILLILITER(S): at 13:02

## 2020-12-19 RX ADMIN — FAMOTIDINE 20 MILLIGRAM(S): 10 INJECTION INTRAVENOUS at 05:14

## 2020-12-19 RX ADMIN — Medication 50 MILLIGRAM(S): at 21:02

## 2020-12-19 RX ADMIN — Medication 50 GRAM(S): at 19:24

## 2020-12-19 RX ADMIN — INSULIN GLARGINE 25 UNIT(S): 100 INJECTION, SOLUTION SUBCUTANEOUS at 22:27

## 2020-12-19 RX ADMIN — Medication 20 MILLIEQUIVALENT(S): at 21:00

## 2020-12-19 RX ADMIN — HEPARIN SODIUM 5000 UNIT(S): 5000 INJECTION INTRAVENOUS; SUBCUTANEOUS at 13:42

## 2020-12-19 RX ADMIN — OXYCODONE AND ACETAMINOPHEN 1 TABLET(S): 5; 325 TABLET ORAL at 21:20

## 2020-12-19 RX ADMIN — OXYCODONE AND ACETAMINOPHEN 1 TABLET(S): 5; 325 TABLET ORAL at 14:10

## 2020-12-19 RX ADMIN — FAMOTIDINE 20 MILLIGRAM(S): 10 INJECTION INTRAVENOUS at 17:05

## 2020-12-19 RX ADMIN — Medication 325 MILLIGRAM(S): at 11:17

## 2020-12-19 NOTE — PROGRESS NOTE ADULT - SUBJECTIVE AND OBJECTIVE BOX
Chief complaint  Patient is a 75y old  Male who presents with a chief complaint of Cardiac Surgery (19 Dec 2020 08:36)   Review of systems  Patient in bed, appears comfortable.    Labs and Fingersticks  CAPILLARY BLOOD GLUCOSE      POCT Blood Glucose.: 71 mg/dL (19 Dec 2020 16:30)  POCT Blood Glucose.: 147 mg/dL (19 Dec 2020 07:38)      Anion Gap, Serum: 11 (12-19 @ 00:29)  Anion Gap, Serum: 12 (12-18 @ 00:14)      Calcium, Total Serum: 9.1 (12-19 @ 00:29)  Calcium, Total Serum: 9.5 (12-18 @ 00:14)  Albumin, Serum: 3.9 (12-19 @ 00:29)  Albumin, Serum: 4.1 (12-18 @ 00:14)    Alanine Aminotransferase (ALT/SGPT): 18 (12-19 @ 00:29)  Alanine Aminotransferase (ALT/SGPT): 13 (12-18 @ 00:14)  Alkaline Phosphatase, Serum: 59 (12-19 @ 00:29)  Alkaline Phosphatase, Serum: 49 (12-18 @ 00:14)  Aspartate Aminotransferase (AST/SGOT): 27 (12-19 @ 00:29)  Aspartate Aminotransferase (AST/SGOT): 32 (12-18 @ 00:14)        12-19    142  |  107  |  31<H>  ----------------------------<  173<H>  4.1   |  24  |  1.50<H>    Ca    9.1      19 Dec 2020 00:29  Phos  3.1     12-18  Mg     2.4     12-18    TPro  5.8<L>  /  Alb  3.9  /  TBili  0.8  /  DBili  x   /  AST  27  /  ALT  18  /  AlkPhos  59  12-19                        10.1   9.96  )-----------( 107      ( 19 Dec 2020 00:29 )             31.5     Medications  MEDICATIONS  (STANDING):  albuterol/ipratropium for Nebulization 3 milliLiter(s) Nebulizer every 8 hours  amLODIPine   Tablet 10 milliGRAM(s) Oral daily  aspirin enteric coated 325 milliGRAM(s) Oral daily  atorvastatin 80 milliGRAM(s) Oral at bedtime  chlorhexidine 2% Cloths 1 Application(s) Topical <User Schedule>  dextrose 40% Gel 15 Gram(s) Oral once  dextrose 50% Injectable 25 Gram(s) IV Push once  dextrose 50% Injectable 12.5 Gram(s) IV Push once  dextrose 50% Injectable 25 Gram(s) IV Push once  famotidine    Tablet 20 milliGRAM(s) Oral two times a day  furosemide   Injectable 20 milliGRAM(s) IV Push daily  glucagon  Injectable 1 milliGRAM(s) IntraMuscular once  heparin   Injectable 5000 Unit(s) SubCutaneous every 8 hours  insulin glargine Injectable (LANTUS) 25 Unit(s) SubCutaneous at bedtime  insulin lispro (ADMELOG) corrective regimen sliding scale   SubCutaneous three times a day before meals  insulin lispro (ADMELOG) corrective regimen sliding scale   SubCutaneous at bedtime  insulin lispro Injectable (ADMELOG) 7 Unit(s) SubCutaneous three times a day before meals  insulin regular Infusion 4 Unit(s)/Hr (4 mL/Hr) IV Continuous <Continuous>  magnesium sulfate  IVPB 2 Gram(s) IV Intermittent once  metoprolol tartrate 25 milliGRAM(s) Oral every 8 hours  niCARdipine Infusion 5 mG/Hr (25 mL/Hr) IV Continuous <Continuous>  polyethylene glycol 3350 17 Gram(s) Oral daily  sodium chloride 0.9%. 1000 milliLiter(s) (10 mL/Hr) IV Continuous <Continuous>  tamsulosin 0.4 milliGRAM(s) Oral at bedtime      Physical Exam  General: Patient comfortable in bed  Vital Signs Last 12 Hrs  T(F): 97.7 (12-19-20 @ 16:00), Max: 98.3 (12-19-20 @ 12:00)  HR: 89 (12-19-20 @ 18:00) (75 - 96)  BP: --  BP(mean): --  RR: 18 (12-19-20 @ 17:00) (10 - 32)  SpO2: 95% (12-19-20 @ 18:00) (91% - 97%)  Neck: No palpable thyroid nodules.  CVS: S1S2, No murmurs  Respiratory: No wheezing, no crepitations  GI: Abdomen soft, bowel sounds positive  Musculoskeletal:  edema lower extremities.     Diagnostics

## 2020-12-19 NOTE — PROGRESS NOTE ADULT - ASSESSMENT
Assessment  DMT2: 75y Male with DM T2 with hyperglycemia admitted with chest pain, s/p CABG, on IV insulin, extubated, eating meals, switched to basal bolus insulin, blood sugars improving, no hypoglycemic episode.  Patient is high risk with high level decision making due to uncontrolled diabetes, has increased risk for complications.  CAD: S/P CABG, on medications, monitored, stable.  HLD: On statin.          Alfa May MD  Cell: [phone]  Office: [phone]

## 2020-12-19 NOTE — PROVIDER CONTACT NOTE (CHANGE IN STATUS NOTIFICATION) - ASSESSMENT
Pulse is positive with doppler. Capillary refill is brisk. Patient is able to move and squeeze hand.

## 2020-12-19 NOTE — PROGRESS NOTE ADULT - SUBJECTIVE AND OBJECTIVE BOX
KENDALL DAWSON  MRN-[id number]  Patient is a 75y old  Male who presents with a chief complaint of Cardiac Surgery (18 Dec 2020 18:14)    HPI:  74 yo Male poor historian with PMHx of HTN, HLD, DM2, BPH and OA who presented to [facility] today for an elective cardiac cath.  Patient was seen and evaluated by Cardiologist Dr. Horvath for routine visit and patient was fount to have symptoms of CP with WINN and SOB x 5 months. Cardiac cath finding revealed significant multivessel CAD.  Preliminary cath finding:  EF 55%; LM Distal 50%, LAD Prox 50%Lcx Ostial 80%, OM closed 100%, RCA Closed 100%. Patient transferred to [facility] for cardiac surgery evaluation with Dr. Hairston. At this time patient reports asymptomatic denies CP, palpitation, SOB, WINN, HA, dizziness, N/V/D, fever or chills.  No acute event noted overnight.    (14 Dec 2020 17:39)      Surgery/Hospital course:  12/17 CABG x3  12/18 Extubated    Today:    REVIEW OF SYSTEMS:  Gen: No fever  EYES/ENT: No visual changes;  No vertigo or throat pain   NECK: No pain   RES:  + shortness of breath  Chest: + incisional pain  CARD: No chest pain   GI: No abdominal pain  : No dysuria  NEURO: No weakness  SKIN: No itching, rashes     Physical Exam:  Vital Signs Last 24 Hrs  T(C): 36.7 (19 Dec 2020 04:00), Max: 37.5 (18 Dec 2020 12:00)  T(F): 98.1 (19 Dec 2020 04:00), Max: 99.5 (18 Dec 2020 12:00)  HR: 83 (19 Dec 2020 07:00) (74 - 90)  BP: 162/72 (18 Dec 2020 20:45) (162/72 - 162/72)  BP(mean): 104 (18 Dec 2020 20:45) (104 - 104)  RR: 10 (19 Dec 2020 07:00) (8 - 37)  SpO2: 94% (19 Dec 2020 07:00) (86% - 98%)  Gen:  Awake, alert   CNS: non focal 	  Neck: no JVD  RES : clear , no wheezing    Chest:   + chest tubes                     CVS: Regular  rhythm. Normal S1/S2  Abd: Soft, non-distended. Bowel sounds present.  Skin: No rash.  Ext:  no edema, A Line  PSY:  ============================I/O===========================   I&O's Detail    18 Dec 2020 07:01  -  19 Dec 2020 07:00  --------------------------------------------------------  IN:    Insulin: 29.5 mL    IV PiggyBack: 250 mL    NiCARdipine: 50 mL    sodium chloride 0.9%: 210 mL  Total IN: 539.5 mL    OUT:    Chest Tube (mL): 50 mL    Chest Tube (mL): 145 mL    Chest Tube (mL): 85 mL    Indwelling Catheter - Urethral (mL): 1750 mL  Total OUT: 2030 mL    Total NET: -1490.5 mL        ============================ LABS =========================                        10.1   9.96  )-----------( 107      ( 19 Dec 2020 00:29 )             31.5     12-19    142  |  107  |  31<H>  ----------------------------<  173<H>  4.1   |  24  |  1.50<H>    Ca    9.1      19 Dec 2020 00:29  Phos  3.1     12-18  Mg     2.4     12-18    TPro  5.8<L>  /  Alb  3.9  /  TBili  0.8  /  DBili  x   /  AST  27  /  ALT  18  /  AlkPhos  59  12-19    LIVER FUNCTIONS - ( 19 Dec 2020 00:29 )  Alb: 3.9 g/dL / Pro: 5.8 g/dL / ALK PHOS: 59 U/L / ALT: 18 U/L / AST: 27 U/L / GGT: x           PT/INR - ( 18 Dec 2020 00:14 )   PT: 13.2 sec;   INR: 1.11 ratio         PTT - ( 18 Dec 2020 00:14 )  PTT:29.1 sec  ABG - ( 19 Dec 2020 05:38 )  pH, Arterial: 7.38  pH, Blood: x     /  pCO2: 44    /  pO2: 67    / HCO3: 25    / Base Excess: .6    /  SaO2: 92                  ======================Micro/Rad/Cardio=================  CXR: Reviewed  Echo: Reviewed  ======================================================  PAST MEDICAL & SURGICAL HISTORY:  Aneurysm of right leg    BPH (benign prostatic hyperplasia)    Osteoarthritis    Type 2 diabetes mellitus    HLD (hyperlipidemia)    HTN (hypertension)    CAD in native artery    BPH (benign prostatic hyperplasia)    OA (osteoarthritis)    DM (diabetes mellitus)    HLD (hyperlipidemia)    HTN (hypertension)    S/P appendectomy    H/O left inguinal hernia repair    S/P excision of ganglion cyst    S/P left inguinal hernia repair    S/P appendectomy      ====================ASSESSMENT ==============  Hypoxemia  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  Hyperglycemia  Diabetes Mellitus Type 2  HLD (hyperlipidemia)  HTN (hypertension)  BPH  Thrombocytopenia    Plan:  ====================== NEUROLOGY=====================  Nonfocal, continue with assessment of status as per ICU protocol  Oxycodone/Tylenol prn for analgesia    oxycodone    5 mG/acetaminophen 325 mG 1 Tablet(s) Oral every 6 hours PRN Moderate Pain (4 - 6)  oxycodone    5 mG/acetaminophen 325 mG 2 Tablet(s) Oral every 6 hours PRN Severe Pain (7 - 10)    ==================== RESPIRATORY======================  Extubated, on supplemental O2 via 80%50L/min high flow nasal cannula  Monitor RR, SpO2 via pulse oximetry, ABGs  Continue duonebs    albuterol/ipratropium for Nebulization 3 milliLiter(s) Nebulizer every 8 hours    ====================CARDIOVASCULAR==================  CAD s/p CABGx3   Back up pacing in place, box off  Amlodipine, Metoprolol and IV Nicardipine for blood pressure control  ASA/Statin for graft patency, hx of CAD  Monitor hemodynamics    amLODIPine   Tablet 10 milliGRAM(s) Oral daily  aspirin enteric coated 325 milliGRAM(s) Oral daily  atorvastatin 80 milliGRAM(s) Oral at bedtime  metoprolol tartrate 25 milliGRAM(s) Oral every 8 hours  niCARdipine Infusion 5 mG/Hr (25 mL/Hr) IV Continuous <Continuous>    ===================HEMATOLOGIC/ONC ===================  H/H 10.1/31.5  Thrombocytopenia, platelet count at 107k  Continue close monitoring of hemoglobin, hematocrit and platelet levels  Anticoagulation therapy with SQ Heparin 5000U for VTE prophylaxis    heparin   Injectable 5000 Unit(s) SubCutaneous every 8 hours    ===================== RENAL =========================  Monitor electrolytes, I/Os, BUN/Creatinine and urine output  Continue diuresis with IV Lasix   Flomax for BPH    furosemide   Injectable 20 milliGRAM(s) IV Push daily  tamsulosin 0.4 milliGRAM(s) Oral at bedtime    ==================== GASTROINTESTINAL===================  Tolerating consistent carb diet   Continue with Miralax for bowel regimen  Pepcid for stress ulcer prophylaxis    famotidine    Tablet 20 milliGRAM(s) Oral two times a day  polyethylene glycol 3350 17 Gram(s) Oral daily  sodium chloride 0.9%. 1000 milliLiter(s) (10 mL/Hr) IV Continuous <Continuous>    =======================    ENDOCRINE  =====================  History of type 2 diabetes mellitus, requiring glycemic control with IV Insulin infusion, Lispro sliding scale, Lantus   Continue monitoring blood glucose levels    dextrose 40% Gel 15 Gram(s) Oral once  dextrose 50% Injectable 25 Gram(s) IV Push once  dextrose 50% Injectable 12.5 Gram(s) IV Push once  dextrose 50% Injectable 25 Gram(s) IV Push once  glucagon  Injectable 1 milliGRAM(s) IntraMuscular once  insulin glargine Injectable (LANTUS) 25 Unit(s) SubCutaneous at bedtime  insulin lispro (ADMELOG) corrective regimen sliding scale   SubCutaneous three times a day before meals  insulin lispro (ADMELOG) corrective regimen sliding scale   SubCutaneous at bedtime  insulin lispro Injectable (ADMELOG) 7 Unit(s) SubCutaneous three times a day before meals  insulin regular Infusion 4 Unit(s)/Hr (4 mL/Hr) IV Continuous <Continuous>    ========================INFECTIOUS DISEASE================  Temp 98.1F, WBC within normal limits at 9.96  Continue to monitor for fever and for leukocytosis      Patient requires continuous monitoring with bedside rhythm monitoring, arterial line, pulse oximetry, and intermittent blood gas analysis. Care plan discussed with ICU care team. Patient remains critical; required more than usual ICU care.    By signing my name below, I, Lalit Schneider, attest that this documentation has been prepared under the direction and in the presence of Rudolph Velasquez MD.  Electronically signed: Love Moncada, 12-19-20 @ 08:36    I, Rudolph Velasquez, personally performed the services described in this documentation. All medical record entries made by the love were at my direction and in my presence. I have reviewed the chart and agree that the record reflects my personal performance and is accurate and complete  Electronically signed: Rudolph Velasquez, 12-19-20 @ 08:36       KENDALL DAWSON  MRN-[id number]  Patient is a 75y old  Male who presents with a chief complaint of Cardiac Surgery (18 Dec 2020 18:14)    HPI:  76 yo Male poor historian with PMHx of HTN, HLD, DM2, BPH and OA who presented to [facility] today for an elective cardiac cath.  Patient was seen and evaluated by Cardiologist Dr. Horvath for routine visit and patient was fount to have symptoms of CP with WINN and SOB x 5 months. Cardiac cath finding revealed significant multivessel CAD.  Preliminary cath finding:  EF 55%; LM Distal 50%, LAD Prox 50%Lcx Ostial 80%, OM closed 100%, RCA Closed 100%. Patient transferred to [facility] for cardiac surgery evaluation with Dr. Hairston. At this time patient reports asymptomatic denies CP, palpitation, SOB, WINN, HA, dizziness, N/V/D, fever or chills.  No acute event noted overnight.    (14 Dec 2020 17:39)      Surgery/Hospital course:  12/17 CABG x3      Today:   remain on high-flow oxygen, FiO2 80%  Minimal  incisional pain      REVIEW OF SYSTEMS:  Gen: No fever  EYES/ENT: No visual changes;  No vertigo or throat pain   NECK: No pain   RES:  + shortness of breath  Chest: + incisional pain  CARD: No chest pain   GI: No abdominal pain  : No dysuria  NEURO: No weakness  SKIN: No itching, rashes     Physical Exam:  Vital Signs Last 24 Hrs  T(C): 36.7 (19 Dec 2020 04:00), Max: 37.5 (18 Dec 2020 12:00)  T(F): 98.1 (19 Dec 2020 04:00), Max: 99.5 (18 Dec 2020 12:00)  HR: 83 (19 Dec 2020 07:00) (74 - 90)  BP: 162/72 (18 Dec 2020 20:45) (162/72 - 162/72)  BP(mean): 104 (18 Dec 2020 20:45) (104 - 104)  RR: 10 (19 Dec 2020 07:00) (8 - 37)  SpO2: 94% (19 Dec 2020 07:00) (86% - 98%)  Gen:  Awake, alert   CNS: non focal 	  Neck: no JVD  RES : clear , no wheezing    Chest:   + chest tubes                     CVS: Regular  rhythm. Normal S1/S2  Abd: Soft, non-distended. Bowel sounds present.  Skin: No rash.  Ext:  no edema, A Line  PSY:  ============================I/O===========================   I&O's Detail    18 Dec 2020 07:01  -  19 Dec 2020 07:00  --------------------------------------------------------  IN:    Insulin: 29.5 mL    IV PiggyBack: 250 mL    NiCARdipine: 50 mL    sodium chloride 0.9%: 210 mL  Total IN: 539.5 mL    OUT:    Chest Tube (mL): 50 mL    Chest Tube (mL): 145 mL    Chest Tube (mL): 85 mL    Indwelling Catheter - Urethral (mL): 1750 mL  Total OUT: 2030 mL    Total NET: -1490.5 mL        ============================ LABS =========================                        10.1   9.96  )-----------( 107      ( 19 Dec 2020 00:29 )             31.5     12-19    142  |  107  |  31<H>  ----------------------------<  173<H>  4.1   |  24  |  1.50<H>    Ca    9.1      19 Dec 2020 00:29  Phos  3.1     12-18  Mg     2.4     12-18    TPro  5.8<L>  /  Alb  3.9  /  TBili  0.8  /  DBili  x   /  AST  27  /  ALT  18  /  AlkPhos  59  12-19    LIVER FUNCTIONS - ( 19 Dec 2020 00:29 )  Alb: 3.9 g/dL / Pro: 5.8 g/dL / ALK PHOS: 59 U/L / ALT: 18 U/L / AST: 27 U/L / GGT: x           PT/INR - ( 18 Dec 2020 00:14 )   PT: 13.2 sec;   INR: 1.11 ratio         PTT - ( 18 Dec 2020 00:14 )  PTT:29.1 sec  ABG - ( 19 Dec 2020 05:38 )  pH, Arterial: 7.38  pH, Blood: x     /  pCO2: 44    /  pO2: 67    / HCO3: 25    / Base Excess: .6    /  SaO2: 92                  ======================Micro/Rad/Cardio=================  CXR: Reviewed  Echo: Reviewed  ======================================================  PAST MEDICAL & SURGICAL HISTORY:  Aneurysm of right leg    BPH (benign prostatic hyperplasia)    Osteoarthritis    Type 2 diabetes mellitus    HLD (hyperlipidemia)    HTN (hypertension)    CAD in native artery    BPH (benign prostatic hyperplasia)    OA (osteoarthritis)    DM (diabetes mellitus)    HLD (hyperlipidemia)    HTN (hypertension)    S/P appendectomy    H/O left inguinal hernia repair    S/P excision of ganglion cyst    S/P left inguinal hernia repair    S/P appendectomy      ====================ASSESSMENT ==============  Hypoxemia  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  Hyperglycemia  Diabetes Mellitus Type 2  HLD (hyperlipidemia)  HTN (hypertension)  BPH  Thrombocytopenia    Plan:  ====================== NEUROLOGY=====================  Nonfocal, continue with assessment of status as per ICU protocol  Oxycodone/Tylenol prn for analgesia    oxycodone    5 mG/acetaminophen 325 mG 1 Tablet(s) Oral every 6 hours PRN Moderate Pain (4 - 6)  oxycodone    5 mG/acetaminophen 325 mG 2 Tablet(s) Oral every 6 hours PRN Severe Pain (7 - 10)    ==================== RESPIRATORY======================  Extubated, on supplemental O2 via 80%50L/min high flow nasal cannula  Monitor RR, SpO2 via pulse oximetry, ABGs  Continue duonebs    albuterol/ipratropium for Nebulization 3 milliLiter(s) Nebulizer every 8 hours    ====================CARDIOVASCULAR==================  CAD s/p CABGx3   Back up pacing in place, box off  Amlodipine, Metoprolol and IV Nicardipine for blood pressure control  ASA/Statin for graft patency, hx of CAD  Monitor hemodynamics    amLODIPine   Tablet 10 milliGRAM(s) Oral daily  aspirin enteric coated 325 milliGRAM(s) Oral daily  atorvastatin 80 milliGRAM(s) Oral at bedtime  metoprolol tartrate 25 milliGRAM(s) Oral every 8 hours  niCARdipine Infusion 5 mG/Hr (25 mL/Hr) IV Continuous <Continuous>    ===================HEMATOLOGIC/ONC ===================  H/H 10.1/31.5  Thrombocytopenia, platelet count at 107k  Continue close monitoring of hemoglobin, hematocrit and platelet levels  Anticoagulation therapy with SQ Heparin 5000U for VTE prophylaxis    heparin   Injectable 5000 Unit(s) SubCutaneous every 8 hours    ===================== RENAL =========================  Monitor electrolytes, I/Os, BUN/Creatinine and urine output  Continue diuresis with IV Lasix   Flomax for BPH    furosemide   Injectable 20 milliGRAM(s) IV Push daily  tamsulosin 0.4 milliGRAM(s) Oral at bedtime    ==================== GASTROINTESTINAL===================  Tolerating consistent carb diet   Continue with Miralax for bowel regimen  Pepcid for stress ulcer prophylaxis    famotidine    Tablet 20 milliGRAM(s) Oral two times a day  polyethylene glycol 3350 17 Gram(s) Oral daily  sodium chloride 0.9%. 1000 milliLiter(s) (10 mL/Hr) IV Continuous <Continuous>    =======================    ENDOCRINE  =====================  History of type 2 diabetes mellitus, requiring glycemic control with IV Insulin infusion, Lispro sliding scale, Lantus   Continue monitoring blood glucose levels    dextrose 40% Gel 15 Gram(s) Oral once  dextrose 50% Injectable 25 Gram(s) IV Push once  dextrose 50% Injectable 12.5 Gram(s) IV Push once  dextrose 50% Injectable 25 Gram(s) IV Push once  glucagon  Injectable 1 milliGRAM(s) IntraMuscular once  insulin glargine Injectable (LANTUS) 25 Unit(s) SubCutaneous at bedtime  insulin lispro (ADMELOG) corrective regimen sliding scale   SubCutaneous three times a day before meals  insulin lispro (ADMELOG) corrective regimen sliding scale   SubCutaneous at bedtime  insulin lispro Injectable (ADMELOG) 7 Unit(s) SubCutaneous three times a day before meals  insulin regular Infusion 4 Unit(s)/Hr (4 mL/Hr) IV Continuous <Continuous>    ========================INFECTIOUS DISEASE================  Temp 98.1F, WBC within normal limits at 9.96  Continue to monitor for fever and for leukocytosis      Patient requires continuous monitoring with bedside rhythm monitoring, arterial line, pulse oximetry, and intermittent blood gas analysis. Care plan discussed with ICU care team. Patient remains critical; required more than usual ICU care.    By signing my name below, I, Lalit Schneider, attest that this documentation has been prepared under the direction and in the presence of Rudolph Velasquez MD.  Electronically signed: Love Moncada, 12-19-20 @ 08:36    I, Rudolph Velasquez, personally performed the services described in this documentation. All medical record entries made by the love were at my direction and in my presence. I have reviewed the chart and agree that the record reflects my personal performance and is accurate and complete  Electronically signed: Rudolph Velasquez, 12-19-20 @ 08:36       KENDALL DAWSON  MRN-[id number]  Patient is a 75y old  Male who presents with a chief complaint of Cardiac Surgery (18 Dec 2020 18:14)    HPI:  74 yo Male poor historian with PMHx of HTN, HLD, DM2, BPH and OA who presented to [facility] today for an elective cardiac cath.  Patient was seen and evaluated by Cardiologist Dr. Horvath for routine visit and patient was fount to have symptoms of CP with WINN and SOB x 5 months. Cardiac cath finding revealed significant multivessel CAD.  Preliminary cath finding:  EF 55%; LM Distal 50%, LAD Prox 50%Lcx Ostial 80%, OM closed 100%, RCA Closed 100%. Patient transferred to [facility] for cardiac surgery evaluation with Dr. Hairston. At this time patient reports asymptomatic denies CP, palpitation, SOB, WINN, HA, dizziness, N/V/D, fever or chills.  No acute event noted overnight.    (14 Dec 2020 17:39)      Surgery/Hospital course:  12/17 CABG x3      Today:   postop hypoxemic respiratory failure, remain on high-flow oxygen, FiO2 80%  Minimal  incisional pain      REVIEW OF SYSTEMS:  Gen: No fever  EYES/ENT: No visual changes;  No vertigo or throat pain   NECK: No pain   RES:  + shortness of breath  Chest: + incisional pain  CARD: No chest pain   GI: No abdominal pain  : No dysuria  NEURO: No weakness  SKIN: No itching, rashes     Physical Exam:  Vital Signs Last 24 Hrs  T(C): 36.7 (19 Dec 2020 04:00), Max: 37.5 (18 Dec 2020 12:00)  T(F): 98.1 (19 Dec 2020 04:00), Max: 99.5 (18 Dec 2020 12:00)  HR: 83 (19 Dec 2020 07:00) (74 - 90)  BP: 162/72 (18 Dec 2020 20:45) (162/72 - 162/72)  BP(mean): 104 (18 Dec 2020 20:45) (104 - 104)  RR: 10 (19 Dec 2020 07:00) (8 - 37)  SpO2: 94% (19 Dec 2020 07:00) (86% - 98%)  Gen:  Awake, alert   CNS: non focal 	  Neck: no JVD  RES : clear , no wheezing    Chest:   + chest tubes                     CVS: Regular  rhythm. Normal S1/S2  Abd: Soft, non-distended. Bowel sounds present.  Skin: No rash.  Ext:  no edema, A Line  PSY:  ============================I/O===========================   I&O's Detail    18 Dec 2020 07:01  -  19 Dec 2020 07:00  --------------------------------------------------------  IN:    Insulin: 29.5 mL    IV PiggyBack: 250 mL    NiCARdipine: 50 mL    sodium chloride 0.9%: 210 mL  Total IN: 539.5 mL    OUT:    Chest Tube (mL): 50 mL    Chest Tube (mL): 145 mL    Chest Tube (mL): 85 mL    Indwelling Catheter - Urethral (mL): 1750 mL  Total OUT: 2030 mL    Total NET: -1490.5 mL        ============================ LABS =========================                        10.1   9.96  )-----------( 107      ( 19 Dec 2020 00:29 )             31.5     12-19    142  |  107  |  31<H>  ----------------------------<  173<H>  4.1   |  24  |  1.50<H>    Ca    9.1      19 Dec 2020 00:29  Phos  3.1     12-18  Mg     2.4     12-18    TPro  5.8<L>  /  Alb  3.9  /  TBili  0.8  /  DBili  x   /  AST  27  /  ALT  18  /  AlkPhos  59  12-19    LIVER FUNCTIONS - ( 19 Dec 2020 00:29 )  Alb: 3.9 g/dL / Pro: 5.8 g/dL / ALK PHOS: 59 U/L / ALT: 18 U/L / AST: 27 U/L / GGT: x           PT/INR - ( 18 Dec 2020 00:14 )   PT: 13.2 sec;   INR: 1.11 ratio         PTT - ( 18 Dec 2020 00:14 )  PTT:29.1 sec  ABG - ( 19 Dec 2020 05:38 )  pH, Arterial: 7.38  pH, Blood: x     /  pCO2: 44    /  pO2: 67    / HCO3: 25    / Base Excess: .6    /  SaO2: 92                  ======================Micro/Rad/Cardio=================  CXR: Reviewed  Echo: Reviewed  ======================================================  PAST MEDICAL & SURGICAL HISTORY:  Aneurysm of right leg    BPH (benign prostatic hyperplasia)    Osteoarthritis    Type 2 diabetes mellitus    HLD (hyperlipidemia)    HTN (hypertension)    CAD in native artery    BPH (benign prostatic hyperplasia)    OA (osteoarthritis)    DM (diabetes mellitus)    HLD (hyperlipidemia)    HTN (hypertension)    S/P appendectomy    H/O left inguinal hernia repair    S/P excision of ganglion cyst    S/P left inguinal hernia repair    S/P appendectomy      ====================ASSESSMENT ==============  Hypoxemia  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  Hyperglycemia  Diabetes Mellitus Type 2  HLD (hyperlipidemia)  HTN (hypertension)  BPH  Thrombocytopenia    Plan:  ====================== NEUROLOGY=====================   analgesics  Oxycodone/Tylenol prn for analgesia    oxycodone    5 mG/acetaminophen 325 mG 1 Tablet(s) Oral every 6 hours PRN Moderate Pain (4 - 6)  oxycodone    5 mG/acetaminophen 325 mG 2 Tablet(s) Oral every 6 hours PRN Severe Pain (7 - 10)    ==================== RESPIRATORY======================   hypoxemic respiratory failure  on supplemental O2 via 80%50L/min high flow nasal cannula  Monitor RR, SpO2 via pulse oximetry, ABGs  Continue  bronchodilators    albuterol/ipratropium for Nebulization 3 milliLiter(s) Nebulizer every 8 hours    ====================CARDIOVASCULAR==================  CAD s/p CABGx3   Back up pacing in place, box off  Amlodipine, Metoprolol   ASA/Statin for graft patency, hx of CAD  Monitor hemodynamics    amLODIPine   Tablet 10 milliGRAM(s) Oral daily  aspirin enteric coated 325 milliGRAM(s) Oral daily  atorvastatin 80 milliGRAM(s) Oral at bedtime  metoprolol tartrate 25 milliGRAM(s) Oral every 8 hours  niCARdipine Infusion 5 mG/Hr (25 mL/Hr) IV Continuous <Continuous>    ===================HEMATOLOGIC/ONC ===================  H/H 10.1/31.5  Thrombocytopenia, platelet count at 107k  Continue close monitoring of hemoglobin, hematocrit and platelet levels   DVT(Deep Venous Thrombosis) prophylaxis    heparin   Injectable 5000 Unit(s) SubCutaneous every 8 hours    ===================== RENAL =========================  Monitor electrolytes, I/Os, BUN/Creatinine and urine output  Continue diuresis with IV Lasix   Flomax for BPH    furosemide   Injectable 20 milliGRAM(s) IV Push daily  tamsulosin 0.4 milliGRAM(s) Oral at bedtime    ==================== GASTROINTESTINAL===================  Tolerating consistent carb diet   Continue with Miralax for bowel regimen  Pepcid for stress ulcer prophylaxis    famotidine    Tablet 20 milliGRAM(s) Oral two times a day  polyethylene glycol 3350 17 Gram(s) Oral daily      =======================    ENDOCRINE  =====================  History of type 2 diabetes mellitus, requiring glycemic control with IV Insulin infusion, Lispro sliding scale, Lantus   Continue monitoring blood glucose levels    dextrose 40% Gel 15 Gram(s) Oral once  dextrose 50% Injectable 25 Gram(s) IV Push once  dextrose 50% Injectable 12.5 Gram(s) IV Push once  dextrose 50% Injectable 25 Gram(s) IV Push once  glucagon  Injectable 1 milliGRAM(s) IntraMuscular once  insulin glargine Injectable (LANTUS) 25 Unit(s) SubCutaneous at bedtime  insulin lispro (ADMELOG) corrective regimen sliding scale   SubCutaneous three times a day before meals  insulin lispro (ADMELOG) corrective regimen sliding scale   SubCutaneous at bedtime  insulin lispro Injectable (ADMELOG) 7 Unit(s) SubCutaneous three times a day before meals  insulin regular Infusion 4 Unit(s)/Hr (4 mL/Hr) IV Continuous <Continuous>    ========================INFECTIOUS DISEASE================  Temp 98.1F, WBC within normal limits at 9.96  Continue to monitor for fever and for leukocytosis      Patient requires continuous monitoring with bedside rhythm monitoring, arterial line, pulse oximetry, and intermittent blood gas analysis. Care plan discussed with ICU care team. Patient remains critical; required more than usual  postop ICU care. time spent 30 minutes    By signing my name below, I, Lalit Schneider, attest that this documentation has been prepared under the direction and in the presence of Rudolph Velasquez MD.  Electronically signed: Love Moncada, 12-19-20 @ 08:36    I, Rudolph Velasquez, personally performed the services described in this documentation. All medical record entries made by the love were at my direction and in my presence. I have reviewed the chart and agree that the record reflects my personal performance and is accurate and complete  Electronically signed: Rudolph Velasquez, 12-19-20 @ 08:36

## 2020-12-20 LAB
ALBUMIN SERPL ELPH-MCNC: 4 G/DL — SIGNIFICANT CHANGE UP (ref 3.3–5)
ALP SERPL-CCNC: 68 U/L — SIGNIFICANT CHANGE UP (ref 40–120)
ALT FLD-CCNC: 17 U/L — SIGNIFICANT CHANGE UP (ref 10–45)
ANION GAP SERPL CALC-SCNC: 11 MMOL/L — SIGNIFICANT CHANGE UP (ref 5–17)
AST SERPL-CCNC: 21 U/L — SIGNIFICANT CHANGE UP (ref 10–40)
BASOPHILS # BLD AUTO: 0.02 K/UL — SIGNIFICANT CHANGE UP (ref 0–0.2)
BASOPHILS NFR BLD AUTO: 0.3 % — SIGNIFICANT CHANGE UP (ref 0–2)
BILIRUB SERPL-MCNC: 0.7 MG/DL — SIGNIFICANT CHANGE UP (ref 0.2–1.2)
BUN SERPL-MCNC: 32 MG/DL — HIGH (ref 7–23)
CALCIUM SERPL-MCNC: 9 MG/DL — SIGNIFICANT CHANGE UP (ref 8.4–10.5)
CHLORIDE SERPL-SCNC: 105 MMOL/L — SIGNIFICANT CHANGE UP (ref 96–108)
CO2 SERPL-SCNC: 27 MMOL/L — SIGNIFICANT CHANGE UP (ref 22–31)
CREAT SERPL-MCNC: 1.53 MG/DL — HIGH (ref 0.5–1.3)
EOSINOPHIL # BLD AUTO: 0.05 K/UL — SIGNIFICANT CHANGE UP (ref 0–0.5)
EOSINOPHIL NFR BLD AUTO: 0.6 % — SIGNIFICANT CHANGE UP (ref 0–6)
GAS PNL BLDA: SIGNIFICANT CHANGE UP
GLUCOSE BLDC GLUCOMTR-MCNC: 134 MG/DL — HIGH (ref 70–99)
GLUCOSE BLDC GLUCOMTR-MCNC: 153 MG/DL — HIGH (ref 70–99)
GLUCOSE SERPL-MCNC: 164 MG/DL — HIGH (ref 70–99)
HCT VFR BLD CALC: 30.4 % — LOW (ref 39–50)
HGB BLD-MCNC: 10.1 G/DL — LOW (ref 13–17)
IMM GRANULOCYTES NFR BLD AUTO: 0.4 % — SIGNIFICANT CHANGE UP (ref 0–1.5)
LYMPHOCYTES # BLD AUTO: 1.06 K/UL — SIGNIFICANT CHANGE UP (ref 1–3.3)
LYMPHOCYTES # BLD AUTO: 13.6 % — SIGNIFICANT CHANGE UP (ref 13–44)
MAGNESIUM SERPL-MCNC: 2.8 MG/DL — HIGH (ref 1.6–2.6)
MCHC RBC-ENTMCNC: 30.4 PG — SIGNIFICANT CHANGE UP (ref 27–34)
MCHC RBC-ENTMCNC: 33.2 GM/DL — SIGNIFICANT CHANGE UP (ref 32–36)
MCV RBC AUTO: 91.6 FL — SIGNIFICANT CHANGE UP (ref 80–100)
MONOCYTES # BLD AUTO: 0.62 K/UL — SIGNIFICANT CHANGE UP (ref 0–0.9)
MONOCYTES NFR BLD AUTO: 7.9 % — SIGNIFICANT CHANGE UP (ref 2–14)
NEUTROPHILS # BLD AUTO: 6.02 K/UL — SIGNIFICANT CHANGE UP (ref 1.8–7.4)
NEUTROPHILS NFR BLD AUTO: 77.2 % — HIGH (ref 43–77)
NRBC # BLD: 0 /100 WBCS — SIGNIFICANT CHANGE UP (ref 0–0)
PHOSPHATE SERPL-MCNC: 1.8 MG/DL — LOW (ref 2.5–4.5)
PLATELET # BLD AUTO: 103 K/UL — LOW (ref 150–400)
POTASSIUM SERPL-MCNC: 4.3 MMOL/L — SIGNIFICANT CHANGE UP (ref 3.5–5.3)
POTASSIUM SERPL-SCNC: 4.3 MMOL/L — SIGNIFICANT CHANGE UP (ref 3.5–5.3)
PROT SERPL-MCNC: 6 G/DL — SIGNIFICANT CHANGE UP (ref 6–8.3)
RBC # BLD: 3.32 M/UL — LOW (ref 4.2–5.8)
RBC # FLD: 13.3 % — SIGNIFICANT CHANGE UP (ref 10.3–14.5)
SODIUM SERPL-SCNC: 143 MMOL/L — SIGNIFICANT CHANGE UP (ref 135–145)
WBC # BLD: 7.8 K/UL — SIGNIFICANT CHANGE UP (ref 3.8–10.5)
WBC # FLD AUTO: 7.8 K/UL — SIGNIFICANT CHANGE UP (ref 3.8–10.5)

## 2020-12-20 PROCEDURE — 71045 X-RAY EXAM CHEST 1 VIEW: CPT | Mod: 26

## 2020-12-20 PROCEDURE — 99291 CRITICAL CARE FIRST HOUR: CPT | Mod: 24

## 2020-12-20 PROCEDURE — 99233 SBSQ HOSP IP/OBS HIGH 50: CPT

## 2020-12-20 RX ORDER — POTASSIUM CHLORIDE 20 MEQ
40 PACKET (EA) ORAL ONCE
Refills: 0 | Status: COMPLETED | OUTPATIENT
Start: 2020-12-20 | End: 2020-12-19

## 2020-12-20 RX ORDER — INSULIN LISPRO 100/ML
8 VIAL (ML) SUBCUTANEOUS
Refills: 0 | Status: DISCONTINUED | OUTPATIENT
Start: 2020-12-20 | End: 2020-12-22

## 2020-12-20 RX ORDER — FUROSEMIDE 40 MG
40 TABLET ORAL DAILY
Refills: 0 | Status: DISCONTINUED | OUTPATIENT
Start: 2020-12-20 | End: 2020-12-21

## 2020-12-20 RX ORDER — POTASSIUM CHLORIDE 20 MEQ
20 PACKET (EA) ORAL ONCE
Refills: 0 | Status: COMPLETED | OUTPATIENT
Start: 2020-12-20 | End: 2020-12-20

## 2020-12-20 RX ORDER — POTASSIUM CHLORIDE 20 MEQ
40 PACKET (EA) ORAL ONCE
Refills: 0 | Status: COMPLETED | OUTPATIENT
Start: 2020-12-20 | End: 2020-12-20

## 2020-12-20 RX ORDER — ATORVASTATIN CALCIUM 80 MG/1
80 TABLET, FILM COATED ORAL AT BEDTIME
Refills: 0 | Status: DISCONTINUED | OUTPATIENT
Start: 2020-12-20 | End: 2020-12-22

## 2020-12-20 RX ORDER — INSULIN GLARGINE 100 [IU]/ML
28 INJECTION, SOLUTION SUBCUTANEOUS AT BEDTIME
Refills: 0 | Status: DISCONTINUED | OUTPATIENT
Start: 2020-12-20 | End: 2020-12-22

## 2020-12-20 RX ADMIN — Medication 50 MILLIGRAM(S): at 14:28

## 2020-12-20 RX ADMIN — TAMSULOSIN HYDROCHLORIDE 0.4 MILLIGRAM(S): 0.4 CAPSULE ORAL at 21:45

## 2020-12-20 RX ADMIN — Medication 50 MILLIGRAM(S): at 05:15

## 2020-12-20 RX ADMIN — Medication 1: at 07:39

## 2020-12-20 RX ADMIN — FAMOTIDINE 20 MILLIGRAM(S): 10 INJECTION INTRAVENOUS at 18:11

## 2020-12-20 RX ADMIN — ATORVASTATIN CALCIUM 80 MILLIGRAM(S): 80 TABLET, FILM COATED ORAL at 21:46

## 2020-12-20 RX ADMIN — Medication 50 MILLIGRAM(S): at 21:46

## 2020-12-20 RX ADMIN — POLYETHYLENE GLYCOL 3350 17 GRAM(S): 17 POWDER, FOR SOLUTION ORAL at 11:20

## 2020-12-20 RX ADMIN — Medication 8 UNIT(S): at 18:11

## 2020-12-20 RX ADMIN — OXYCODONE AND ACETAMINOPHEN 1 TABLET(S): 5; 325 TABLET ORAL at 05:50

## 2020-12-20 RX ADMIN — Medication 1: at 12:29

## 2020-12-20 RX ADMIN — Medication 40 MILLIGRAM(S): at 05:15

## 2020-12-20 RX ADMIN — Medication 20 MILLIEQUIVALENT(S): at 05:00

## 2020-12-20 RX ADMIN — OXYCODONE AND ACETAMINOPHEN 1 TABLET(S): 5; 325 TABLET ORAL at 05:20

## 2020-12-20 RX ADMIN — HEPARIN SODIUM 5000 UNIT(S): 5000 INJECTION INTRAVENOUS; SUBCUTANEOUS at 21:45

## 2020-12-20 RX ADMIN — FAMOTIDINE 20 MILLIGRAM(S): 10 INJECTION INTRAVENOUS at 05:15

## 2020-12-20 RX ADMIN — Medication 7 UNIT(S): at 07:39

## 2020-12-20 RX ADMIN — HEPARIN SODIUM 5000 UNIT(S): 5000 INJECTION INTRAVENOUS; SUBCUTANEOUS at 05:15

## 2020-12-20 RX ADMIN — OXYCODONE AND ACETAMINOPHEN 1 TABLET(S): 5; 325 TABLET ORAL at 14:30

## 2020-12-20 RX ADMIN — HEPARIN SODIUM 5000 UNIT(S): 5000 INJECTION INTRAVENOUS; SUBCUTANEOUS at 14:28

## 2020-12-20 RX ADMIN — AMLODIPINE BESYLATE 10 MILLIGRAM(S): 2.5 TABLET ORAL at 05:15

## 2020-12-20 RX ADMIN — INSULIN GLARGINE 28 UNIT(S): 100 INJECTION, SOLUTION SUBCUTANEOUS at 21:45

## 2020-12-20 RX ADMIN — Medication 325 MILLIGRAM(S): at 11:20

## 2020-12-20 RX ADMIN — CHLORHEXIDINE GLUCONATE 1 APPLICATION(S): 213 SOLUTION TOPICAL at 05:16

## 2020-12-20 RX ADMIN — Medication 40 MILLIEQUIVALENT(S): at 14:58

## 2020-12-20 RX ADMIN — Medication 62.5 MILLIMOLE(S): at 01:56

## 2020-12-20 RX ADMIN — OXYCODONE AND ACETAMINOPHEN 1 TABLET(S): 5; 325 TABLET ORAL at 15:15

## 2020-12-20 NOTE — PROGRESS NOTE ADULT - ASSESSMENT
Assessment  DMT2: 75y Male with DM T2 with hyperglycemia admitted with chest pain, s/p CABG, on basal bolus insulin, blood sugars improving, no hypoglycemic episode.  Patient is high risk with high level decision making due to uncontrolled diabetes, has increased risk for complications.  CAD: S/P CABG, on medications, monitored, stable.  HLD: On statin.          Alfa May MD  Cell: [phone]  Office: [phone]

## 2020-12-20 NOTE — PROGRESS NOTE ADULT - SUBJECTIVE AND OBJECTIVE BOX
KENDALL DAWSON  MRN-[id number]  Patient is a 75y old  Male who presents with a chief complaint of Cardiac Surgery (20 Dec 2020 16:32)    HPI:  History of Present Illness:    76 yo Male poor historian with PMHx of HTN, HLD, DM2, BPH and OA who presented to [facility] today for an elective cardiac cath.  Patient was seen and evaluated by Cardiologist Dr. Horvath for routine visit and patient was fount to have symptoms of CP with WINN and SOB x 5 months. Cardiac cath finding revealed significant multivessel CAD.  Preliminary cath finding:  EF 55%; LM Distal 50%, LAD Prox 50%Lcx Ostial 80%, OM closed 100%, RCA Closed 100%. Patient transferred to [facility] for cardiac surgery evaluation with Dr. Hairston. At this time patient reports asymptomatic denies CP, palpitation, SOB, WINN, HA, dizziness, N/V/D, fever or chills.  No acute event noted overnight.            (14 Dec 2020 17:39)      Surgery/Hospital course:  12/17 CABG x3    Today/Overnight:    Vital Signs Last 24 Hrs  T(C): 36.4 (20 Dec 2020 20:00), Max: 37.3 (20 Dec 2020 00:00)  T(F): 97.6 (20 Dec 2020 20:00), Max: 99.1 (20 Dec 2020 00:00)  HR: 87 (20 Dec 2020 20:00) (77 - 103)  BP: 125/66 (20 Dec 2020 18:00) (120/88 - 125/68)  BP(mean): 88 (20 Dec 2020 18:00) (80 - 91)  RR: 13 (20 Dec 2020 20:00) (12 - 31)  SpO2: 96% (20 Dec 2020 20:00) (88% - 97%)  ============================I/O===========================  I&O's Detail    19 Dec 2020 07:01  -  20 Dec 2020 07:00  --------------------------------------------------------  IN:    Albumin 5%  - 250 mL: 500 mL    IV PiggyBack: 250 mL    Oral Fluid: 60 mL    sodium chloride 0.9%: 240 mL  Total IN: 1050 mL    OUT:    Chest Tube (mL): 0 mL    Chest Tube (mL): 0 mL    Indwelling Catheter - Urethral (mL): 885 mL    Insulin: 0 mL    NiCARdipine: 0 mL    Voided (mL): 2750 mL  Total OUT: 3635 mL    Total NET: -2585 mL      20 Dec 2020 07:01  -  20 Dec 2020 20:33  --------------------------------------------------------  IN:    Oral Fluid: 720 mL  Total IN: 720 mL    OUT:    sodium chloride 0.9%: 0 mL    Voided (mL): 1050 mL  Total OUT: 1050 mL    Total NET: -330 mL        ============================ LABS =========================                        10.1   7.80  )-----------( 103      ( 20 Dec 2020 00:28 )             30.4     12-20    143  |  105  |  32<H>  ----------------------------<  164<H>  4.3   |  27  |  1.53<H>    Ca    9.0      20 Dec 2020 00:27  Phos  1.8     12-20  Mg     2.8     12-20    TPro  6.0  /  Alb  4.0  /  TBili  0.7  /  DBili  x   /  AST  21  /  ALT  17  /  AlkPhos  68  12-20    LIVER FUNCTIONS - ( 20 Dec 2020 00:27 )  Alb: 4.0 g/dL / Pro: 6.0 g/dL / ALK PHOS: 68 U/L / ALT: 17 U/L / AST: 21 U/L / GGT: x             ABG - ( 20 Dec 2020 17:01 )  pH, Arterial: 7.47  pH, Blood: x     /  pCO2: 42    /  pO2: 64    / HCO3: 30    / Base Excess: 5.9   /  SaO2: 93                  ======================Micro/Rad/Cardio=================  Culture: Reviewed   CXR: Reviewed  Echo: Reviewed  ======================================================  PAST MEDICAL & SURGICAL HISTORY:  Aneurysm of right leg    BPH (benign prostatic hyperplasia)    Osteoarthritis    Type 2 diabetes mellitus    HLD (hyperlipidemia)    HTN (hypertension)    CAD in native artery    BPH (benign prostatic hyperplasia)    OA (osteoarthritis)    DM (diabetes mellitus)    HLD (hyperlipidemia)    HTN (hypertension)    S/P appendectomy    H/O left inguinal hernia repair    S/P excision of ganglion cyst    S/P left inguinal hernia repair    S/P appendectomy      ========================ASSESSMENT ================  Hypoxemia  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  Hyperglycemia  Diabetes Mellitus Type 2  HLD (hyperlipidemia)  HTN (hypertension)  BPH  Thrombocytopenia    Plan:  ====================== NEUROLOGY=====================  Oxycodone PRN for analgesia     oxycodone    5 mG/acetaminophen 325 mG 1 Tablet(s) Oral every 6 hours PRN Moderate Pain (4 - 6)  oxycodone    5 mG/acetaminophen 325 mG 2 Tablet(s) Oral every 6 hours PRN Severe Pain (7 - 10)    ==================== RESPIRATORY======================  Hypoxemic respiratory failure on supplemental O2 via 50%50L/min high flow nasal cannula  Monitor RR, SpO2 via pulse oximetry, ABGs    ====================CARDIOVASCULAR==================  CAD s/p CABG x3 on 12/17  Continue PO Amlodipine and Metoprolol for blood pressure control   Continue ASA and Statin for history of CAD   Monitor hemodynamics, assess perfusion indices    amLODIPine   Tablet 10 milliGRAM(s) Oral daily  furosemide    Tablet 40 milliGRAM(s) Oral daily  metoprolol tartrate 50 milliGRAM(s) Oral every 8 hours  tamsulosin 0.4 milliGRAM(s) Oral at bedtime    ===================HEMATOLOGIC/ONC ===================  Thrombocytopenia   Monitor H&H and Platelets  SQ Heparin for VTE prophylaxis     aspirin enteric coated 325 milliGRAM(s) Oral daily  heparin   Injectable 5000 Unit(s) SubCutaneous every 8 hours    ===================== RENAL =========================  Continue to monitor I/Os, BUN/Creatinine, and urine output.   Diuresis with PO Lasix to maintain net negative fluid balance  Continue flomax for history of BPH    ==================== GASTROINTESTINAL===================  Tolerating consistent carb diet   Continue with Miralax for bowel regimen    GI prophylaxis, famotidine    Tablet 20 milliGRAM(s) Oral two times a day  polyethylene glycol 3350 17 Gram(s) Oral daily  sodium chloride 0.9%. 1000 milliLiter(s) (10 mL/Hr) IV Continuous <Continuous>    =======================    ENDOCRINE  =====================  History of DM type 2 requiring glycemic control with Lispro sliding scale, premeals and qhs, and SQ Lantus qhs.   Monitor blood glucose levels.     atorvastatin 80 milliGRAM(s) Oral at bedtime  dextrose 40% Gel 15 Gram(s) Oral once  dextrose 50% Injectable 25 Gram(s) IV Push once  dextrose 50% Injectable 12.5 Gram(s) IV Push once  dextrose 50% Injectable 25 Gram(s) IV Push once  insulin glargine Injectable (LANTUS) 28 Unit(s) SubCutaneous at bedtime  insulin lispro (ADMELOG) corrective regimen sliding scale   SubCutaneous three times a day before meals  insulin lispro (ADMELOG) corrective regimen sliding scale   SubCutaneous at bedtime  insulin lispro Injectable (ADMELOG) 8 Unit(s) SubCutaneous three times a day before meals    ========================INFECTIOUS DISEASE================  Afebrile, WBC within normal limits.  Monitor for fever/leukocytosis     Patient requires continuous monitoring with bedside rhythm monitoring, pulse oximetry monitoring, and continuous central venous and arterial pressure monitoring; and intermittent blood gas analysis.  Care plan discussed with ICU care team.    Patient remained critical, at risk for life threatening decompensation.   I have spent 35 minutes providing acute care with multiple re-evaluations throughout the evening.     By signing my name below, I, Scarlett Yeh, attest that this documentation has been prepared under the direction and in the presence of Annmarie Hernandez NP   Electronically signed: Maria Elena Romero, 12-20-20 @ 20:33    I, Annmarie Hernandez NP  personally performed the services described in this documentation. All medical record entries made by the diaibjasson were at my direction and in my presence. I have reviewed the chart and agree that the record reflects my personal performance and is accurate and complete  Electronically signed: Annmarie Hernandez NP  12-20-20 @ 20:33       KENDALL ESPINOSA  MRN-[id number]  Patient is a 75y old  Male who presents with a chief complaint of Cardiac Surgery (20 Dec 2020 16:32)    HPI:  History of Present Illness:    74 yo Male poor historian with PMHx of HTN, HLD, DM2, BPH and OA who presented to [facility] today for an elective cardiac cath.  Patient was seen and evaluated by Cardiologist Dr. Horvath for routine visit and patient was fount to have symptoms of CP with WINN and SOB x 5 months. Cardiac cath finding revealed significant multivessel CAD.  Preliminary cath finding:  EF 55%; LM Distal 50%, LAD Prox 50%Lcx Ostial 80%, OM closed 100%, RCA Closed 100%. Patient transferred to [facility] for cardiac surgery evaluation with Dr. Hairston. At this time patient reports asymptomatic denies CP, palpitation, SOB, WINN, HA, dizziness, N/V/D, fever or chills.  No acute event noted overnight.            (14 Dec 2020 17:39)      Surgery/Hospital course:  12/17 CABG x3    Today/Overnight:  pt weaned off highflow , placed on N/C .  Tele stable, on BB, hr  , will increase for HR control , bp is stable   lytes are replaced , he was diuresed today for post op CHF, with lasix, and remains on PO lasix /  Mr espinosa did ambulate >80 feet today with PT/ and nursing.   blood glucose are well controlled with lantus / ISS . ( goal <160)       Vital Signs Last 24 Hrs  T(C): 36.4 (20 Dec 2020 20:00), Max: 37.3 (20 Dec 2020 00:00)  T(F): 97.6 (20 Dec 2020 20:00), Max: 99.1 (20 Dec 2020 00:00)  HR: 87 (20 Dec 2020 20:00) (77 - 103)  BP: 125/66 (20 Dec 2020 18:00) (120/88 - 125/68)  BP(mean): 88 (20 Dec 2020 18:00) (80 - 91)  RR: 13 (20 Dec 2020 20:00) (12 - 31)  SpO2: 96% (20 Dec 2020 20:00) (88% - 97%)  ============================I/O===========================  I&O's Detail    19 Dec 2020 07:01  -  20 Dec 2020 07:00  --------------------------------------------------------  IN:    Albumin 5%  - 250 mL: 500 mL    IV PiggyBack: 250 mL    Oral Fluid: 60 mL    sodium chloride 0.9%: 240 mL  Total IN: 1050 mL    OUT:    Chest Tube (mL): 0 mL    Chest Tube (mL): 0 mL    Indwelling Catheter - Urethral (mL): 885 mL    Insulin: 0 mL    NiCARdipine: 0 mL    Voided (mL): 2750 mL  Total OUT: 3635 mL    Total NET: -2585 mL      20 Dec 2020 07:01  -  20 Dec 2020 20:33  --------------------------------------------------------  IN:    Oral Fluid: 720 mL  Total IN: 720 mL    OUT:    sodium chloride 0.9%: 0 mL    Voided (mL): 1050 mL  Total OUT: 1050 mL    Total NET: -330 mL        ============================ LABS =========================                        10.1   7.80  )-----------( 103      ( 20 Dec 2020 00:28 )             30.4     12-20    143  |  105  |  32<H>  ----------------------------<  164<H>  4.3   |  27  |  1.53<H>    Ca    9.0      20 Dec 2020 00:27  Phos  1.8     12-20  Mg     2.8     12-20    TPro  6.0  /  Alb  4.0  /  TBili  0.7  /  DBili  x   /  AST  21  /  ALT  17  /  AlkPhos  68  12-20    LIVER FUNCTIONS - ( 20 Dec 2020 00:27 )  Alb: 4.0 g/dL / Pro: 6.0 g/dL / ALK PHOS: 68 U/L / ALT: 17 U/L / AST: 21 U/L / GGT: x             ABG - ( 20 Dec 2020 17:01 )  pH, Arterial: 7.47  pH, Blood: x     /  pCO2: 42    /  pO2: 64    / HCO3: 30    / Base Excess: 5.9   /  SaO2: 93                  ======================Micro/Rad/Cardio=================  Culture: Reviewed   CXR: Reviewed  Echo: Reviewed  ======================================================  PAST MEDICAL & SURGICAL HISTORY:  Aneurysm of right leg    BPH (benign prostatic hyperplasia)    Osteoarthritis    Type 2 diabetes mellitus    HLD (hyperlipidemia)    HTN (hypertension)    CAD in native artery    BPH (benign prostatic hyperplasia)    OA (osteoarthritis)    DM (diabetes mellitus)    HLD (hyperlipidemia)    HTN (hypertension)    S/P appendectomy    H/O left inguinal hernia repair    S/P excision of ganglion cyst    S/P left inguinal hernia repair    S/P appendectomy      ========================ASSESSMENT ================  Hypoxemia  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  Hyperglycemia  Diabetes Mellitus Type 2  HLD (hyperlipidemia)  HTN (hypertension)  BPH  Thrombocytopenia    Plan:  ====================== NEUROLOGY=====================  Oxycodone PRN for analgesia     oxycodone    5 mG/acetaminophen 325 mG 1 Tablet(s) Oral every 6 hours PRN Moderate Pain (4 - 6)  oxycodone    5 mG/acetaminophen 325 mG 2 Tablet(s) Oral every 6 hours PRN Severe Pain (7 - 10)    ==================== RESPIRATORY======================  Hypoxemic respiratory failure on supplemental O2 via 50%50L/min high flow nasal cannula  Monitor RR, SpO2 via pulse oximetry, ABGs    ====================CARDIOVASCULAR==================  CAD s/p CABG x3 on 12/17  Continue PO Amlodipine and Metoprolol for blood pressure control   Continue ASA and Statin for history of CAD   Monitor hemodynamics, assess perfusion indices    amLODIPine   Tablet 10 milliGRAM(s) Oral daily  furosemide    Tablet 40 milliGRAM(s) Oral daily  metoprolol tartrate 50 milliGRAM(s) Oral every 8 hours  tamsulosin 0.4 milliGRAM(s) Oral at bedtime    ===================HEMATOLOGIC/ONC ===================  Thrombocytopenia   Monitor H&H and Platelets  SQ Heparin for VTE prophylaxis     aspirin enteric coated 325 milliGRAM(s) Oral daily  heparin   Injectable 5000 Unit(s) SubCutaneous every 8 hours    ===================== RENAL =========================  Continue to monitor I/Os, BUN/Creatinine, and urine output.   Diuresis with PO Lasix to maintain net negative fluid balance  Continue flomax for history of BPH    ==================== GASTROINTESTINAL===================  Tolerating consistent carb diet   Continue with Miralax for bowel regimen    GI prophylaxis, famotidine    Tablet 20 milliGRAM(s) Oral two times a day  polyethylene glycol 3350 17 Gram(s) Oral daily  sodium chloride 0.9%. 1000 milliLiter(s) (10 mL/Hr) IV Continuous <Continuous>    =======================    ENDOCRINE  =====================  History of DM type 2 requiring glycemic control with Lispro sliding scale, premeals and qhs, and SQ Lantus qhs.   Monitor blood glucose levels.     atorvastatin 80 milliGRAM(s) Oral at bedtime  dextrose 40% Gel 15 Gram(s) Oral once  dextrose 50% Injectable 25 Gram(s) IV Push once  dextrose 50% Injectable 12.5 Gram(s) IV Push once  dextrose 50% Injectable 25 Gram(s) IV Push once  insulin glargine Injectable (LANTUS) 28 Unit(s) SubCutaneous at bedtime  insulin lispro (ADMELOG) corrective regimen sliding scale   SubCutaneous three times a day before meals  insulin lispro (ADMELOG) corrective regimen sliding scale   SubCutaneous at bedtime  insulin lispro Injectable (ADMELOG) 8 Unit(s) SubCutaneous three times a day before meals    ========================INFECTIOUS DISEASE================  Afebrile, WBC within normal limits.  Monitor for fever/leukocytosis     Patient requires continuous monitoring with bedside rhythm monitoring, pulse oximetry monitoring, and continuous central venous and arterial pressure monitoring; and intermittent blood gas analysis.  Care plan discussed with ICU care team.    Patient remained critical, at risk for life threatening decompensation.   I have spent 35 minutes providing acute care with multiple re-evaluations throughout the evening.     By signing my name below, IScarlett, attest that this documentation has been prepared under the direction and in the presence of Annmarie Hernandez NP   Electronically signed: Love Romero, 12-20-20 @ 20:33    I, Annmarie Hernandez NP  personally performed the services described in this documentation. All medical record entries made by the love were at my direction and in my presence. I have reviewed the chart and agree that the record reflects my personal performance and is accurate and complete  Electronically signed: Annmarie Hernandez NP  12-20-20 @ 20:33

## 2020-12-20 NOTE — PROGRESS NOTE ADULT - SUBJECTIVE AND OBJECTIVE BOX
Chief complaint  Patient is a 75y old  Male who presents with a chief complaint of Cardiac Surgery (20 Dec 2020 07:53)   Review of systems  Patient in bed, appears comfortable.    Labs and Fingersticks  CAPILLARY BLOOD GLUCOSE  153 (20 Dec 2020 08:00)      POCT Blood Glucose.: 153 mg/dL (20 Dec 2020 07:37)      Anion Gap, Serum: 11 (12-20 @ 00:27)  Anion Gap, Serum: 11 (12-19 @ 00:29)      Calcium, Total Serum: 9.0 (12-20 @ 00:27)  Calcium, Total Serum: 9.1 (12-19 @ 00:29)  Albumin, Serum: 4.0 (12-20 @ 00:27)  Albumin, Serum: 3.9 (12-19 @ 00:29)    Alanine Aminotransferase (ALT/SGPT): 17 (12-20 @ 00:27)  Alanine Aminotransferase (ALT/SGPT): 18 (12-19 @ 00:29)  Alkaline Phosphatase, Serum: 68 (12-20 @ 00:27)  Alkaline Phosphatase, Serum: 59 (12-19 @ 00:29)  Aspartate Aminotransferase (AST/SGOT): 21 (12-20 @ 00:27)  Aspartate Aminotransferase (AST/SGOT): 27 (12-19 @ 00:29)        12-20    143  |  105  |  32<H>  ----------------------------<  164<H>  4.3   |  27  |  1.53<H>    Ca    9.0      20 Dec 2020 00:27  Phos  1.8     12-20  Mg     2.8     12-20    TPro  6.0  /  Alb  4.0  /  TBili  0.7  /  DBili  x   /  AST  21  /  ALT  17  /  AlkPhos  68  12-20                        10.1   7.80  )-----------( 103      ( 20 Dec 2020 00:28 )             30.4     Medications  MEDICATIONS  (STANDING):  amLODIPine   Tablet 10 milliGRAM(s) Oral daily  aspirin enteric coated 325 milliGRAM(s) Oral daily  atorvastatin 80 milliGRAM(s) Oral at bedtime  chlorhexidine 2% Cloths 1 Application(s) Topical <User Schedule>  dextrose 40% Gel 15 Gram(s) Oral once  dextrose 50% Injectable 25 Gram(s) IV Push once  dextrose 50% Injectable 12.5 Gram(s) IV Push once  dextrose 50% Injectable 25 Gram(s) IV Push once  famotidine    Tablet 20 milliGRAM(s) Oral two times a day  furosemide    Tablet 40 milliGRAM(s) Oral daily  heparin   Injectable 5000 Unit(s) SubCutaneous every 8 hours  insulin glargine Injectable (LANTUS) 28 Unit(s) SubCutaneous at bedtime  insulin lispro (ADMELOG) corrective regimen sliding scale   SubCutaneous three times a day before meals  insulin lispro (ADMELOG) corrective regimen sliding scale   SubCutaneous at bedtime  insulin lispro Injectable (ADMELOG) 8 Unit(s) SubCutaneous three times a day before meals  metoprolol tartrate 50 milliGRAM(s) Oral every 8 hours  polyethylene glycol 3350 17 Gram(s) Oral daily  sodium chloride 0.9%. 1000 milliLiter(s) (10 mL/Hr) IV Continuous <Continuous>  tamsulosin 0.4 milliGRAM(s) Oral at bedtime      Physical Exam  General: Patient comfortable in bed  Vital Signs Last 12 Hrs  T(F): 98.2 (12-20-20 @ 12:00), Max: 98.7 (12-20-20 @ 08:00)  HR: 99 (12-20-20 @ 14:00) (77 - 99)  BP: 125/68 (12-20-20 @ 13:00) (120/88 - 125/68)  BP(mean): 91 (12-20-20 @ 13:00) (80 - 91)  RR: 19 (12-20-20 @ 14:00) (13 - 28)  SpO2: 91% (12-20-20 @ 14:00) (88% - 95%)  Neck: No palpable thyroid nodules.  CVS: S1S2, No murmurs  Respiratory: No wheezing, no crepitations  GI: Abdomen soft, bowel sounds positive  Musculoskeletal:  edema lower extremities.     Diagnostics

## 2020-12-20 NOTE — PROGRESS NOTE ADULT - SUBJECTIVE AND OBJECTIVE BOX
KENDALL DAWSON  MRN-[id number]  Patient is a 75y old  Male who presents with a chief complaint of Cardiac Surgery (19 Dec 2020 18:48)    HPI:  74 yo Male poor historian with PMHx of HTN, HLD, DM2, BPH and OA who presented to [facility] today for an elective cardiac cath.  Patient was seen and evaluated by Cardiologist Dr. Horvath for routine visit and patient was fount to have symptoms of CP with WINN and SOB x 5 months. Cardiac cath finding revealed significant multivessel CAD.  Preliminary cath finding:  EF 55%; LM Distal 50%, LAD Prox 50%Lcx Ostial 80%, OM closed 100%, RCA Closed 100%. Patient transferred to [facility] for cardiac surgery evaluation with Dr. Hairston. At this time patient reports asymptomatic denies CP, palpitation, SOB, WINN, HA, dizziness, N/V/D, fever or chills.  No acute event noted overnight. (14 Dec 2020 17:39)      Hospital course:  12/17 CABG x3    Today:    REVIEW OF SYSTEMS:  Gen: No fever  EYES/ENT: No visual changes;  No vertigo or throat pain   NECK: No pain   RES:  No shortness of breath or Cough  Chest: + incisional pain  CARD: No chest pain   GI: No abdominal pain  : No dysuria  NEURO: No weakness  SKIN: No itching, rashes     Physical Exam:  Vital Signs Last 24 Hrs  T(C): 36.4 (20 Dec 2020 04:00), Max: 37.3 (19 Dec 2020 20:00)  T(F): 97.6 (20 Dec 2020 04:00), Max: 99.1 (19 Dec 2020 20:00)  HR: 77 (20 Dec 2020 07:00) (75 - 96)  BP: --  BP(mean): --  RR: 16 (20 Dec 2020 07:00) (13 - 32)  SpO2: 93% (20 Dec 2020 07:00) (91% - 97%)  Gen:  Awake, alert   CNS: non focal 	  Neck: no JVD  RES : clear , no wheezing    Chest:   + chest tubes                     CVS: Regular  rhythm. Normal S1/S2  Abd: Soft, non-distended. Bowel sounds present.  Skin: No rash.  Ext:  no edema, A Line    ============================I/O===========================   I&O's Detail    19 Dec 2020 07:01  -  20 Dec 2020 07:00  --------------------------------------------------------  IN:    Albumin 5%  - 250 mL: 500 mL    IV PiggyBack: 250 mL    Oral Fluid: 60 mL    sodium chloride 0.9%: 240 mL  Total IN: 1050 mL    OUT:    Chest Tube (mL): 0 mL    Chest Tube (mL): 0 mL    Indwelling Catheter - Urethral (mL): 885 mL    Insulin: 0 mL    NiCARdipine: 0 mL    Voided (mL): 2750 mL  Total OUT: 3635 mL    Total NET: -2585 mL        ============================ LABS =========================                        10.1   7.80  )-----------( 103      ( 20 Dec 2020 00:28 )             30.4     12-20    143  |  105  |  32<H>  ----------------------------<  164<H>  4.3   |  27  |  1.53<H>    Ca    9.0      20 Dec 2020 00:27  Phos  1.8     12-20  Mg     2.8     12-20    TPro  6.0  /  Alb  4.0  /  TBili  0.7  /  DBili  x   /  AST  21  /  ALT  17  /  AlkPhos  68  12-20    LIVER FUNCTIONS - ( 20 Dec 2020 00:27 )  Alb: 4.0 g/dL / Pro: 6.0 g/dL / ALK PHOS: 68 U/L / ALT: 17 U/L / AST: 21 U/L / GGT: x             ABG - ( 20 Dec 2020 05:02 )  pH, Arterial: 7.46  pH, Blood: x     /  pCO2: 40    /  pO2: 70    / HCO3: 28    / Base Excess: 4.1   /  SaO2: 95                  ======================Micro/Rad/Cardio=================  Culture: Reviewed   CXR: Reviewed  Echo: Reviewed  ======================================================  PAST MEDICAL & SURGICAL HISTORY:  Aneurysm of right leg    BPH (benign prostatic hyperplasia)    Osteoarthritis    Type 2 diabetes mellitus    HLD (hyperlipidemia)    HTN (hypertension)    CAD in native artery    BPH (benign prostatic hyperplasia)    OA (osteoarthritis)    DM (diabetes mellitus)    HLD (hyperlipidemia)    HTN (hypertension)    S/P appendectomy    H/O left inguinal hernia repair    S/P excision of ganglion cyst    S/P left inguinal hernia repair    S/P appendectomy      ====================ASSESSMENT ==============  Hypoxemia  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  Hyperglycemia  Diabetes Mellitus Type 2  HLD (hyperlipidemia)  HTN (hypertension)  BPH  Thrombocytopenia    Plan:  ====================== NEUROLOGY=====================  Nonfocal, continue to monitor neuro status per ICU protocol.   Percocet PRN for analgesia    oxycodone    5 mG/acetaminophen 325 mG 1 Tablet(s) Oral every 6 hours PRN Moderate Pain (4 - 6)  oxycodone    5 mG/acetaminophen 325 mG 2 Tablet(s) Oral every 6 hours PRN Severe Pain (7 - 10)  ==================== RESPIRATORY======================  Hypoxemic respiratory failure on supplemental O2 via 80%50L/min high flow nasal cannula  Monitor RR, SpO2 via pulse oximetry, ABGs  Continue  bronchodilators    ====================CARDIOVASCULAR==================  CAD s/p CABG x3 on 12/17  Continue PO Amlodipine and Metoprolol for blood pressure control   C/w ASA/Statin for graft patency, hx of CAD  Monitor hemodynamics, assess perfusion indices    amLODIPine   Tablet 10 milliGRAM(s) Oral daily  metoprolol tartrate 50 milliGRAM(s) Oral every 8 hours  atorvastatin 80 milliGRAM(s) Oral at bedtime  aspirin enteric coated 325 milliGRAM(s) Oral daily  ===================HEMATOLOGIC/ONC ===================  H/H 10/31  Thrombocytopenia, platelet count at 103k  Continue close monitoring of hemoglobin, hematocrit and platelet levels  Continue SQ Heparin for venous thromboembolism prophylaxis.     heparin   Injectable 5000 Unit(s) SubCutaneous every 8 hours  ===================== RENAL =========================  Monitor electrolytes, I/Os, BUN/Creatinine and urine output  Continue diuresis with IV Lasix to maintain net negative fluid balance  Flomax for hx of  BPH    tamsulosin 0.4 milliGRAM(s) Oral at bedtime  furosemide    Tablet 40 milliGRAM(s) Oral daily  ==================== GASTROINTESTINAL===================  Tolerating consistent carb diet   Continue with Miralax for bowel regimen  Pepcid for stress ulcer prophylaxis    famotidine    Tablet 20 milliGRAM(s) Oral two times a day  polyethylene glycol 3350 17 Gram(s) Oral daily  sodium chloride 0.9%. 1000 milliLiter(s) (10 mL/Hr) IV Continuous <Continuous>    =======================    ENDOCRINE  =====================  History of type 2 diabetes mellitus, requiring glycemic control with Lispro sliding scale and Lantus   Continue monitoring blood glucose levels    dextrose 40% Gel 15 Gram(s) Oral once  dextrose 50% Injectable 25 Gram(s) IV Push once  dextrose 50% Injectable 12.5 Gram(s) IV Push once  dextrose 50% Injectable 25 Gram(s) IV Push once  insulin glargine Injectable (LANTUS) 25 Unit(s) SubCutaneous at bedtime  insulin lispro (ADMELOG) corrective regimen sliding scale   SubCutaneous three times a day before meals  insulin lispro (ADMELOG) corrective regimen sliding scale   SubCutaneous at bedtime  insulin lispro Injectable (ADMELOG) 7 Unit(s) SubCutaneous three times a day before meals    ========================INFECTIOUS DISEASE================  Afebrile, WBC within normal limits.   Monitor for leukocytosis / fever. Monitor off abx.       Patient requires continuous monitoring with bedside rhythm monitoring, arterial line, pulse oximetry; intermittent blood gas analysis. Care plan discussed with ICU care team. Patient remains critical; required more than usual ICU care.     By signing my name below, I, Mary Carmen Maldonado, attest that this documentation has been prepared under the direction and in the presence of Rudolph Velasquez MD.  Electronically signed: Love Mendieta, 12-20-20 @ 07:53    I, Rudolph Velasquez, personally performed the services described in this documentation. all medical record entries made by the love were at my direction and in my presence. I have reviewed the chart and agree that the record reflects my personal performance and is accurate and complete  Electronically signed: Mary Carmen Maldonado 12-20-20 @ 07:53       KENDALL DAWSON  [id number]  Patient is a 75y old  Male who presents with a chief complaint of Cardiac Surgery (19 Dec 2020 18:48)    HPI:  74 yo Male poor historian with PMHx of HTN, HLD, DM2, BPH and OA who presented to [facility] today for an elective cardiac cath.  Patient was seen and evaluated by Cardiologist Dr. Horvath for routine visit and patient was fount to have symptoms of CP with WINN and SOB x 5 months. Cardiac cath finding revealed significant multivessel CAD.  Preliminary cath finding:  EF 55%; LM Distal 50%, LAD Prox 50%Lcx Ostial 80%, OM closed 100%, RCA Closed 100%. Patient transferred to [facility] for cardiac surgery evaluation with Dr. Hairston. At this time patient reports asymptomatic denies CP, palpitation, SOB, WINN, HA, dizziness, N/V/D, fever or chills.  No acute event noted overnight. (14 Dec 2020 17:39)      Hospital course:  12/17 CABG x3    Today:   titrate down FiO2 to 50%, 50 liter/minute   denies chest pain or short of breath    REVIEW OF SYSTEMS:  Gen: No fever  EYES/ENT: No visual changes;  No vertigo or throat pain   NECK: No pain   RES:  No shortness of breath or Cough  CARD: No chest pain   GI: No abdominal pain  : No dysuria  NEURO: No weakness  SKIN: No itching, rashes     Physical Exam:  Vital Signs Last 24 Hrs  T(C): 36.4 (20 Dec 2020 04:00), Max: 37.3 (19 Dec 2020 20:00)  T(F): 97.6 (20 Dec 2020 04:00), Max: 99.1 (19 Dec 2020 20:00)  HR: 77 (20 Dec 2020 07:00) (75 - 96)  BP: --  BP(mean): --  RR: 16 (20 Dec 2020 07:00) (13 - 32)  SpO2: 93% (20 Dec 2020 07:00) (91% - 97%)  Gen:  Awake, alert   CNS: non focal 	  Neck: no JVD  RES : clear , no wheezing                       CVS: Regular  rhythm. Normal S1/S2  Abd: Soft, non-distended. Bowel sounds present.  Skin: No rash.  Ext:  no edema, A Line    ============================I/O===========================   I&O's Detail    19 Dec 2020 07:01  -  20 Dec 2020 07:00  --------------------------------------------------------  IN:    Albumin 5%  - 250 mL: 500 mL    IV PiggyBack: 250 mL    Oral Fluid: 60 mL    sodium chloride 0.9%: 240 mL  Total IN: 1050 mL    OUT:    Chest Tube (mL): 0 mL    Chest Tube (mL): 0 mL    Indwelling Catheter - Urethral (mL): 885 mL    Insulin: 0 mL    NiCARdipine: 0 mL    Voided (mL): 2750 mL  Total OUT: 3635 mL    Total NET: -2585 mL        ============================ LABS =========================                        10.1   7.80  )-----------( 103      ( 20 Dec 2020 00:28 )             30.4     12-20    143  |  105  |  32<H>  ----------------------------<  164<H>  4.3   |  27  |  1.53<H>    Ca    9.0      20 Dec 2020 00:27  Phos  1.8     12-20  Mg     2.8     12-20    TPro  6.0  /  Alb  4.0  /  TBili  0.7  /  DBili  x   /  AST  21  /  ALT  17  /  AlkPhos  68  12-20    LIVER FUNCTIONS - ( 20 Dec 2020 00:27 )  Alb: 4.0 g/dL / Pro: 6.0 g/dL / ALK PHOS: 68 U/L / ALT: 17 U/L / AST: 21 U/L / GGT: x             ABG - ( 20 Dec 2020 05:02 )  pH, Arterial: 7.46  pH, Blood: x     /  pCO2: 40    /  pO2: 70    / HCO3: 28    / Base Excess: 4.1   /  SaO2: 95                  ======================Micro/Rad/Cardio=================  Culture: Reviewed   CXR: Reviewed  Echo: Reviewed  ======================================================  PAST MEDICAL & SURGICAL HISTORY:  Aneurysm of right leg    BPH (benign prostatic hyperplasia)    Osteoarthritis    Type 2 diabetes mellitus    HLD (hyperlipidemia)    HTN (hypertension)    CAD in native artery    BPH (benign prostatic hyperplasia)    OA (osteoarthritis)    DM (diabetes mellitus)    HLD (hyperlipidemia)    HTN (hypertension)    S/P appendectomy    H/O left inguinal hernia repair    S/P excision of ganglion cyst    S/P left inguinal hernia repair    S/P appendectomy      ====================ASSESSMENT ==============  Hypoxemia  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  Hyperglycemia  Diabetes Mellitus Type 2  HLD (hyperlipidemia)  HTN (hypertension)  BPH  Thrombocytopenia    Plan:  ====================== NEUROLOGY=====================  Percocet PRN for analgesia    oxycodone    5 mG/acetaminophen 325 mG 1 Tablet(s) Oral every 6 hours PRN Moderate Pain (4 - 6)  oxycodone    5 mG/acetaminophen 325 mG 2 Tablet(s) Oral every 6 hours PRN Severe Pain (7 - 10)  ==================== RESPIRATORY======================  Hypoxemic respiratory failure on supplemental O2 via 80%50L/min high flow nasal cannula  Monitor RR, SpO2 via pulse oximetry, ABGs  Continue  bronchodilators    ====================CARDIOVASCULAR==================  CAD s/p CABG x3 on 12/17  Continue PO Amlodipine and Metoprolol for blood pressure control   C/w ASA/Statin for graft patency, hx of CAD  Monitor hemodynamics, assess perfusion indices    amLODIPine   Tablet 10 milliGRAM(s) Oral daily  metoprolol tartrate 50 milliGRAM(s) Oral every 8 hours  atorvastatin 80 milliGRAM(s) Oral at bedtime  aspirin enteric coated 325 milliGRAM(s) Oral daily  ===================HEMATOLOGIC/ONC ===================  H/H 10/31  Thrombocytopenia, platelet count at 103k  Continue close monitoring of hemoglobin, hematocrit and platelet levels  Continue SQ Heparin for venous thromboembolism prophylaxis.     heparin   Injectable 5000 Unit(s) SubCutaneous every 8 hours  ===================== RENAL =========================  Monitor electrolytes, I/Os, BUN/Creatinine and urine output  Continue diuresis with IV Lasix to maintain net negative fluid balance  Flomax for hx of  BPH    tamsulosin 0.4 milliGRAM(s) Oral at bedtime  furosemide    Tablet 40 milliGRAM(s) Oral daily  ==================== GASTROINTESTINAL===================  Tolerating consistent carb diet   Continue with Miralax for bowel regimen  Pepcid for stress ulcer prophylaxis    famotidine    Tablet 20 milliGRAM(s) Oral two times a day  polyethylene glycol 3350 17 Gram(s) Oral daily  sodium chloride 0.9%. 1000 milliLiter(s) (10 mL/Hr) IV Continuous <Continuous>    =======================    ENDOCRINE  =====================  History of type 2 diabetes mellitus, requiring glycemic control with Lispro sliding scale and Lantus   Continue monitoring blood glucose levels    dextrose 40% Gel 15 Gram(s) Oral once  dextrose 50% Injectable 25 Gram(s) IV Push once  dextrose 50% Injectable 12.5 Gram(s) IV Push once  dextrose 50% Injectable 25 Gram(s) IV Push once  insulin glargine Injectable (LANTUS) 25 Unit(s) SubCutaneous at bedtime  insulin lispro (ADMELOG) corrective regimen sliding scale   SubCutaneous three times a day before meals  insulin lispro (ADMELOG) corrective regimen sliding scale   SubCutaneous at bedtime  insulin lispro Injectable (ADMELOG) 7 Unit(s) SubCutaneous three times a day before meals    ========================INFECTIOUS DISEASE================  Afebrile, WBC within normal limits.   Monitor for leukocytosis / fever. Monitor off abx.       Patient requires continuous monitoring with bedside rhythm monitoring, arterial line, pulse oximetry; intermittent blood gas analysis. Care plan discussed with ICU care team. Patient remains critical; required more than usual ICU care.     By signing my name below, I, Mary Carmen Maldonado, attest that this documentation has been prepared under the direction and in the presence of Rudolph Velasquez MD.  Electronically signed: Love Mendieta, 12-20-20 @ 07:53    I, Rudolph Velasquez, personally performed the services described in this documentation. all medical record entries made by the love were at my direction and in my presence. I have reviewed the chart and agree that the record reflects my personal performance and is accurate and complete  Electronically signed: Mary Carmen Maldonado 12-20-20 @ 07:53

## 2020-12-21 DIAGNOSIS — N17.9 ACUTE KIDNEY FAILURE, UNSPECIFIED: ICD-10-CM

## 2020-12-21 LAB
ALBUMIN SERPL ELPH-MCNC: 3.6 G/DL — SIGNIFICANT CHANGE UP (ref 3.3–5)
ALP SERPL-CCNC: 68 U/L — SIGNIFICANT CHANGE UP (ref 40–120)
ALT FLD-CCNC: 18 U/L — SIGNIFICANT CHANGE UP (ref 10–45)
ANION GAP SERPL CALC-SCNC: 11 MMOL/L — SIGNIFICANT CHANGE UP (ref 5–17)
AST SERPL-CCNC: 17 U/L — SIGNIFICANT CHANGE UP (ref 10–40)
BILIRUB SERPL-MCNC: 0.7 MG/DL — SIGNIFICANT CHANGE UP (ref 0.2–1.2)
BUN SERPL-MCNC: 37 MG/DL — HIGH (ref 7–23)
CALCIUM SERPL-MCNC: 9.1 MG/DL — SIGNIFICANT CHANGE UP (ref 8.4–10.5)
CHLORIDE SERPL-SCNC: 104 MMOL/L — SIGNIFICANT CHANGE UP (ref 96–108)
CO2 SERPL-SCNC: 27 MMOL/L — SIGNIFICANT CHANGE UP (ref 22–31)
CREAT SERPL-MCNC: 1.69 MG/DL — HIGH (ref 0.5–1.3)
GAS PNL BLDA: SIGNIFICANT CHANGE UP
GLUCOSE BLDC GLUCOMTR-MCNC: 102 MG/DL — HIGH (ref 70–99)
GLUCOSE BLDC GLUCOMTR-MCNC: 119 MG/DL — HIGH (ref 70–99)
GLUCOSE BLDC GLUCOMTR-MCNC: 147 MG/DL — HIGH (ref 70–99)
GLUCOSE BLDC GLUCOMTR-MCNC: 194 MG/DL — HIGH (ref 70–99)
GLUCOSE SERPL-MCNC: 119 MG/DL — HIGH (ref 70–99)
HCT VFR BLD CALC: 30.1 % — LOW (ref 39–50)
HGB BLD-MCNC: 9.7 G/DL — LOW (ref 13–17)
MAGNESIUM SERPL-MCNC: 2.4 MG/DL — SIGNIFICANT CHANGE UP (ref 1.6–2.6)
MCHC RBC-ENTMCNC: 29.9 PG — SIGNIFICANT CHANGE UP (ref 27–34)
MCHC RBC-ENTMCNC: 32.2 GM/DL — SIGNIFICANT CHANGE UP (ref 32–36)
MCV RBC AUTO: 92.9 FL — SIGNIFICANT CHANGE UP (ref 80–100)
NRBC # BLD: 0 /100 WBCS — SIGNIFICANT CHANGE UP (ref 0–0)
PHOSPHATE SERPL-MCNC: 2.5 MG/DL — SIGNIFICANT CHANGE UP (ref 2.5–4.5)
PLATELET # BLD AUTO: 133 K/UL — LOW (ref 150–400)
POTASSIUM BLDA-SCNC: 4 MMOL/L — SIGNIFICANT CHANGE UP (ref 3.5–5.3)
POTASSIUM SERPL-MCNC: 4 MMOL/L — SIGNIFICANT CHANGE UP (ref 3.5–5.3)
POTASSIUM SERPL-SCNC: 4 MMOL/L — SIGNIFICANT CHANGE UP (ref 3.5–5.3)
PROT SERPL-MCNC: 5.9 G/DL — LOW (ref 6–8.3)
RBC # BLD: 3.24 M/UL — LOW (ref 4.2–5.8)
RBC # FLD: 13.2 % — SIGNIFICANT CHANGE UP (ref 10.3–14.5)
SODIUM SERPL-SCNC: 142 MMOL/L — SIGNIFICANT CHANGE UP (ref 135–145)
WBC # BLD: 7.05 K/UL — SIGNIFICANT CHANGE UP (ref 3.8–10.5)
WBC # FLD AUTO: 7.05 K/UL — SIGNIFICANT CHANGE UP (ref 3.8–10.5)

## 2020-12-21 PROCEDURE — 71045 X-RAY EXAM CHEST 1 VIEW: CPT | Mod: 26

## 2020-12-21 PROCEDURE — 99233 SBSQ HOSP IP/OBS HIGH 50: CPT

## 2020-12-21 RX ORDER — POTASSIUM CHLORIDE 20 MEQ
20 PACKET (EA) ORAL
Refills: 0 | Status: COMPLETED | OUTPATIENT
Start: 2020-12-21 | End: 2020-12-21

## 2020-12-21 RX ORDER — ALBUMIN HUMAN 25 %
250 VIAL (ML) INTRAVENOUS ONCE
Refills: 0 | Status: COMPLETED | OUTPATIENT
Start: 2020-12-21 | End: 2020-12-21

## 2020-12-21 RX ORDER — FAMOTIDINE 10 MG/ML
20 INJECTION INTRAVENOUS DAILY
Refills: 0 | Status: DISCONTINUED | OUTPATIENT
Start: 2020-12-21 | End: 2020-12-22

## 2020-12-21 RX ORDER — SODIUM CHLORIDE 9 MG/ML
3 INJECTION INTRAMUSCULAR; INTRAVENOUS; SUBCUTANEOUS EVERY 8 HOURS
Refills: 0 | Status: DISCONTINUED | OUTPATIENT
Start: 2020-12-21 | End: 2020-12-22

## 2020-12-21 RX ADMIN — Medication 500 MILLILITER(S): at 11:33

## 2020-12-21 RX ADMIN — Medication 8 UNIT(S): at 08:33

## 2020-12-21 RX ADMIN — HEPARIN SODIUM 5000 UNIT(S): 5000 INJECTION INTRAVENOUS; SUBCUTANEOUS at 04:45

## 2020-12-21 RX ADMIN — FAMOTIDINE 20 MILLIGRAM(S): 10 INJECTION INTRAVENOUS at 04:44

## 2020-12-21 RX ADMIN — TAMSULOSIN HYDROCHLORIDE 0.4 MILLIGRAM(S): 0.4 CAPSULE ORAL at 21:35

## 2020-12-21 RX ADMIN — Medication 325 MILLIGRAM(S): at 11:45

## 2020-12-21 RX ADMIN — HEPARIN SODIUM 5000 UNIT(S): 5000 INJECTION INTRAVENOUS; SUBCUTANEOUS at 21:36

## 2020-12-21 RX ADMIN — Medication 20 MILLIEQUIVALENT(S): at 01:13

## 2020-12-21 RX ADMIN — Medication 50 MILLIGRAM(S): at 15:14

## 2020-12-21 RX ADMIN — Medication 40 MILLIGRAM(S): at 04:45

## 2020-12-21 RX ADMIN — INSULIN GLARGINE 28 UNIT(S): 100 INJECTION, SOLUTION SUBCUTANEOUS at 21:51

## 2020-12-21 RX ADMIN — SODIUM CHLORIDE 3 MILLILITER(S): 9 INJECTION INTRAMUSCULAR; INTRAVENOUS; SUBCUTANEOUS at 21:36

## 2020-12-21 RX ADMIN — OXYCODONE AND ACETAMINOPHEN 1 TABLET(S): 5; 325 TABLET ORAL at 08:30

## 2020-12-21 RX ADMIN — ATORVASTATIN CALCIUM 80 MILLIGRAM(S): 80 TABLET, FILM COATED ORAL at 21:36

## 2020-12-21 RX ADMIN — AMLODIPINE BESYLATE 10 MILLIGRAM(S): 2.5 TABLET ORAL at 04:45

## 2020-12-21 RX ADMIN — HEPARIN SODIUM 5000 UNIT(S): 5000 INJECTION INTRAVENOUS; SUBCUTANEOUS at 15:14

## 2020-12-21 RX ADMIN — Medication 50 MILLIGRAM(S): at 04:45

## 2020-12-21 RX ADMIN — SODIUM CHLORIDE 3 MILLILITER(S): 9 INJECTION INTRAMUSCULAR; INTRAVENOUS; SUBCUTANEOUS at 15:09

## 2020-12-21 RX ADMIN — FAMOTIDINE 20 MILLIGRAM(S): 10 INJECTION INTRAVENOUS at 11:45

## 2020-12-21 RX ADMIN — OXYCODONE AND ACETAMINOPHEN 2 TABLET(S): 5; 325 TABLET ORAL at 21:34

## 2020-12-21 RX ADMIN — Medication 8 UNIT(S): at 17:24

## 2020-12-21 RX ADMIN — OXYCODONE AND ACETAMINOPHEN 1 TABLET(S): 5; 325 TABLET ORAL at 09:00

## 2020-12-21 RX ADMIN — CHLORHEXIDINE GLUCONATE 1 APPLICATION(S): 213 SOLUTION TOPICAL at 04:45

## 2020-12-21 RX ADMIN — Medication 20 MILLIEQUIVALENT(S): at 04:44

## 2020-12-21 RX ADMIN — Medication 8 UNIT(S): at 11:45

## 2020-12-21 RX ADMIN — POLYETHYLENE GLYCOL 3350 17 GRAM(S): 17 POWDER, FOR SOLUTION ORAL at 11:45

## 2020-12-21 RX ADMIN — OXYCODONE AND ACETAMINOPHEN 2 TABLET(S): 5; 325 TABLET ORAL at 22:06

## 2020-12-21 RX ADMIN — Medication 50 MILLIGRAM(S): at 21:35

## 2020-12-21 NOTE — CONSULT NOTE ADULT - SUBJECTIVE AND OBJECTIVE BOX
NEPHROLOGY - [facility]    Patient seen and examined.    HPI:  History of Present Illness:    76 yo Male poor historian with PMHx of HTN, HLD, DM2, BPH and OA who presented to [facility] today for an elective cardiac cath.  Patient was seen and evaluated by Cardiologist Dr. Horvath for routine visit and patient was fount to have symptoms of CP with WINN and SOB x 5 months. Cardiac cath finding revealed significant multivessel CAD.  Preliminary cath finding:  EF 55%; LM Distal 50%, LAD Prox 50%Lcx Ostial 80%, OM closed 100%, RCA Closed 100%. Patient transferred to [facility] for cardiac surgery evaluation with Dr. Hairston. At this time patient reports asymptomatic denies CP, palpitation, SOB, WINN, HA, dizziness, N/V/D, fever or chills.  No acute event noted overnight.   Pt is sp CABG on the 17 and the creatinine went up and renal consult was obtained   There is no hematuria or bubbles in the urine.  No history of NSAIDS or nephrolithisis.  The patient urinates once or twice in the night and there is no incontinence.  No family hx or renal disease or back pain.    No recent abx use.  No alleviating or aggravating factors with respect to the kidneys.        (14 Dec 2020 17:39)      PAST MEDICAL & SURGICAL HISTORY:  Aneurysm of right leg    BPH (benign prostatic hyperplasia)    Osteoarthritis    Type 2 diabetes mellitus    HLD (hyperlipidemia)    HTN (hypertension)    CAD in native artery    BPH (benign prostatic hyperplasia)    OA (osteoarthritis)    DM (diabetes mellitus)    HLD (hyperlipidemia)    HTN (hypertension)    S/P appendectomy    H/O left inguinal hernia repair    S/P excision of ganglion cyst    S/P left inguinal hernia repair    S/P appendectomy        MEDICATIONS  (STANDING):  amLODIPine   Tablet 10 milliGRAM(s) Oral daily  aspirin enteric coated 325 milliGRAM(s) Oral daily  atorvastatin 80 milliGRAM(s) Oral at bedtime  dextrose 40% Gel 15 Gram(s) Oral once  dextrose 50% Injectable 25 Gram(s) IV Push once  dextrose 50% Injectable 12.5 Gram(s) IV Push once  dextrose 50% Injectable 25 Gram(s) IV Push once  famotidine    Tablet 20 milliGRAM(s) Oral daily  heparin   Injectable 5000 Unit(s) SubCutaneous every 8 hours  insulin glargine Injectable (LANTUS) 28 Unit(s) SubCutaneous at bedtime  insulin lispro (ADMELOG) corrective regimen sliding scale   SubCutaneous three times a day before meals  insulin lispro (ADMELOG) corrective regimen sliding scale   SubCutaneous at bedtime  insulin lispro Injectable (ADMELOG) 8 Unit(s) SubCutaneous three times a day before meals  metoprolol tartrate 50 milliGRAM(s) Oral every 8 hours  polyethylene glycol 3350 17 Gram(s) Oral daily  sodium chloride 0.9% lock flush 3 milliLiter(s) IV Push every 8 hours  tamsulosin 0.4 milliGRAM(s) Oral at bedtime      Allergies    No Known Allergies    Intolerances        SOCIAL HISTORY:  Denies alcohol abuse, drug abuse or tobacco usage.     FAMILY HISTORY:  No pertinent family history in first degree relatives    No pertinent family history in first degree relatives        VITALS:  T(C): 37 (12-21-20 @ 12:00), Max: 37.6 (12-21-20 @ 08:00)  HR: 82 (12-21-20 @ 12:24) (77 - 90)  BP: 129/80 (12-21-20 @ 12:24) (117/56 - 144/76)  RR: 18 (12-21-20 @ 12:24) (13 - 38)  SpO2: 94% (12-21-20 @ 12:24) (92% - 97%)    REVIEW OF SYSTEMS:  Denies any nausea, vomiting, diarrhea, fever or chills. Denies chest pain, SOB, focal weakness, hematuria or dysuria. Good oral intake and denies fatigue or weakness. All other pertinent systems are reviewed and are negative.    PHYSICAL EXAM:  Constitutional: NAD  HEENT: EOMI  Neck:  No JVD, supple   Respiratory: CTA B/L  Cardiovascular: S1 and S2, RRR  Gastrointestinal: + BS, soft, NT, ND  Extremities: No peripheral edema, + peripheral pulses  Neurological: A/O x 3, CN2-12 intact  Psychiatric: Normal mood, normal affect  : No Flores  Skin: No rashes, C/D/I  Access: Not applicable    I and O's:    12-19 @ 07:01  -  12-20 @ 07:00  --------------------------------------------------------  IN: 1050 mL / OUT: 3635 mL / NET: -2585 mL    12-20 @ 07:01  -  12-21 @ 07:00  --------------------------------------------------------  IN: 960 mL / OUT: 2200 mL / NET: -1240 mL    12-21 @ 07:01  -  12-21 @ 16:57  --------------------------------------------------------  IN: 490 mL / OUT: 900 mL / NET: -410 mL          LABS:                        9.7    7.05  )-----------( 133      ( 21 Dec 2020 00:38 )             30.1     12-21    142  |  104  |  37<H>  ----------------------------<  119<H>  4.0   |  27  |  1.69<H>    Ca    9.1      21 Dec 2020 00:38  Phos  2.5     12-21  Mg     2.4     12-21    TPro  5.9<L>  /  Alb  3.6  /  TBili  0.7  /  DBili  x   /  AST  17  /  ALT  18  /  AlkPhos  68  12-21      URINE:      RADIOLOGY & ADDITIONAL STUDIES:     < from: Xray Chest 1 View- PORTABLE-Routine (Xray Chest 1 View- PORTABLE-Routine in AM.) (12.21.20 @ 03:07) >    EXAM:  XR CHEST PORTABLE ROUTINE 1V                            PROCEDURE DATE:  12/21/2020            INTERPRETATION:  A single chest x-ray was obtained on December 21, 2020.    Indication: Postop.    Impression:    The heart is enlarged. Left lower lobe pneumonia and/or atelectasis. The right lung is clear. The right Cordis sheath catheter was removed. No pneumothorax. Status post sternotomy.                OPAL DEAN MD; Attending Radiologist  This document has been electronically signed. Dec 21 2020  8:49AM    < end of copied text >   NEPHROLOGY - [facility]    Patient seen and examined.    HPI:  History of Present Illness:    74 yo Male poor historian with PMHx of HTN, HLD, DM2, BPH and OA who presented to [facility] today for an elective cardiac cath.  Patient was seen and evaluated by Cardiologist Dr. Horvath for routine visit and patient was fount to have symptoms of CP with WINN and SOB x 5 months. Cardiac cath finding revealed significant multivessel CAD.  Preliminary cath finding:  EF 55%; LM Distal 50%, LAD Prox 50%Lcx Ostial 80%, OM closed 100%, RCA Closed 100%. Patient transferred to [facility] for cardiac surgery evaluation with Dr. Hairston. At this time patient reports asymptomatic denies CP, palpitation, SOB, WINN, HA, dizziness, N/V/D, fever or chills.  No acute event noted overnight.   Pt is sp CABG on the 17 and the creatinine went up and renal consult was obtained   There is no hematuria or bubbles in the urine.  No history of NSAIDS or nephrolithisis.  The patient urinates once or twice in the night and there is no incontinence.  No family hx or renal disease or back pain.    No recent abx use.  No alleviating or aggravating factors with respect to the kidneys.   He has DM for 8 yr and no retinopathy.  HTN for a little bit longer and no CVA.         (14 Dec 2020 17:39)      PAST MEDICAL & SURGICAL HISTORY:  Aneurysm of right leg    BPH (benign prostatic hyperplasia)    Osteoarthritis    Type 2 diabetes mellitus    HLD (hyperlipidemia)    HTN (hypertension)    CAD in native artery    BPH (benign prostatic hyperplasia)    OA (osteoarthritis)    DM (diabetes mellitus)    HLD (hyperlipidemia)    HTN (hypertension)    S/P appendectomy    H/O left inguinal hernia repair    S/P excision of ganglion cyst    S/P left inguinal hernia repair    S/P appendectomy        MEDICATIONS  (STANDING):  amLODIPine   Tablet 10 milliGRAM(s) Oral daily  aspirin enteric coated 325 milliGRAM(s) Oral daily  atorvastatin 80 milliGRAM(s) Oral at bedtime  dextrose 40% Gel 15 Gram(s) Oral once  dextrose 50% Injectable 25 Gram(s) IV Push once  dextrose 50% Injectable 12.5 Gram(s) IV Push once  dextrose 50% Injectable 25 Gram(s) IV Push once  famotidine    Tablet 20 milliGRAM(s) Oral daily  heparin   Injectable 5000 Unit(s) SubCutaneous every 8 hours  insulin glargine Injectable (LANTUS) 28 Unit(s) SubCutaneous at bedtime  insulin lispro (ADMELOG) corrective regimen sliding scale   SubCutaneous three times a day before meals  insulin lispro (ADMELOG) corrective regimen sliding scale   SubCutaneous at bedtime  insulin lispro Injectable (ADMELOG) 8 Unit(s) SubCutaneous three times a day before meals  metoprolol tartrate 50 milliGRAM(s) Oral every 8 hours  polyethylene glycol 3350 17 Gram(s) Oral daily  sodium chloride 0.9% lock flush 3 milliLiter(s) IV Push every 8 hours  tamsulosin 0.4 milliGRAM(s) Oral at bedtime      Allergies    No Known Allergies    Intolerances        SOCIAL HISTORY:  Denies alcohol abuse, drug abuse or tobacco usage.     FAMILY HISTORY:  No pertinent family history in first degree relatives    No pertinent family history in first degree relatives        VITALS:  T(C): 37 (12-21-20 @ 12:00), Max: 37.6 (12-21-20 @ 08:00)  HR: 82 (12-21-20 @ 12:24) (77 - 90)  BP: 129/80 (12-21-20 @ 12:24) (117/56 - 144/76)  RR: 18 (12-21-20 @ 12:24) (13 - 38)  SpO2: 94% (12-21-20 @ 12:24) (92% - 97%)    REVIEW OF SYSTEMS:  Denies any nausea, vomiting, diarrhea, fever or chills.  All other pertinent systems are reviewed and are negative.    PHYSICAL EXAM:  Constitutional: NAD  HEENT: EOMI  Neck:  No JVD, supple   Respiratory: CTA B/L  Cardiovascular: S1 and S2, RRR  Gastrointestinal: + BS, soft, NT, ND  Extremities: No peripheral edema, + peripheral pulses  Neurological: A/O x 3, CN2-12 intact  Psychiatric: Normal mood, normal affect  : + [place]  Flores  Skin: No rashes, C/D/I  Access: Not applicable    I and O's:    12-19 @ 07:01  -  12-20 @ 07:00  --------------------------------------------------------  IN: 1050 mL / OUT: 3635 mL / NET: -2585 mL    12-20 @ 07:01  -  12-21 @ 07:00  --------------------------------------------------------  IN: 960 mL / OUT: 2200 mL / NET: -1240 mL    12-21 @ 07:01  -  12-21 @ 16:57  --------------------------------------------------------  IN: 490 mL / OUT: 900 mL / NET: -410 mL          LABS:                        9.7    7.05  )-----------( 133      ( 21 Dec 2020 00:38 )             30.1     12-21    142  |  104  |  37<H>  ----------------------------<  119<H>  4.0   |  27  |  1.69<H>    Ca    9.1      21 Dec 2020 00:38  Phos  2.5     12-21  Mg     2.4     12-21    TPro  5.9<L>  /  Alb  3.6  /  TBili  0.7  /  DBili  x   /  AST  17  /  ALT  18  /  AlkPhos  68  12-21      URINE:      RADIOLOGY & ADDITIONAL STUDIES:     < from: Xray Chest 1 View- PORTABLE-Routine (Xray Chest 1 View- PORTABLE-Routine in AM.) (12.21.20 @ 03:07) >    EXAM:  XR CHEST PORTABLE ROUTINE 1V                            PROCEDURE DATE:  12/21/2020            INTERPRETATION:  A single chest x-ray was obtained on December 21, 2020.    Indication: Postop.    Impression:    The heart is enlarged. Left lower lobe pneumonia and/or atelectasis. The right lung is clear. The right Cordis sheath catheter was removed. No pneumothorax. Status post sternotomy.                OPAL DEAN MD; Attending Radiologist  This document has been electronically signed. Dec 21 2020  8:49AM    < end of copied text >

## 2020-12-21 NOTE — DIETITIAN INITIAL EVALUATION ADULT. - PHYSCIAL ASSESSMENT
VACCINE ADMINISTRATION RECORD  PARENT / GUARDIAN APPROVAL  Date: 2020  Vaccine administered to: Binta Parekh     : 2021    MRN: [id number]    A copy of the appropriate Centers for Disease Control and Prevention Vaccine Information stateme
VACCINE ADMINISTRATION RECORD  PARENT / GUARDIAN APPROVAL  Date: 2020  Vaccine administered to: Evelyn Jorgitozahraa     : 2021    MRN: [id number]    A copy of the appropriate Centers for Disease Control and Prevention Vaccine Information stateme
well nourished

## 2020-12-21 NOTE — DIETITIAN INITIAL EVALUATION ADULT. - ORAL INTAKE PTA/DIET HISTORY
Pt reports a good PO intake PTA. Pt follows a low Na diet, limits sugar and fat.   Breakfast: skips  Lunch: Oatmeal, coffee, eggs  Dinner: Lamb, chicken, fish, vegetables  Drinks water, sometimes regular pepsi.

## 2020-12-21 NOTE — PROGRESS NOTE ADULT - SUBJECTIVE AND OBJECTIVE BOX
Chief complaint  Patient is a 75y old  Male who presents with a chief complaint of Cardiac Surgery (21 Dec 2020 08:28)   Review of systems  Patient awake in chair, well-appearing, no hypoglycemic episodes.    Labs and Fingersticks  CAPILLARY BLOOD GLUCOSE  147 (21 Dec 2020 12:00)  119 (21 Dec 2020 08:00)      POCT Blood Glucose.: 147 mg/dL (21 Dec 2020 11:40)  POCT Blood Glucose.: 119 mg/dL (21 Dec 2020 07:58)  POCT Blood Glucose.: 134 mg/dL (20 Dec 2020 21:44)      Anion Gap, Serum: 11 (12-21 @ 00:38)  Anion Gap, Serum: 11 (12-20 @ 00:27)      Calcium, Total Serum: 9.1 (12-21 @ 00:38)  Calcium, Total Serum: 9.0 (12-20 @ 00:27)  Albumin, Serum: 3.6 (12-21 @ 00:38)  Albumin, Serum: 4.0 (12-20 @ 00:27)    Alanine Aminotransferase (ALT/SGPT): 18 (12-21 @ 00:38)  Alanine Aminotransferase (ALT/SGPT): 17 (12-20 @ 00:27)  Alkaline Phosphatase, Serum: 68 (12-21 @ 00:38)  Alkaline Phosphatase, Serum: 68 (12-20 @ 00:27)  Aspartate Aminotransferase (AST/SGOT): 17 (12-21 @ 00:38)  Aspartate Aminotransferase (AST/SGOT): 21 (12-20 @ 00:27)        12-21    142  |  104  |  37<H>  ----------------------------<  119<H>  4.0   |  27  |  1.69<H>    Ca    9.1      21 Dec 2020 00:38  Phos  2.5     12-21  Mg     2.4     12-21    TPro  5.9<L>  /  Alb  3.6  /  TBili  0.7  /  DBili  x   /  AST  17  /  ALT  18  /  AlkPhos  68  12-21                        9.7    7.05  )-----------( 133      ( 21 Dec 2020 00:38 )             30.1     Medications  MEDICATIONS  (STANDING):  amLODIPine   Tablet 10 milliGRAM(s) Oral daily  aspirin enteric coated 325 milliGRAM(s) Oral daily  atorvastatin 80 milliGRAM(s) Oral at bedtime  dextrose 40% Gel 15 Gram(s) Oral once  dextrose 50% Injectable 25 Gram(s) IV Push once  dextrose 50% Injectable 12.5 Gram(s) IV Push once  dextrose 50% Injectable 25 Gram(s) IV Push once  famotidine    Tablet 20 milliGRAM(s) Oral daily  heparin   Injectable 5000 Unit(s) SubCutaneous every 8 hours  insulin glargine Injectable (LANTUS) 28 Unit(s) SubCutaneous at bedtime  insulin lispro (ADMELOG) corrective regimen sliding scale   SubCutaneous three times a day before meals  insulin lispro (ADMELOG) corrective regimen sliding scale   SubCutaneous at bedtime  insulin lispro Injectable (ADMELOG) 8 Unit(s) SubCutaneous three times a day before meals  metoprolol tartrate 50 milliGRAM(s) Oral every 8 hours  polyethylene glycol 3350 17 Gram(s) Oral daily  sodium chloride 0.9% lock flush 3 milliLiter(s) IV Push every 8 hours  tamsulosin 0.4 milliGRAM(s) Oral at bedtime      Physical Exam  General: Patient comfortable in bed  Vital Signs Last 12 Hrs  T(F): 98.6 (12-21-20 @ 12:00), Max: 99.6 (12-21-20 @ 08:00)  HR: 82 (12-21-20 @ 12:24) (77 - 88)  BP: 129/80 (12-21-20 @ 12:24) (117/56 - 144/76)  BP(mean): 100 (12-21-20 @ 11:00) (80 - 100)  RR: 18 (12-21-20 @ 12:24) (15 - 38)  SpO2: 94% (12-21-20 @ 12:24) (92% - 97%)  Neck: No palpable thyroid nodules.  CVS: S1S2, No murmurs  Respiratory: No wheezing, no crepitations  GI: Abdomen soft, bowel sounds positive  Musculoskeletal:  edema lower extremities.   Skin: No skin rashes, no ecchymosis    Diagnostics             Chief complaint  Patient is a 75y old  Male who presents with a chief complaint of Cardiac Surgery (21 Dec 2020 08:28)   Review of systems  Patient awake in chair, well-appearing, no hypoglycemic episodes.    Labs and Fingersticks  CAPILLARY BLOOD GLUCOSE  147 (21 Dec 2020 12:00)  119 (21 Dec 2020 08:00)      POCT Blood Glucose.: 147 mg/dL (21 Dec 2020 11:40)  POCT Blood Glucose.: 119 mg/dL (21 Dec 2020 07:58)  POCT Blood Glucose.: 134 mg/dL (20 Dec 2020 21:44)      Anion Gap, Serum: 11 (12-21 @ 00:38)  Anion Gap, Serum: 11 (12-20 @ 00:27)      Calcium, Total Serum: 9.1 (12-21 @ 00:38)  Calcium, Total Serum: 9.0 (12-20 @ 00:27)  Albumin, Serum: 3.6 (12-21 @ 00:38)  Albumin, Serum: 4.0 (12-20 @ 00:27)    Alanine Aminotransferase (ALT/SGPT): 18 (12-21 @ 00:38)  Alanine Aminotransferase (ALT/SGPT): 17 (12-20 @ 00:27)  Alkaline Phosphatase, Serum: 68 (12-21 @ 00:38)  Alkaline Phosphatase, Serum: 68 (12-20 @ 00:27)  Aspartate Aminotransferase (AST/SGOT): 17 (12-21 @ 00:38)  Aspartate Aminotransferase (AST/SGOT): 21 (12-20 @ 00:27)        12-21    142  |  104  |  37<H>  ----------------------------<  119<H>  4.0   |  27  |  1.69<H>    Ca    9.1      21 Dec 2020 00:38  Phos  2.5     12-21  Mg     2.4     12-21    TPro  5.9<L>  /  Alb  3.6  /  TBili  0.7  /  DBili  x   /  AST  17  /  ALT  18  /  AlkPhos  68  12-21                        9.7    7.05  )-----------( 133      ( 21 Dec 2020 00:38 )             30.1     Medications  MEDICATIONS  (STANDING):  amLODIPine   Tablet 10 milliGRAM(s) Oral daily  aspirin enteric coated 325 milliGRAM(s) Oral daily  atorvastatin 80 milliGRAM(s) Oral at bedtime  dextrose 40% Gel 15 Gram(s) Oral once  dextrose 50% Injectable 25 Gram(s) IV Push once  dextrose 50% Injectable 12.5 Gram(s) IV Push once  dextrose 50% Injectable 25 Gram(s) IV Push once  famotidine    Tablet 20 milliGRAM(s) Oral daily  heparin   Injectable 5000 Unit(s) SubCutaneous every 8 hours  insulin glargine Injectable (LANTUS) 28 Unit(s) SubCutaneous at bedtime  insulin lispro (ADMELOG) corrective regimen sliding scale   SubCutaneous three times a day before meals  insulin lispro (ADMELOG) corrective regimen sliding scale   SubCutaneous at bedtime  insulin lispro Injectable (ADMELOG) 8 Unit(s) SubCutaneous three times a day before meals  metoprolol tartrate 50 milliGRAM(s) Oral every 8 hours  polyethylene glycol 3350 17 Gram(s) Oral daily  sodium chloride 0.9% lock flush 3 milliLiter(s) IV Push every 8 hours  tamsulosin 0.4 milliGRAM(s) Oral at bedtime      Physical Exam  General: Patient comfortable in bed  Vital Signs Last 12 Hrs  T(F): 98.6 (12-21-20 @ 12:00), Max: 99.6 (12-21-20 @ 08:00)  HR: 82 (12-21-20 @ 12:24) (77 - 88)  BP: 129/80 (12-21-20 @ 12:24) (117/56 - 144/76)  BP(mean): 100 (12-21-20 @ 11:00) (80 - 100)  RR: 18 (12-21-20 @ 12:24) (15 - 38)  SpO2: 94% (12-21-20 @ 12:24) (92% - 97%)  Neck: No palpable thyroid nodules.  CVS: S1S2, No murmurs  Respiratory: No wheezing, no crepitations  GI: Abdomen soft, bowel sounds positive  Musculoskeletal:  edema lower extremities.   Skin: No skin rashes, no ecchymosis    Diagnostics

## 2020-12-21 NOTE — DIETITIAN INITIAL EVALUATION ADULT. - PERTINENT LABORATORY DATA
12-21 @ 00:38: Sodium 142, Potassium 4.0, Chloride 104, Calcium 9.1, Magnesium 2.4, Phosphorus 2.5, BUN 37<H>, Creatinine 1.69<H>, <H>, Alk Phos 68, ALT/SGPT 18, AST/SGOT 17, HbA1c --, Total Protein 5.9<L>, Albumin 3.6, Prealbumin --, Total Bilirubin 0.7, Direct Bilirubin --, Hemoglobin 9.7<L>, Hematocrit 30.1<L>

## 2020-12-21 NOTE — PROGRESS NOTE ADULT - SUBJECTIVE AND OBJECTIVE BOX
CRITICAL CARE ATTENDING - CTICU    MEDICATIONS  (STANDING):  amLODIPine   Tablet 10 milliGRAM(s) Oral daily  aspirin enteric coated 325 milliGRAM(s) Oral daily  atorvastatin 80 milliGRAM(s) Oral at bedtime  chlorhexidine 2% Cloths 1 Application(s) Topical <User Schedule>  dextrose 40% Gel 15 Gram(s) Oral once  dextrose 50% Injectable 25 Gram(s) IV Push once  dextrose 50% Injectable 12.5 Gram(s) IV Push once  dextrose 50% Injectable 25 Gram(s) IV Push once  famotidine    Tablet 20 milliGRAM(s) Oral two times a day  furosemide    Tablet 40 milliGRAM(s) Oral daily  heparin   Injectable 5000 Unit(s) SubCutaneous every 8 hours  insulin glargine Injectable (LANTUS) 28 Unit(s) SubCutaneous at bedtime  insulin lispro (ADMELOG) corrective regimen sliding scale   SubCutaneous three times a day before meals  insulin lispro (ADMELOG) corrective regimen sliding scale   SubCutaneous at bedtime  insulin lispro Injectable (ADMELOG) 8 Unit(s) SubCutaneous three times a day before meals  metoprolol tartrate 50 milliGRAM(s) Oral every 8 hours  polyethylene glycol 3350 17 Gram(s) Oral daily  sodium chloride 0.9%. 1000 milliLiter(s) (10 mL/Hr) IV Continuous <Continuous>  tamsulosin 0.4 milliGRAM(s) Oral at bedtime                                    9.7    7.05  )-----------( 133      ( 21 Dec 2020 00:38 )             30.1       12-    142  |  104  |  37<H>  ----------------------------<  119<H>  4.0   |  27  |  1.69<H>    Ca    9.1      21 Dec 2020 00:38  Phos  2.5     12-  Mg     2.4     12    TPro  5.9<L>  /  Alb  3.6  /  TBili  0.7  /  DBili  x   /  AST  17  /  ALT  18  /  AlkPhos  68  12-              Daily     Daily Weight in k.6 (21 Dec 2020 01:00)      12-20 @ 07:01  -   @ 07:00  --------------------------------------------------------  IN: 960 mL / OUT: 2200 mL / NET: -1240 mL        Critically Ill patient  : [ ] preoperative ,   [x ] post operative    Requires :  [x ] Arterial Line   [ x] Central Line  [ ] PA catheter  [ ] IABP  [ ] ECMO  [ ] LVAD  [ ] Ventilator  [ x] pacemaker [ ] Impella.                      [x ] ABG's     [x ] Pulse Oxymetry Monitoring  Bedside evaluation , monitoring , treatment of hemodynamics , fluids , IVP/ IVCD meds.        Diagnosis:       POD 4 - CABG x3     Chest tube drainage     Requires chest PT, pulmonary toilet, suctioning to maintain SaO2,  patent airway and treat atelectasis.     Temporary pacemaker (TPM) interrogation and setting.     Requires bedside physical therapy, mobilization and total half-way care.     Fluid overload     Renal Failure - Acute Kidney Injury     Thrombocytopenia             By signing my name below, I, Jessica Garcia, attest that this documentation has been prepared under the direction and in the presence of Jeovanny Upton MD.   Electronically Signed: Maria Elena Puri. 20 @ 07:11    Discussed with CT surgeon, Physician's Assistant - Nurse Practitioner- Critical care medicine team.   Dicussed at  AM / PM rounds.   Chart, labs , films reviewed.    Total Time:  CRITICAL CARE ATTENDING - CTICU    MEDICATIONS  (STANDING):  amLODIPine   Tablet 10 milliGRAM(s) Oral daily  aspirin enteric coated 325 milliGRAM(s) Oral daily  atorvastatin 80 milliGRAM(s) Oral at bedtime  chlorhexidine 2% Cloths 1 Application(s) Topical <User Schedule>  dextrose 40% Gel 15 Gram(s) Oral once  dextrose 50% Injectable 25 Gram(s) IV Push once  dextrose 50% Injectable 12.5 Gram(s) IV Push once  dextrose 50% Injectable 25 Gram(s) IV Push once  famotidine    Tablet 20 milliGRAM(s) Oral two times a day  furosemide    Tablet 40 milliGRAM(s) Oral daily  heparin   Injectable 5000 Unit(s) SubCutaneous every 8 hours  insulin glargine Injectable (LANTUS) 28 Unit(s) SubCutaneous at bedtime  insulin lispro (ADMELOG) corrective regimen sliding scale   SubCutaneous three times a day before meals  insulin lispro (ADMELOG) corrective regimen sliding scale   SubCutaneous at bedtime  insulin lispro Injectable (ADMELOG) 8 Unit(s) SubCutaneous three times a day before meals  metoprolol tartrate 50 milliGRAM(s) Oral every 8 hours  polyethylene glycol 3350 17 Gram(s) Oral daily  sodium chloride 0.9%. 1000 milliLiter(s) (10 mL/Hr) IV Continuous <Continuous>  tamsulosin 0.4 milliGRAM(s) Oral at bedtime                                    9.7    7.05  )-----------( 133      ( 21 Dec 2020 00:38 )             30.1       12-    142  |  104  |  37<H>  ----------------------------<  119<H>  4.0   |  27  |  1.69<H>    Ca    9.1      21 Dec 2020 00:38  Phos  2.5     12-  Mg     2.4     12    TPro  5.9<L>  /  Alb  3.6  /  TBili  0.7  /  DBili  x   /  AST  17  /  ALT  18  /  AlkPhos  68  12-              Daily     Daily Weight in k.6 (21 Dec 2020 01:00)      12-20 @ 07:01  -   @ 07:00  --------------------------------------------------------  IN: 960 mL / OUT: 2200 mL / NET: -1240 mL        Critically Ill patient  : [ ] preoperative ,   [x ] post operative    Requires :  [x ] Arterial Line   [ ] Central Line  [ ] PA catheter  [ ] IABP  [ ] ECMO  [ ] LVAD  [ ] Ventilator  [ x] pacemaker [ ] Impella.                      [x ] ABG's     [x ] Pulse Oxymetry Monitoring  Bedside evaluation , monitoring , treatment of hemodynamics , fluids , IVP/ IVCD meds.        Diagnosis:       POD 4 - CABG x 4 L    Chest tube drainage     Requires chest PT, pulmonary toilet, suctioning to maintain SaO2,  patent airway and treat atelectasis.     Temporary pacemaker (TPM) interrogation and setting.     Requires bedside physical therapy, mobilization and total USP care.     Fluid overload     Renal Failure - Acute Kidney Injury     Thrombocytopenia     Hypoxia - resolved    HF O2 - to NC    DM 2 - IVCD Insulin changed to LANTUS + Admelog    ASA / Lopressor             By signing my name below, IJessica, attest that this documentation has been prepared under the direction and in the presence of Jeovanny Upton MD.   Electronically Signed: Maria Elena Puri. 20 @ 07:11    I, Jeovanny Upton, personally performed the services described in this documentation. All medical record entries made by the scribe were at my direction and in my presence. I have reviewed the chart and agree that the record reflects my personal performance and is accurate and complete.   Jeovanny Upton MD.     Discussed with CT surgeon, Physician's Assistant - Nurse Practitioner- Critical care medicine team.   Dicussed at  AM / PM rounds.   Chart, labs , films reviewed.    Total Time: 20 min

## 2020-12-21 NOTE — DIETITIAN INITIAL EVALUATION ADULT. - CHIEF COMPLAINT
75 year old female with HTN, HLD, T2DM, s/p cardiac cath with TVD. Pt now POD 4 from CABG and extubated.

## 2020-12-21 NOTE — DIETITIAN INITIAL EVALUATION ADULT. - OTHER INFO
Pt reports feeling well, seen with Breakfast tray 100% consumed thus far.   Pt reports a good PO intake since OR.     Pt reports a UBW of 185lbs and denies any recent weight changes PTA. Pt was admitted at 184.3lbs indicating weight stability. Pt currently 193lbs but this weight gain likely related to intraoperative fluid shifts.   Pt was amenable to diet education regarding increased nutrient needs to promote post op wound healing. Reviewed protein rich foods and encouraged intake.   Pt with T2DM, takes metformin. Does not check finger sticks PTA.     T2DM reviewed and BG control encouraged to promote post op wound healing. Heart healthy diet education reviewed. Pt verbalized understanding.   Pt aware RD remains available for on going diet education.     Pt denies GI distress, chewing/swallowing difficulty, takes Vit D. NKFA.

## 2020-12-21 NOTE — PROGRESS NOTE ADULT - ASSESSMENT
Assessment  DMT2: 75y Male with DM T2 with hyperglycemia admitted with chest pain, s/p CABG, on basal bolus insulin, blood sugars improved and trending within acceptable range, no hypoglycemic episodes, eating meals, well-appearing, stepped down from CTU.  CAD: S/P CABG, on medications, monitored, stable.  HLD: On statin.        Alfa May MD  Cell: [phone]  Office: [phone]           Assessment  DMT2: 75y Male with DM T2 with hyperglycemia admitted with chest pain, s/p CABG, on basal bolus insulin, blood sugars improved and trending within acceptable range, no  hypoglycemic episodes, eating meals, well-appearing, stepped down from CTU.  CAD: S/P CABG, on medications, monitored, stable.  HLD: On statin.        Alfa May MD  Cell: [phone]  Office: [phone]

## 2020-12-21 NOTE — PROGRESS NOTE ADULT - SUBJECTIVE AND OBJECTIVE BOX
Cardiovascular Disease Progress Note    Overnight events: No acute events overnight.  no cp/sob/palps/dizziness  Otherwise review of systems negative    Objective Findings:  T(C): 36.4 (20 @ 04:00), Max: 37.3 (20 @ 00:00)  HR: 79 (20 @ 07:00) (77 - 103)  BP: 125/69 (20 @ 07:00) (120/88 - 129/67)  RR: 21 (20 @ 07:00) (12 - 31)  SpO2: 92% (20 @ 07:00) (88% - 97%)  Wt(kg): --  Daily     Daily Weight in k.6 (21 Dec 2020 01:00)      Physical Exam:  Gen: NAD  HEENT: EOMI  CV: RRR, normal S1 + S2, no m/r/g  Lungs: CTAB  Abd: soft, non-tender  Ext: No edema    Telemetry: nsr    Laboratory Data:                        9.7    7.05  )-----------( 133      ( 21 Dec 2020 00:38 )             30.1         142  |  104  |  37<H>  ----------------------------<  119<H>  4.0   |  27  |  1.69<H>    Ca    9.1      21 Dec 2020 00:38  Phos  2.5       Mg     2.4         TPro  5.9<L>  /  Alb  3.6  /  TBili  0.7  /  DBili  x   /  AST  17  /  ALT  18  /  AlkPhos  68                Inpatient Medications:  MEDICATIONS  (STANDING):  amLODIPine   Tablet 10 milliGRAM(s) Oral daily  aspirin enteric coated 325 milliGRAM(s) Oral daily  atorvastatin 80 milliGRAM(s) Oral at bedtime  chlorhexidine 2% Cloths 1 Application(s) Topical <User Schedule>  dextrose 40% Gel 15 Gram(s) Oral once  dextrose 50% Injectable 25 Gram(s) IV Push once  dextrose 50% Injectable 12.5 Gram(s) IV Push once  dextrose 50% Injectable 25 Gram(s) IV Push once  famotidine    Tablet 20 milliGRAM(s) Oral two times a day  furosemide    Tablet 40 milliGRAM(s) Oral daily  heparin   Injectable 5000 Unit(s) SubCutaneous every 8 hours  insulin glargine Injectable (LANTUS) 28 Unit(s) SubCutaneous at bedtime  insulin lispro (ADMELOG) corrective regimen sliding scale   SubCutaneous three times a day before meals  insulin lispro (ADMELOG) corrective regimen sliding scale   SubCutaneous at bedtime  insulin lispro Injectable (ADMELOG) 8 Unit(s) SubCutaneous three times a day before meals  metoprolol tartrate 50 milliGRAM(s) Oral every 8 hours  polyethylene glycol 3350 17 Gram(s) Oral daily  sodium chloride 0.9%. 1000 milliLiter(s) (10 mL/Hr) IV Continuous <Continuous>  tamsulosin 0.4 milliGRAM(s) Oral at bedtime      Assessment:  -angina s/p lhc c/w mvd EF 55%; LM Distal 50%, LAD Prox 50%Lcx Ostial 80%, OM closed 100%, RCA Closed 100%. P  -DM  -HTN    Recs:  s/p 3v cabg with dr rodarte   diuresis prn to maintain euvolemic  cw asa statin and beta blockers  tele monitoring  bp control  michael --> ? overdiuresis vs hemodynamically mediated vs athero. consider renal consult        Over 25 minutes spent on total encounter; more than 50% of the visit was spent counseling and/or coordinating care by the attending physician.      Singh Hill MD   Cardiovascular Disease  [phone]

## 2020-12-21 NOTE — PROGRESS NOTE ADULT - ASSESSMENT
74 yo Male poor historian with PMHx of HTN, HLD, DM2, BPH and OA   12/17 CABG x3  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  Hyperglycemia  Diabetes Mellitus Type 2  HLD (hyperlipidemia)  HTN (hypertension)  BPH  Thrombocytopenia   74 yo Male poor historian with PMHx of HTN, HLD, DM2, BPH and OA    S/P CABG x 3 12/17/20  post op  worsening TOMMIE> renal consulted  12/21 Tx 2 [place]

## 2020-12-21 NOTE — PROGRESS NOTE ADULT - SUBJECTIVE AND OBJECTIVE BOX
Subjective:      Tele:             V/S:                    T(F): 98.6 (20 @ 12:00), Max: 99.6 (20 @ 08:00)  HR: 82 (20 @ 12:24) (77 - 90)  BP: 129/80 (20 @ 12:24) (117/56 - 144/76)  RR: 18 (20 @ 12:24) (12 - 38)  SpO2: 94% (20 @ 12:24) (90% - 97%)  Wt(kg): --      LV EF:      Labs:      142  |  104  |  37<H>  ----------------------------<  119<H>  4.0   |  27  |  1.69<H>    Ca    9.1      21 Dec 2020 00:38  Phos  2.5       Mg     2.4         TPro  5.9<L>  /  Alb  3.6  /  TBili  0.7  /  DBili  x   /  AST  17  /  ALT  18  /  AlkPhos  68                                 9.7    7.05  )-----------( 133      ( 21 Dec 2020 00:38 )             30.1             CAPILLARY BLOOD GLUCOSE  147 (21 Dec 2020 12:00)  119 (21 Dec 2020 08:00)      POCT Blood Glucose.: 147 mg/dL (21 Dec 2020 11:40)  POCT Blood Glucose.: 119 mg/dL (21 Dec 2020 07:58)  POCT Blood Glucose.: 134 mg/dL (20 Dec 2020 21:44)           CXR:    I&O's Detail    20 Dec 2020 07:01  -  21 Dec 2020 07:00  --------------------------------------------------------  IN:    Oral Fluid: 960 mL  Total IN: 960 mL    OUT:    sodium chloride 0.9%: 0 mL    Voided (mL): 2200 mL  Total OUT: 2200 mL    Total NET: -1240 mL      21 Dec 2020 07:01  -  21 Dec 2020 15:08  --------------------------------------------------------  IN:    Albumin 5%  - 250 mL: 250 mL    Oral Fluid: 240 mL  Total IN: 490 mL    OUT:    sodium chloride 0.9%: 0 mL    Voided (mL): 900 mL  Total OUT: 900 mL    Total NET: -410 mL          CHEST TUBE:  [ ] YES [ ] NO  OUTPUT:     per 24 hours    AIR LEAKS:  [ ] YES [ ] NO      MO DRAIN:   [ ] YES [ ] NO  OUTPUT:     per 24 hours    EPICARDIAL WIRES:  [ ] YES [ ] NO      BOWEL MOVEMENT:  [ ] YES [ ] NO      Daily     Daily Weight in k.6 (21 Dec 2020 01:00)  Medications:  amLODIPine   Tablet 10 milliGRAM(s) Oral daily  aspirin enteric coated 325 milliGRAM(s) Oral daily  atorvastatin 80 milliGRAM(s) Oral at bedtime  dextrose 40% Gel 15 Gram(s) Oral once  dextrose 50% Injectable 25 Gram(s) IV Push once  dextrose 50% Injectable 12.5 Gram(s) IV Push once  dextrose 50% Injectable 25 Gram(s) IV Push once  famotidine    Tablet 20 milliGRAM(s) Oral daily  heparin   Injectable 5000 Unit(s) SubCutaneous every 8 hours  insulin glargine Injectable (LANTUS) 28 Unit(s) SubCutaneous at bedtime  insulin lispro (ADMELOG) corrective regimen sliding scale   SubCutaneous three times a day before meals  insulin lispro (ADMELOG) corrective regimen sliding scale   SubCutaneous at bedtime  insulin lispro Injectable (ADMELOG) 8 Unit(s) SubCutaneous three times a day before meals  metoprolol tartrate 50 milliGRAM(s) Oral every 8 hours  oxycodone    5 mG/acetaminophen 325 mG 1 Tablet(s) Oral every 6 hours PRN  oxycodone    5 mG/acetaminophen 325 mG 2 Tablet(s) Oral every 6 hours PRN  polyethylene glycol 3350 17 Gram(s) Oral daily  sodium chloride 0.9% lock flush 3 milliLiter(s) IV Push every 8 hours  tamsulosin 0.4 milliGRAM(s) Oral at bedtime        Physical Exam:    Neuro:     Pulm:     CV:    Abd:     Extremities:    Incision(s):                  PAST MEDICAL & SURGICAL HISTORY:  Aneurysm of right leg    BPH (benign prostatic hyperplasia)    Osteoarthritis    Type 2 diabetes mellitus    HLD (hyperlipidemia)    HTN (hypertension)    CAD in native artery    BPH (benign prostatic hyperplasia)    OA (osteoarthritis)    DM (diabetes mellitus)    HLD (hyperlipidemia)    HTN (hypertension)    S/P appendectomy    H/O left inguinal hernia repair    S/P excision of ganglion cyst    S/P left inguinal hernia repair    S/P appendectomy               Subjective:  "I feel good, the pain is not bad at all"  OOB chair, walked w/ RW and staff 100 ft      Tele:  SR  80s           V/S:                    T(F): 98.6 (20 @ 12:00), Max: 99.6 (20 @ 08:00)  HR: 82 (20 @ 12:24) (77 - 90)  BP: 129/80 (20 @ 12:24) (117/56 - 144/76)  RR: 18 (20 @ 12:24) (12 - 38)  SpO2: 94% (20 @ 12:24) (90% - 97%)  Wt(kg): --      LV EF:      Labs:      142  |  104  |  37<H>  ----------------------------<  119<H>  4.0   |  27  |  1.69<H>    Ca    9.1      21 Dec 2020 00:38  Phos  2.5       Mg     2.4         TPro  5.9<L>  /  Alb  3.6  /  TBili  0.7  /  DBili  x   /  AST  17  /  ALT  18  /  AlkPhos  68                                 9.7    7.05  )-----------( 133      ( 21 Dec 2020 00:38 )             30.1             CAPILLARY BLOOD GLUCOSE  147 (21 Dec 2020 12:00)  119 (21 Dec 2020 08:00)      POCT Blood Glucose.: 147 mg/dL (21 Dec 2020 11:40)  POCT Blood Glucose.: 119 mg/dL (21 Dec 2020 07:58)  POCT Blood Glucose.: 134 mg/dL (20 Dec 2020 21:44)           CXR:    I&O's Detail    20 Dec 2020 07:01  -  21 Dec 2020 07:00  --------------------------------------------------------  IN:    Oral Fluid: 960 mL  Total IN: 960 mL    OUT:    sodium chloride 0.9%: 0 mL    Voided (mL): 2200 mL  Total OUT: 2200 mL    Total NET: -1240 mL      21 Dec 2020 07:01  -  21 Dec 2020 15:08  --------------------------------------------------------  IN:    Albumin 5%  - 250 mL: 250 mL    Oral Fluid: 240 mL  Total IN: 490 mL    OUT:    sodium chloride 0.9%: 0 mL    Voided (mL): 900 mL  Total OUT: 900 mL    Total NET: -410 mL    EPICARDIAL WIRES:  [x ] YES [ ] NO      BOWEL MOVEMENT:  [ ] YES [ x] NO      Daily     Daily Weight in k.6 (21 Dec 2020 01:00)  Medications:  amLODIPine   Tablet 10 milliGRAM(s) Oral daily  aspirin enteric coated 325 milliGRAM(s) Oral daily  atorvastatin 80 milliGRAM(s) Oral at bedtime  dextrose 40% Gel 15 Gram(s) Oral once  dextrose 50% Injectable 25 Gram(s) IV Push once  dextrose 50% Injectable 12.5 Gram(s) IV Push once  dextrose 50% Injectable 25 Gram(s) IV Push once  famotidine    Tablet 20 milliGRAM(s) Oral daily  heparin   Injectable 5000 Unit(s) SubCutaneous every 8 hours  insulin glargine Injectable (LANTUS) 28 Unit(s) SubCutaneous at bedtime  insulin lispro (ADMELOG) corrective regimen sliding scale   SubCutaneous three times a day before meals  insulin lispro (ADMELOG) corrective regimen sliding scale   SubCutaneous at bedtime  insulin lispro Injectable (ADMELOG) 8 Unit(s) SubCutaneous three times a day before meals  metoprolol tartrate 50 milliGRAM(s) Oral every 8 hours  oxycodone    5 mG/acetaminophen 325 mG 1 Tablet(s) Oral every 6 hours PRN  oxycodone    5 mG/acetaminophen 325 mG 2 Tablet(s) Oral every 6 hours PRN  polyethylene glycol 3350 17 Gram(s) Oral daily  sodium chloride 0.9% lock flush 3 milliLiter(s) IV Push every 8 hours  tamsulosin 0.4 milliGRAM(s) Oral at bedtime        Physical Exam:    Gen:  Awake, alert     RES : clear , no wheezing                         CVS: Regular  rhythm. Normal S1/S2  +  pacing wires    Abd: Soft, non-distended. Bowel sounds present.    Ext:  no edema, LLE  EVH site DSD  ACE wrapped     condom cath ksenia urine               PAST MEDICAL & SURGICAL HISTORY:  Aneurysm of right leg    BPH (benign prostatic hyperplasia)    Osteoarthritis    Type 2 diabetes mellitus    HLD (hyperlipidemia)    HTN (hypertension)    CAD in native artery    BPH (benign prostatic hyperplasia)    OA (osteoarthritis)    DM (diabetes mellitus)    HLD (hyperlipidemia)    HTN (hypertension)    S/P appendectomy    H/O left inguinal hernia repair    S/P excision of ganglion cyst    S/P left inguinal hernia repair    S/P appendectomy

## 2020-12-21 NOTE — CONSULT NOTE ADULT - ASSESSMENT
76 yo Male poor historian with PMHx of HTN, HLD, DM2, BPH sp cabg and now TOMMIE  POD # 4 and now TOMMIE.  TOMMIE based on JARRED criteria and likely ATN    1 Renal - Bladder scan now;       74 yo Male poor historian with PMHx of HTN, HLD, DM2, BPH sp cabg and now TOMMIE  POD # 4 and now TOMMIE.  TOMMIE based on JARRED criteria and likely ATN.    Likely hemodynamic from surgery.  Has an enlarged prostate so need to rule out obstruction     1 Renal - Bladder scan now;    Otherwise simple supportive care.  No need for diuretics or IVF    2CVS-BP is stable and as outpt will entertain ACE or ARB    3 Misc- Ambulate  [facility] in      [facility]   [phone]

## 2020-12-21 NOTE — DIETITIAN INITIAL EVALUATION ADULT. - ADD RECOMMEND
1. Provide food preferences as requested by Pt/family within diet restrictions  2. Encourage PO intake during meal times 3. Reviewed menu ordering procedures 4. 1. Provide food preferences as requested by Pt/family within diet restrictions  2. Encourage PO intake during meal times 3. Reviewed menu ordering procedures 4. Gave T2DM and heart healthy nutrition therapy.

## 2020-12-22 ENCOUNTER — TRANSCRIPTION ENCOUNTER (OUTPATIENT)
Age: 75
End: 2020-12-22

## 2020-12-22 VITALS
RESPIRATION RATE: 18 BRPM | TEMPERATURE: 98 F | SYSTOLIC BLOOD PRESSURE: 134 MMHG | OXYGEN SATURATION: 93 % | DIASTOLIC BLOOD PRESSURE: 84 MMHG | HEART RATE: 91 BPM

## 2020-12-22 LAB
ANION GAP SERPL CALC-SCNC: 12 MMOL/L — SIGNIFICANT CHANGE UP (ref 5–17)
BASOPHILS # BLD AUTO: 0.04 K/UL — SIGNIFICANT CHANGE UP (ref 0–0.2)
BASOPHILS NFR BLD AUTO: 0.7 % — SIGNIFICANT CHANGE UP (ref 0–2)
BUN SERPL-MCNC: 40 MG/DL — HIGH (ref 7–23)
CALCIUM SERPL-MCNC: 9.2 MG/DL — SIGNIFICANT CHANGE UP (ref 8.4–10.5)
CHLORIDE SERPL-SCNC: 106 MMOL/L — SIGNIFICANT CHANGE UP (ref 96–108)
CO2 SERPL-SCNC: 24 MMOL/L — SIGNIFICANT CHANGE UP (ref 22–31)
CREAT SERPL-MCNC: 1.33 MG/DL — HIGH (ref 0.5–1.3)
EOSINOPHIL # BLD AUTO: 0.33 K/UL — SIGNIFICANT CHANGE UP (ref 0–0.5)
EOSINOPHIL NFR BLD AUTO: 6.2 % — HIGH (ref 0–6)
GLUCOSE BLDC GLUCOMTR-MCNC: 119 MG/DL — HIGH (ref 70–99)
GLUCOSE BLDC GLUCOMTR-MCNC: 132 MG/DL — HIGH (ref 70–99)
GLUCOSE BLDC GLUCOMTR-MCNC: 180 MG/DL — HIGH (ref 70–99)
GLUCOSE BLDC GLUCOMTR-MCNC: 400 MG/DL — HIGH (ref 70–99)
GLUCOSE BLDC GLUCOMTR-MCNC: 90 MG/DL — SIGNIFICANT CHANGE UP (ref 70–99)
GLUCOSE SERPL-MCNC: 133 MG/DL — HIGH (ref 70–99)
HCT VFR BLD CALC: 31.6 % — LOW (ref 39–50)
HGB BLD-MCNC: 9.9 G/DL — LOW (ref 13–17)
IMM GRANULOCYTES NFR BLD AUTO: 0.9 % — SIGNIFICANT CHANGE UP (ref 0–1.5)
LYMPHOCYTES # BLD AUTO: 1.48 K/UL — SIGNIFICANT CHANGE UP (ref 1–3.3)
LYMPHOCYTES # BLD AUTO: 27.7 % — SIGNIFICANT CHANGE UP (ref 13–44)
MCHC RBC-ENTMCNC: 29.5 PG — SIGNIFICANT CHANGE UP (ref 27–34)
MCHC RBC-ENTMCNC: 31.3 GM/DL — LOW (ref 32–36)
MCV RBC AUTO: 94 FL — SIGNIFICANT CHANGE UP (ref 80–100)
MONOCYTES # BLD AUTO: 0.53 K/UL — SIGNIFICANT CHANGE UP (ref 0–0.9)
MONOCYTES NFR BLD AUTO: 9.9 % — SIGNIFICANT CHANGE UP (ref 2–14)
NEUTROPHILS # BLD AUTO: 2.91 K/UL — SIGNIFICANT CHANGE UP (ref 1.8–7.4)
NEUTROPHILS NFR BLD AUTO: 54.6 % — SIGNIFICANT CHANGE UP (ref 43–77)
NRBC # BLD: 0 /100 WBCS — SIGNIFICANT CHANGE UP (ref 0–0)
PLATELET # BLD AUTO: 154 K/UL — SIGNIFICANT CHANGE UP (ref 150–400)
POTASSIUM SERPL-MCNC: 4.4 MMOL/L — SIGNIFICANT CHANGE UP (ref 3.5–5.3)
POTASSIUM SERPL-SCNC: 4.4 MMOL/L — SIGNIFICANT CHANGE UP (ref 3.5–5.3)
RBC # BLD: 3.36 M/UL — LOW (ref 4.2–5.8)
RBC # FLD: 13.2 % — SIGNIFICANT CHANGE UP (ref 10.3–14.5)
SODIUM SERPL-SCNC: 142 MMOL/L — SIGNIFICANT CHANGE UP (ref 135–145)
WBC # BLD: 5.34 K/UL — SIGNIFICANT CHANGE UP (ref 3.8–10.5)
WBC # FLD AUTO: 5.34 K/UL — SIGNIFICANT CHANGE UP (ref 3.8–10.5)

## 2020-12-22 PROCEDURE — 94002 VENT MGMT INPAT INIT DAY: CPT

## 2020-12-22 PROCEDURE — 84436 ASSAY OF TOTAL THYROXINE: CPT

## 2020-12-22 PROCEDURE — 82803 BLOOD GASES ANY COMBINATION: CPT

## 2020-12-22 PROCEDURE — 84480 ASSAY TRIIODOTHYRONINE (T3): CPT

## 2020-12-22 PROCEDURE — 83880 ASSAY OF NATRIURETIC PEPTIDE: CPT

## 2020-12-22 PROCEDURE — 84443 ASSAY THYROID STIM HORMONE: CPT

## 2020-12-22 PROCEDURE — 80048 BASIC METABOLIC PNL TOTAL CA: CPT

## 2020-12-22 PROCEDURE — 82330 ASSAY OF CALCIUM: CPT

## 2020-12-22 PROCEDURE — 86923 COMPATIBILITY TEST ELECTRIC: CPT

## 2020-12-22 PROCEDURE — 85027 COMPLETE CBC AUTOMATED: CPT

## 2020-12-22 PROCEDURE — P9045: CPT

## 2020-12-22 PROCEDURE — 86850 RBC ANTIBODY SCREEN: CPT

## 2020-12-22 PROCEDURE — 85730 THROMBOPLASTIN TIME PARTIAL: CPT

## 2020-12-22 PROCEDURE — 86769 SARS-COV-2 COVID-19 ANTIBODY: CPT

## 2020-12-22 PROCEDURE — 82947 ASSAY GLUCOSE BLOOD QUANT: CPT

## 2020-12-22 PROCEDURE — 83605 ASSAY OF LACTIC ACID: CPT

## 2020-12-22 PROCEDURE — U0003: CPT

## 2020-12-22 PROCEDURE — 97161 PT EVAL LOW COMPLEX 20 MIN: CPT

## 2020-12-22 PROCEDURE — 86900 BLOOD TYPING SEROLOGIC ABO: CPT

## 2020-12-22 PROCEDURE — 71045 X-RAY EXAM CHEST 1 VIEW: CPT

## 2020-12-22 PROCEDURE — 85025 COMPLETE CBC W/AUTO DIFF WBC: CPT

## 2020-12-22 PROCEDURE — 71045 X-RAY EXAM CHEST 1 VIEW: CPT | Mod: 26

## 2020-12-22 PROCEDURE — 81003 URINALYSIS AUTO W/O SCOPE: CPT

## 2020-12-22 PROCEDURE — 83036 HEMOGLOBIN GLYCOSYLATED A1C: CPT

## 2020-12-22 PROCEDURE — P9041: CPT

## 2020-12-22 PROCEDURE — C1769: CPT

## 2020-12-22 PROCEDURE — 82565 ASSAY OF CREATININE: CPT

## 2020-12-22 PROCEDURE — 85018 HEMOGLOBIN: CPT

## 2020-12-22 PROCEDURE — 87641 MR-STAPH DNA AMP PROBE: CPT

## 2020-12-22 PROCEDURE — 84132 ASSAY OF SERUM POTASSIUM: CPT

## 2020-12-22 PROCEDURE — 94640 AIRWAY INHALATION TREATMENT: CPT

## 2020-12-22 PROCEDURE — 84295 ASSAY OF SERUM SODIUM: CPT

## 2020-12-22 PROCEDURE — C8929: CPT

## 2020-12-22 PROCEDURE — 83735 ASSAY OF MAGNESIUM: CPT

## 2020-12-22 PROCEDURE — C1889: CPT

## 2020-12-22 PROCEDURE — 97530 THERAPEUTIC ACTIVITIES: CPT

## 2020-12-22 PROCEDURE — 93880 EXTRACRANIAL BILAT STUDY: CPT

## 2020-12-22 PROCEDURE — C1751: CPT

## 2020-12-22 PROCEDURE — 85610 PROTHROMBIN TIME: CPT

## 2020-12-22 PROCEDURE — 85384 FIBRINOGEN ACTIVITY: CPT

## 2020-12-22 PROCEDURE — 93005 ELECTROCARDIOGRAM TRACING: CPT

## 2020-12-22 PROCEDURE — 87640 STAPH A DNA AMP PROBE: CPT

## 2020-12-22 PROCEDURE — 84484 ASSAY OF TROPONIN QUANT: CPT

## 2020-12-22 PROCEDURE — 85014 HEMATOCRIT: CPT

## 2020-12-22 PROCEDURE — P9047: CPT

## 2020-12-22 PROCEDURE — 82553 CREATINE MB FRACTION: CPT

## 2020-12-22 PROCEDURE — 85576 BLOOD PLATELET AGGREGATION: CPT

## 2020-12-22 PROCEDURE — 71046 X-RAY EXAM CHEST 2 VIEWS: CPT

## 2020-12-22 PROCEDURE — 86901 BLOOD TYPING SEROLOGIC RH(D): CPT

## 2020-12-22 PROCEDURE — 84100 ASSAY OF PHOSPHORUS: CPT

## 2020-12-22 PROCEDURE — 93010 ELECTROCARDIOGRAM REPORT: CPT

## 2020-12-22 PROCEDURE — 80053 COMPREHEN METABOLIC PANEL: CPT

## 2020-12-22 PROCEDURE — 97116 GAIT TRAINING THERAPY: CPT

## 2020-12-22 PROCEDURE — 86891 AUTOLOGOUS BLOOD OP SALVAGE: CPT

## 2020-12-22 PROCEDURE — 82550 ASSAY OF CK (CPK): CPT

## 2020-12-22 PROCEDURE — 94010 BREATHING CAPACITY TEST: CPT

## 2020-12-22 PROCEDURE — 82962 GLUCOSE BLOOD TEST: CPT

## 2020-12-22 PROCEDURE — 82435 ASSAY OF BLOOD CHLORIDE: CPT

## 2020-12-22 RX ORDER — TAMSULOSIN HYDROCHLORIDE 0.4 MG/1
1 CAPSULE ORAL
Qty: 0 | Refills: 0 | DISCHARGE

## 2020-12-22 RX ORDER — SIMVASTATIN 20 MG/1
1 TABLET, FILM COATED ORAL
Qty: 0 | Refills: 0 | DISCHARGE

## 2020-12-22 RX ORDER — ATORVASTATIN CALCIUM 80 MG/1
1 TABLET, FILM COATED ORAL
Qty: 30 | Refills: 0
Start: 2020-12-22

## 2020-12-22 RX ORDER — RAMIPRIL 5 MG
1 CAPSULE ORAL
Qty: 0 | Refills: 0 | DISCHARGE

## 2020-12-22 RX ORDER — OXYCODONE AND ACETAMINOPHEN 5; 325 MG/1; MG/1
1 TABLET ORAL
Qty: 20 | Refills: 0
Start: 2020-12-22 | End: 2020-12-26

## 2020-12-22 RX ORDER — SORBITOL SOLUTION 70 %
30 SOLUTION, ORAL MISCELLANEOUS ONCE
Refills: 0 | Status: DISCONTINUED | OUTPATIENT
Start: 2020-12-22 | End: 2020-12-22

## 2020-12-22 RX ORDER — AMLODIPINE BESYLATE 2.5 MG/1
1 TABLET ORAL
Qty: 0 | Refills: 0 | DISCHARGE

## 2020-12-22 RX ORDER — AMLODIPINE BESYLATE 2.5 MG/1
1 TABLET ORAL
Qty: 30 | Refills: 0
Start: 2020-12-22 | End: 2021-01-20

## 2020-12-22 RX ORDER — INSULIN GLARGINE 100 [IU]/ML
28 INJECTION, SOLUTION SUBCUTANEOUS
Qty: 840 | Refills: 0
Start: 2020-12-22 | End: 2021-01-20

## 2020-12-22 RX ORDER — ISOPROPYL ALCOHOL, BENZOCAINE .7; .06 ML/ML; ML/ML
1 SWAB TOPICAL
Qty: 100 | Refills: 1
Start: 2020-12-22 | End: 2021-02-09

## 2020-12-22 RX ORDER — METFORMIN HYDROCHLORIDE 850 MG/1
1 TABLET ORAL
Qty: 60 | Refills: 0
Start: 2020-12-22 | End: 2021-01-20

## 2020-12-22 RX ORDER — METFORMIN HYDROCHLORIDE 850 MG/1
1 TABLET ORAL
Qty: 0 | Refills: 0 | DISCHARGE

## 2020-12-22 RX ORDER — REPAGLINIDE 1 MG/1
1 TABLET ORAL
Qty: 90 | Refills: 0
Start: 2020-12-22 | End: 2021-01-20

## 2020-12-22 RX ORDER — DOCUSATE SODIUM 100 MG
1 CAPSULE ORAL
Qty: 20 | Refills: 0
Start: 2020-12-22 | End: 2020-12-31

## 2020-12-22 RX ORDER — TAMSULOSIN HYDROCHLORIDE 0.4 MG/1
1 CAPSULE ORAL
Qty: 30 | Refills: 0
Start: 2020-12-22

## 2020-12-22 RX ORDER — ASPIRIN/CALCIUM CARB/MAGNESIUM 324 MG
1 TABLET ORAL
Qty: 0 | Refills: 0 | DISCHARGE

## 2020-12-22 RX ORDER — FAMOTIDINE 10 MG/ML
1 INJECTION INTRAVENOUS
Qty: 30 | Refills: 0
Start: 2020-12-22

## 2020-12-22 RX ORDER — ASPIRIN/CALCIUM CARB/MAGNESIUM 324 MG
1 TABLET ORAL
Qty: 30 | Refills: 0
Start: 2020-12-22 | End: 2021-01-20

## 2020-12-22 RX ORDER — METOPROLOL TARTRATE 50 MG
1 TABLET ORAL
Qty: 90 | Refills: 0
Start: 2020-12-22

## 2020-12-22 RX ADMIN — Medication 50 MILLIGRAM(S): at 13:37

## 2020-12-22 RX ADMIN — Medication 8 UNIT(S): at 17:25

## 2020-12-22 RX ADMIN — SODIUM CHLORIDE 3 MILLILITER(S): 9 INJECTION INTRAMUSCULAR; INTRAVENOUS; SUBCUTANEOUS at 06:44

## 2020-12-22 RX ADMIN — Medication 8 UNIT(S): at 12:00

## 2020-12-22 RX ADMIN — Medication 1: at 12:01

## 2020-12-22 RX ADMIN — AMLODIPINE BESYLATE 10 MILLIGRAM(S): 2.5 TABLET ORAL at 06:43

## 2020-12-22 RX ADMIN — Medication 8 UNIT(S): at 07:49

## 2020-12-22 RX ADMIN — SODIUM CHLORIDE 3 MILLILITER(S): 9 INJECTION INTRAMUSCULAR; INTRAVENOUS; SUBCUTANEOUS at 13:37

## 2020-12-22 RX ADMIN — Medication 325 MILLIGRAM(S): at 12:00

## 2020-12-22 RX ADMIN — Medication 50 MILLIGRAM(S): at 06:43

## 2020-12-22 RX ADMIN — HEPARIN SODIUM 5000 UNIT(S): 5000 INJECTION INTRAVENOUS; SUBCUTANEOUS at 06:44

## 2020-12-22 RX ADMIN — POLYETHYLENE GLYCOL 3350 17 GRAM(S): 17 POWDER, FOR SOLUTION ORAL at 12:00

## 2020-12-22 RX ADMIN — FAMOTIDINE 20 MILLIGRAM(S): 10 INJECTION INTRAVENOUS at 12:00

## 2020-12-22 RX ADMIN — HEPARIN SODIUM 5000 UNIT(S): 5000 INJECTION INTRAVENOUS; SUBCUTANEOUS at 13:37

## 2020-12-22 NOTE — DISCHARGE NOTE PROVIDER - CARE PROVIDER_API CALL
Kyaw Hairston  THORACIC AND CARDIAC SURGERY  [address] [place] [address]  Phone: [phone]  Fax: [phone]  Follow Up Time: Routine    Singh Hill)  Internal Medicine  [address] [place] [address]  Phone: [phone]  Fax: [phone]  Follow Up Time: Routine   Kyaw Hairston  THORACIC AND CARDIAC SURGERY  [address] [place] [address]  Phone: [phone]  Fax: [phone]  Follow Up Time: Routine    Singh Hill)  Internal Medicine  [address] [address] [place] [address]  Phone: [phone]  Fax: [phone]  Follow Up Time: Routine    Alfa May  Endocrinology, Diabetes and Metabolism  [address] [place] [address]  Phone: [phone]  Fax: [phone]  Follow Up Time: 2 weeks

## 2020-12-22 NOTE — PROGRESS NOTE ADULT - PROBLEM SELECTOR PROBLEM 2
CAD in native artery
CAD in native artery
Essential hypertension
Essential hypertension
CAD in native artery
CAD in native artery
HTN (hypertension)

## 2020-12-22 NOTE — DISCHARGE NOTE NURSING/CASE MANAGEMENT/SOCIAL WORK - PATIENT PORTAL LINK FT
You can access the FollowMyHealth Patient Portal offered by [facility] by registering at the following website: [email or web address]. By joining [employer]’s FollowMyHealth portal, you will also be able to view your health information using other applications (apps) compatible with our system.

## 2020-12-22 NOTE — PROGRESS NOTE ADULT - ASSESSMENT
76 yo Male poor historian with PMHx of HTN, HLD, DM2, BPH sp cabg and now TOMMIE  POD # 5 and now TOMMIE(improving) .  TOMMIE based on JARRED criteria and likely ATN.    Likely hemodynamic from surgery.      1 Renal - Simple supportive care.  No need for diuretics or IVF    2CVS-BP is stable and as outpt will entertain ACE or ARB    3 Misc- [facility] this am     [facility]   [phone]

## 2020-12-22 NOTE — DISCHARGE NOTE PROVIDER - PROVIDER TOKENS
PROVIDER:[TOKEN:[[id number]],FOLLOWUP:[Routine]],PROVIDER:[TOKEN:[[id number]],FOLLOWUP:[Routine]] PROVIDER:[TOKEN:[[id number]],FOLLOWUP:[Routine]],PROVIDER:[TOKEN:[[id number]],FOLLOWUP:[Routine]],PROVIDER:[TOKEN:[[id number]],FOLLOWUP:[2 weeks]]

## 2020-12-22 NOTE — DISCHARGE NOTE NURSING/CASE MANAGEMENT/SOCIAL WORK - NSDCPEPT PROEDMA_GEN_ALL_CORE
Yes
15 yr old  female with history of panic attacks, anxiety and depression with no history of suicide attempts, no hx of inpatient hospitalizations, no hx of SIB (except scratching self on nee when anxious) currently in 12th grade being home school after extended absence and bullying in 10th grade, living with mother and maternal grandfather (biological father never in the picture due to drug abuse), comes in after assessment by her outpatient psychiatrist Dr. Roblero for admission due to worsening auditory hallucinations. Pt presents as very anxious, in distress, and unable to cooperate to contract for safety or safety plan. Mother also is unable to state she can keep her safe in the home. Pt is overwhelmed, with multiple recent stressors, little social supports. She will require inpatient hospitalization for observation for safety and acute treatment and stabilization.

## 2020-12-22 NOTE — PROGRESS NOTE ADULT - PROBLEM SELECTOR PROBLEM 3
HTN (hypertension)
HTN (hypertension)
Type 2 diabetes mellitus
Type 2 diabetes mellitus
HTN (hypertension)
HTN (hypertension)
DM (diabetes mellitus)

## 2020-12-22 NOTE — PROGRESS NOTE ADULT - PROBLEM SELECTOR PLAN 1
Will continue current insulin regimen for now. Will continue monitoring FS, log, and FU.  Patient with moderate insulin requirement, will benefit from insulin as outpatient. Suggest to continue insulin administration teaching and DC home on the following DM regimen:  -Lantus 28u at bedtime  -Prandin 1mg before each meal  -Endo FU 2 weeks  Patient counseled for compliance with consistent low carb diet and exercise as tolerated outpatient.
Will continue current insulin regimen for now. Will continue monitoring  blood sugars,, log, will Follow up.  Patient counseled for compliance with consistent low carb diet.
Will continue current insulin regimen for now. Will continue monitoring  blood sugars,, log, will Follow up.  Patient counseled for compliance with consistent low carb diet.
Continue with ASA 81 mg PO daily, Lopressor 25 mg PO BID, atorvastatin 80mg HS  NPO p MN, pre-op chlorhexidine shower   Plan for CABG on Thursday 12/17 with Dr. Hairston
Pre op Cardiac surgery work up in progress   Continue with ASA 81 mg PO daily, Lopressor 25 mg PO BID, Zocor 20 mg PO HS   F/U TTE results  Plan for CABG on Thursday 12/16 with Dr. Hairston
Condom catheter  Flomax
Will continue current insulin regimen for now. Will continue monitoring FS, log, and FU.  Patient with moderate insulin requirement, will benefit from insulin as outpatient. Will continue monitoring FS and make recommendations for outpatient management prior to DC.  Patient counseled for compliance with consistent low carb diet and exercise as tolerated outpatient.

## 2020-12-22 NOTE — DISCHARGE NOTE PROVIDER - CARE PROVIDERS DIRECT ADDRESSES
[email or web address] [email or web address],DirectAddress_Unknown,DirectAddress_Unknown

## 2020-12-22 NOTE — PROGRESS NOTE ADULT - PROBLEM SELECTOR PROBLEM 1
Type 2 diabetes mellitus
CAD in native artery
CAD in native artery
Type 2 diabetes mellitus
BPH (benign prostatic hyperplasia)

## 2020-12-22 NOTE — DISCHARGE NOTE PROVIDER - NSDCPNSUBOBJ_GEN_ALL_CORE
VITAL SIGNS    Telemetry:  SR 80's  Vital Signs Last 24 Hrs  T(C): 36.4 (20 @ 04:36), Max: 37 (20 @ 12:00)  T(F): 97.5 (20 @ 04:36), Max: 98.6 (20 @ 12:00)  HR: 78 (20 @ 09:52) (77 - 86)  BP: 129/82 (20 @ 04:36) (129/80 - 150/74)  RR: 18 (20 @ 04:36) (18 - 23)  SpO2: 91% (20 @ 09:52) (91% - 97%)             @ 07:01  -   @ 07:00  --------------------------------------------------------  IN: 880 mL / OUT: 2150 mL / NET: -1270 mL     @ 07:01  -   @ 11:28  --------------------------------------------------------  IN: 240 mL / OUT: 0 mL / NET: 240 mL       Daily     Daily Weight in k.6 (22 Dec 2020 08:26)  Admit Wt: Drug Dosing Weight  Height (cm): 172.7 (17 Dec 2020 07:31)  Weight (kg): 83.6 (17 Dec 2020 07:31)  BMI (kg/m2): 28 (17 Dec 2020 07:31)  BSA (m2): 1.97 (17 Dec 2020 07:31)      CAPILLARY BLOOD GLUCOSE  147 (21 Dec 2020 12:00)      POCT Blood Glucose.: 119 mg/dL (22 Dec 2020 07:25)  POCT Blood Glucose.: 194 mg/dL (21 Dec 2020 21:45)  POCT Blood Glucose.: 102 mg/dL (21 Dec 2020 17:06)  POCT Blood Glucose.: 147 mg/dL (21 Dec 2020 11:40)          MEDICATIONS  amLODIPine   Tablet 10 milliGRAM(s) Oral daily  aspirin enteric coated 325 milliGRAM(s) Oral daily  atorvastatin 80 milliGRAM(s) Oral at bedtime  dextrose 40% Gel 15 Gram(s) Oral once  dextrose 50% Injectable 25 Gram(s) IV Push once  dextrose 50% Injectable 12.5 Gram(s) IV Push once  dextrose 50% Injectable 25 Gram(s) IV Push once  famotidine    Tablet 20 milliGRAM(s) Oral daily  heparin   Injectable 5000 Unit(s) SubCutaneous every 8 hours  insulin glargine Injectable (LANTUS) 28 Unit(s) SubCutaneous at bedtime  insulin lispro (ADMELOG) corrective regimen sliding scale   SubCutaneous three times a day before meals  insulin lispro (ADMELOG) corrective regimen sliding scale   SubCutaneous at bedtime  insulin lispro Injectable (ADMELOG) 8 Unit(s) SubCutaneous three times a day before meals  metoprolol tartrate 50 milliGRAM(s) Oral every 8 hours  oxycodone    5 mG/acetaminophen 325 mG 1 Tablet(s) Oral every 6 hours PRN  oxycodone    5 mG/acetaminophen 325 mG 2 Tablet(s) Oral every 6 hours PRN  polyethylene glycol 3350 17 Gram(s) Oral daily  sodium chloride 0.9% lock flush 3 milliLiter(s) IV Push every 8 hours  sorbitol 70% Solution 30 milliLiter(s) Oral once PRN  tamsulosin 0.4 milliGRAM(s) Oral at bedtime      >>> <<<  PHYSICAL EXAM  Subjective: NAD  Neurology: alert and oriented x 3, nonfocal, no gross deficits  CV : s1s2  Sternal Wound :  CDI , Stable  Lungs: CTA b/l  Abdomen: soft, NT,ND, ( +)BM  :  voiding  Extremities:   -c/c/e    LABS  -    142  |  106  |  40<H>  ----------------------------<  133<H>  4.4   |  24  |  1.33<H>    Ca    9.2      22 Dec 2020 06:05  Phos  2.5     12-21  Mg     2.4     12-    TPro  5.9<L>  /  Alb  3.6  /  TBili  0.7  /  DBili  x   /  AST  17  /  ALT  18  /  AlkPhos  68  12-                                 9.9    5.34  )-----------( 154      ( 22 Dec 2020 06:05 )             31.6                 PAST MEDICAL & SURGICAL HISTORY:  Aneurysm of right leg    BPH (benign prostatic hyperplasia)    Osteoarthritis    Type 2 diabetes mellitus    HLD (hyperlipidemia)    HTN (hypertension)    CAD in native artery    BPH (benign prostatic hyperplasia)    OA (osteoarthritis)    DM (diabetes mellitus)    HLD (hyperlipidemia)    HTN (hypertension)    S/P appendectomy    H/O left inguinal hernia repair    S/P excision of ganglion cyst    S/P left inguinal hernia repair    S/P appendectomy

## 2020-12-22 NOTE — PROGRESS NOTE ADULT - SUBJECTIVE AND OBJECTIVE BOX
NEPHROLOGY-[facility] [phone]        Patient seen and examined in bed.  He felt good         MEDICATIONS  (STANDING):  amLODIPine   Tablet 10 milliGRAM(s) Oral daily  aspirin enteric coated 325 milliGRAM(s) Oral daily  atorvastatin 80 milliGRAM(s) Oral at bedtime  dextrose 40% Gel 15 Gram(s) Oral once  dextrose 50% Injectable 25 Gram(s) IV Push once  dextrose 50% Injectable 12.5 Gram(s) IV Push once  dextrose 50% Injectable 25 Gram(s) IV Push once  famotidine    Tablet 20 milliGRAM(s) Oral daily  heparin   Injectable 5000 Unit(s) SubCutaneous every 8 hours  insulin glargine Injectable (LANTUS) 28 Unit(s) SubCutaneous at bedtime  insulin lispro (ADMELOG) corrective regimen sliding scale   SubCutaneous three times a day before meals  insulin lispro (ADMELOG) corrective regimen sliding scale   SubCutaneous at bedtime  insulin lispro Injectable (ADMELOG) 8 Unit(s) SubCutaneous three times a day before meals  metoprolol tartrate 50 milliGRAM(s) Oral every 8 hours  polyethylene glycol 3350 17 Gram(s) Oral daily  sodium chloride 0.9% lock flush 3 milliLiter(s) IV Push every 8 hours  tamsulosin 0.4 milliGRAM(s) Oral at bedtime      VITAL:  T(C): , Max: 37 (12-21-20 @ 12:00)  T(F): , Max: 98.6 (12-21-20 @ 12:00)  HR: 77 (12-22-20 @ 04:36)  BP: 129/82 (12-22-20 @ 04:36)  BP(mean): 98 (12-22-20 @ 04:36)  RR: 18 (12-22-20 @ 04:36)  SpO2: 97% (12-22-20 @ 04:36)  Wt(kg): --    I and O's:    12-21 @ 07:01  -  12-22 @ 07:00  --------------------------------------------------------  IN: 880 mL / OUT: 2150 mL / NET: -1270 mL          PHYSICAL EXAM:    Constitutional: NAD  Neck:  No JVD  Respiratory: CTAB/L  Cardiovascular: S1 and S2  Gastrointestinal: BS+, soft, NT/ND  Extremities: No peripheral edema  Neurological: A/O x 3, no focal deficits  Psychiatric: Normal mood, normal affect  : No Flores  Skin: No rashes  Access: Not applicable    LABS:                        9.9    5.34  )-----------( 154      ( 22 Dec 2020 06:05 )             31.6     12-22    142  |  106  |  40<H>  ----------------------------<  133<H>  4.4   |  24  |  1.33<H>    Ca    9.2      22 Dec 2020 06:05  Phos  2.5     12-21  Mg     2.4     12-21    TPro  5.9<L>  /  Alb  3.6  /  TBili  0.7  /  DBili  x   /  AST  17  /  ALT  18  /  AlkPhos  68  12-21          Urine Studies:          RADIOLOGY & ADDITIONAL STUDIES:          < from: Xray Chest 1 View- PORTABLE-Routine (Xray Chest 1 View- PORTABLE-Routine in AM.) (12.21.20 @ 03:07) >    EXAM:  XR CHEST PORTABLE ROUTINE 1V                            PROCEDURE DATE:  12/21/2020            INTERPRETATION:  A single chest x-ray was obtained on December 21, 2020.    Indication: Postop.    Impression:    The heart is enlarged. Left lower lobe pneumonia and/or atelectasis. The right lung is clear. The right Cordis sheath catheter was removed. No pneumothorax. Status post sternotomy.                OPAL DEAN MD; Attending Radiologist  This document has been electronically signed. Dec 21 2020  8:49AM    < end of copied text >

## 2020-12-22 NOTE — DISCHARGE NOTE PROVIDER - NSDCFUADDINST_GEN_ALL_CORE_FT
Resume activity as tolerated  Resume low cholesterol low sodium diet  Shower daily and wash all incisions with soap and water  Ambulate at least four times daily  Use incentive spirometer every hour during the day  Record daily weight and report changes greater than 3lbs  Please follow up with your cardiologist Dr Hill in 7-10 days  Please follow up with DR Hairston in 7-10 days   Please call for an appointment  Please record glucose fingerstick and follow up with PMD for diabetic management Resume activity as tolerated  Resume low cholesterol low sodium diet  Shower daily and wash all incisions with soap and water  Ambulate at least four times daily  Use incentive spirometer every hour during the day  Record daily weight and report changes greater than 3lbs  Please follow up with your cardiologist Dr Hill in 7-10 days  Please follow up with DR Hairston in 7-10 days   Please call for an appointment  Please record glucose fingerstick and follow up with Dr May in 2 weeks for diabetic management

## 2020-12-22 NOTE — PROGRESS NOTE ADULT - PROBLEM SELECTOR PLAN 2
On medications,  no chest pain, stable, monitored and followed up by cardiothoracic team/cardiology team
Continue on Norvasc 10 mg PO daily and lopressor 25 mg PO BID   Ace inhibitor D/C to optimize renal function preoperatively
Continue on Norvasc 10 mg PO daily and lopressor 25 mg PO BID   Ace inhibitor D/C to optimize renal function preoperatively
norvasc  betablocker
On medications,  no chest pain, stable, monitored and followed up by cardiothoracic team/cardiology team

## 2020-12-22 NOTE — DISCHARGE NOTE PROVIDER - NSDCACTIVITY_GEN_ALL_CORE
Do not drive or operate machinery/Showering allowed/Walking - Indoors allowed/No heavy lifting/straining/Walking - Outdoors allowed

## 2020-12-22 NOTE — PROGRESS NOTE ADULT - SUBJECTIVE AND OBJECTIVE BOX
Cardiovascular Disease Progress Note    Overnight events: No acute events overnight.  renal fxn improving. no new cardiac sx  Otherwise review of systems negative    Objective Findings:  T(C): 36.4 (12-22-20 @ 04:36), Max: 37.6 (12-21-20 @ 08:00)  HR: 77 (12-22-20 @ 04:36) (77 - 88)  BP: 129/82 (12-22-20 @ 04:36) (117/56 - 150/74)  RR: 18 (12-22-20 @ 04:36) (15 - 38)  SpO2: 97% (12-22-20 @ 04:36) (93% - 97%)  Wt(kg): --  Daily     Daily       Physical Exam:  Gen: NAD  HEENT: EOMI  CV: RRR, normal S1 + S2, no m/r/g  Lungs: CTAB  Abd: soft, non-tender  Ext: No edema    Telemetry: nsr    Laboratory Data:                        9.9    5.34  )-----------( 154      ( 22 Dec 2020 06:05 )             31.6     12-22    142  |  106  |  40<H>  ----------------------------<  133<H>  4.4   |  24  |  1.33<H>    Ca    9.2      22 Dec 2020 06:05  Phos  2.5     12-21  Mg     2.4     12-21    TPro  5.9<L>  /  Alb  3.6  /  TBili  0.7  /  DBili  x   /  AST  17  /  ALT  18  /  AlkPhos  68  12-21              Inpatient Medications:  MEDICATIONS  (STANDING):  amLODIPine   Tablet 10 milliGRAM(s) Oral daily  aspirin enteric coated 325 milliGRAM(s) Oral daily  atorvastatin 80 milliGRAM(s) Oral at bedtime  dextrose 40% Gel 15 Gram(s) Oral once  dextrose 50% Injectable 25 Gram(s) IV Push once  dextrose 50% Injectable 12.5 Gram(s) IV Push once  dextrose 50% Injectable 25 Gram(s) IV Push once  famotidine    Tablet 20 milliGRAM(s) Oral daily  heparin   Injectable 5000 Unit(s) SubCutaneous every 8 hours  insulin glargine Injectable (LANTUS) 28 Unit(s) SubCutaneous at bedtime  insulin lispro (ADMELOG) corrective regimen sliding scale   SubCutaneous three times a day before meals  insulin lispro (ADMELOG) corrective regimen sliding scale   SubCutaneous at bedtime  insulin lispro Injectable (ADMELOG) 8 Unit(s) SubCutaneous three times a day before meals  metoprolol tartrate 50 milliGRAM(s) Oral every 8 hours  polyethylene glycol 3350 17 Gram(s) Oral daily  sodium chloride 0.9% lock flush 3 milliLiter(s) IV Push every 8 hours  tamsulosin 0.4 milliGRAM(s) Oral at bedtime      Assessment:  -angina s/p lhc c/w mvd EF 55%; LM Distal 50%, LAD Prox 50%Lcx Ostial 80%, OM closed 100%, RCA Closed 100%. P  -DM  -HTN    Recs:  s/p 3v cabg with dr rodarte 12/17  diuresis prn to maintain euvolemic  cw asa statin and beta blockers  tele monitoring  bp control  michael -->likely hemodynamically mediated. renal following. scr improving      Over 25 minutes spent on total encounter; more than 50% of the visit was spent counseling and/or coordinating care by the attending physician.      Singh Hill MD   Cardiovascular Disease  [phone]

## 2020-12-22 NOTE — PROGRESS NOTE ADULT - REASON FOR ADMISSION
Cardiac Surgery
12/17  C4L
Cardiac Surgery

## 2020-12-22 NOTE — DISCHARGE NOTE NURSING/CASE MANAGEMENT/SOCIAL WORK - NSDCPNINST_GEN_ALL_CORE
CALL MD/911/GO TO ER WITH ANY CHEST PAIN/SHORTNESS OF BREATH/BLEEDING/FEVER/NUMBNESS/SLURRED SPEECH/ANY CHANGE IN STATUS AT ALL!! BE SURE TO TAKE ALL MEDICATION AS PRESCRIBED AND FOLLOW UP WITH MD AS DIRECTED!! FEEL BETTER :) CALL MD/911/GO TO ER WITH ANY CHEST PAIN/SHORTNESS OF BREATH/BLEEDING/FEVER/NUMBNESS/SLURRED SPEECH/ANY CHANGE IN STATUS AT ALL!! BE SURE TO TAKE ALL MEDICATION AS PRESCRIBED AND FOLLOW UP WITH MD AS DIRECTED!! FEEL BETTER :)    Pt educated to check blood glucose as directed. Pt demonstrated how to check glucose and how to administer shot from Kwikpen. Pt administered self ordered dose of insulin at dinner. Diabetes education provided to Pt and return demonstration witnessed by RN.

## 2020-12-22 NOTE — PROGRESS NOTE ADULT - PROBLEM SELECTOR PLAN 3
Suggest to continue medications, monitoring, FU primary team recommendations. .
Suggest to continue medications, monitoring, FU primary team recommendations. .
Lantus / premeal  FS  ac  /  hs
Suggest to continue medications, monitoring, FU primary team recommendations. .
A1c 6.9 - Glycemic Control   Accuchecks 4 times a day AC and HS with Admelog moderate corrective sliding scale   Continue on DASH/ Diabetic Diet with Steady Carb
A1c 6.9 - Glycemic Control   D/C Metformin to optimize renal function preoperatively  Accuchecks 4 times a day AC and HS with Admelog moderate corrective sliding scale   Continue on DASH/ Diabetic Diet with Steady Carb
Suggest to continue medications, monitoring, FU primary team recommendations. .

## 2020-12-22 NOTE — DISCHARGE NOTE PROVIDER - NSDCCPCAREPLAN_GEN_ALL_CORE_FT
PRINCIPAL DISCHARGE DIAGNOSIS  Diagnosis: CAD in native artery  Assessment and Plan of Treatment: CAD in native artery      SECONDARY DISCHARGE DIAGNOSES  Diagnosis: HTN (hypertension)  Assessment and Plan of Treatment: HTN (hypertension)    Diagnosis: CAD in native artery  Assessment and Plan of Treatment: CAD in native artery

## 2020-12-22 NOTE — PROGRESS NOTE ADULT - ASSESSMENT
Assessment  DMT2: 75y Male with DM T2 with hyperglycemia admitted with chest pain, postop on basal bolus insulin, blood sugars trending within overall acceptable range, no  hypoglycemic episodes. Patient is eating meals, appears alert and comfortable, insulin administration teaching being started.  CAD: S/P CABG, on medications, monitored, stable.  HLD: On statin.        Alfa May MD  Cell: [phone]  Office: [phone]           Assessment  DMT2: 75y Male with DM T2 with hyperglycemia admitted with chest pain, postop on basal bolus insulin,  blood sugars trending within overall acceptable range, no  hypoglycemic episodes. Patient is eating meals, appears alert and comfortable, insulin administration teaching being started.  CAD: S/P CABG, on medications, monitored, stable.  HLD: On statin.        Alfa May MD  Cell: [phone]  Office: [phone]

## 2020-12-22 NOTE — PROGRESS NOTE ADULT - PROVIDER SPECIALTY LIST ADULT
CT Surgery
CT Surgery
Cardiology
Critical Care
Critical Care
CT Surgery
Cardiology
Critical Care
Nephrology
Cardiology
Cardiology
Critical Care
Endocrinology

## 2020-12-22 NOTE — DISCHARGE NOTE PROVIDER - NSDCMRMEDTOKEN_GEN_ALL_CORE_FT
amLODIPine 10 mg oral tablet: 1 tab(s) orally once a day  Aspirin Enteric Coated 325 mg oral delayed release tablet: 1 tab(s) orally once a day  atorvastatin 80 mg oral tablet: 1 tab(s) orally once a day (at bedtime)  Colace 100 mg oral capsule: 1 cap(s) orally 2 times a day   famotidine 20 mg oral tablet: 1 tab(s) orally once a day  Flomax 0.4 mg oral capsule: 1 cap(s) orally once a day (at bedtime)  Lopressor 50 mg oral tablet: 1 tab(s) orally every 8 hours  metFORMIN 500 mg oral tablet: 1 tab(s) orally 2 times a day  Percocet 5 mg-325 mg oral tablet: 1 tab(s) orally every 6 hours, As needed, Moderate Pain (4 - 6) MDD:four tabs   alcohol swabs : Apply topically to affected area 4 times a day   amLODIPine 10 mg oral tablet: 1 tab(s) orally once a day  Aspirin Enteric Coated 325 mg oral delayed release tablet: 1 tab(s) orally once a day  atorvastatin 80 mg oral tablet: 1 tab(s) orally once a day (at bedtime)  Basaglar KwikPen 100 units/mL subcutaneous solution: 28 unit(s) subcutaneous once a day (at bedtime)   Colace 100 mg oral capsule: 1 cap(s) orally 2 times a day   famotidine 20 mg oral tablet: 1 tab(s) orally once a day  Flomax 0.4 mg oral capsule: 1 cap(s) orally once a day (at bedtime)  glucometer (per patient&#x27;s insurance): Test blood sugars four times a day. Dispense #1 glucometer.  Insulin Pen Needles, 4mm: 1 application subcutaneously 4 times a day. ** Use with insulin pen **   lancets: 1 application subcutaneously 4 times a day   Lopressor 50 mg oral tablet: 1 tab(s) orally every 8 hours  Percocet 5 mg-325 mg oral tablet: 1 tab(s) orally every 6 hours, As needed, Moderate Pain (4 - 6) MDD:four tabs  Prandin 1 mg oral tablet: 1 tab(s) orally 3 times a day (before meals)   test strips (per patient&#x27;s insurance): 1 application subcutaneously 4 times a day. ** Compatible with patient&#x27;s glucometer **

## 2020-12-22 NOTE — DISCHARGE NOTE PROVIDER - HOSPITAL COURSE
76 yo Male poor historian with PMHx of HTN, HLD, DM2, BPH and OA    S/P CABG x 3 12/17/20  post op  worsening TOMMIE> renal consulted  12/21 Tx 2 [place]  12/22 O2 saturation room air ambulating 91%, at rest room air 95%  laxatives for constipation. VSS Pt discharge home with RW

## 2020-12-22 NOTE — PROGRESS NOTE ADULT - SUBJECTIVE AND OBJECTIVE BOX
Chief complaint  Patient is a 75y old  Male who presents with a chief complaint of Cardiac Surgery (22 Dec 2020 11:05)   Review of systems  Patient in bed, looks comfortable, no hypoglycemic episodes.    Labs and Fingersticks  CAPILLARY BLOOD GLUCOSE      POCT Blood Glucose.: 180 mg/dL (22 Dec 2020 11:39)  POCT Blood Glucose.: 119 mg/dL (22 Dec 2020 07:25)  POCT Blood Glucose.: 194 mg/dL (21 Dec 2020 21:45)  POCT Blood Glucose.: 102 mg/dL (21 Dec 2020 17:06)      Anion Gap, Serum: 12 (12-22 @ 06:05)  Anion Gap, Serum: 11 (12-21 @ 00:38)      Calcium, Total Serum: 9.2 (12-22 @ 06:05)  Calcium, Total Serum: 9.1 (12-21 @ 00:38)  Albumin, Serum: 3.6 (12-21 @ 00:38)    Alanine Aminotransferase (ALT/SGPT): 18 (12-21 @ 00:38)  Alkaline Phosphatase, Serum: 68 (12-21 @ 00:38)  Aspartate Aminotransferase (AST/SGOT): 17 (12-21 @ 00:38)        12-22    142  |  106  |  40<H>  ----------------------------<  133<H>  4.4   |  24  |  1.33<H>    Ca    9.2      22 Dec 2020 06:05  Phos  2.5     12-21  Mg     2.4     12-21    TPro  5.9<L>  /  Alb  3.6  /  TBili  0.7  /  DBili  x   /  AST  17  /  ALT  18  /  AlkPhos  68  12-21                        9.9    5.34  )-----------( 154      ( 22 Dec 2020 06:05 )             31.6     Medications  MEDICATIONS  (STANDING):  amLODIPine   Tablet 10 milliGRAM(s) Oral daily  aspirin enteric coated 325 milliGRAM(s) Oral daily  atorvastatin 80 milliGRAM(s) Oral at bedtime  dextrose 40% Gel 15 Gram(s) Oral once  dextrose 50% Injectable 25 Gram(s) IV Push once  dextrose 50% Injectable 12.5 Gram(s) IV Push once  dextrose 50% Injectable 25 Gram(s) IV Push once  famotidine    Tablet 20 milliGRAM(s) Oral daily  heparin   Injectable 5000 Unit(s) SubCutaneous every 8 hours  insulin glargine Injectable (LANTUS) 28 Unit(s) SubCutaneous at bedtime  insulin lispro (ADMELOG) corrective regimen sliding scale   SubCutaneous three times a day before meals  insulin lispro (ADMELOG) corrective regimen sliding scale   SubCutaneous at bedtime  insulin lispro Injectable (ADMELOG) 8 Unit(s) SubCutaneous three times a day before meals  metoprolol tartrate 50 milliGRAM(s) Oral every 8 hours  polyethylene glycol 3350 17 Gram(s) Oral daily  sodium chloride 0.9% lock flush 3 milliLiter(s) IV Push every 8 hours  tamsulosin 0.4 milliGRAM(s) Oral at bedtime      Physical Exam  General: Patient comfortable in bed  Vital Signs Last 12 Hrs  T(F): 97.5 (12-22-20 @ 04:36), Max: 97.5 (12-22-20 @ 04:36)  HR: 78 (12-22-20 @ 09:52) (77 - 78)  BP: 129/82 (12-22-20 @ 04:36) (129/82 - 129/82)  BP(mean): 98 (12-22-20 @ 04:36) (98 - 98)  RR: 18 (12-22-20 @ 04:36) (18 - 18)  SpO2: 91% (12-22-20 @ 09:52) (91% - 97%)  Neck: No palpable thyroid nodules.  CVS: S1S2, No murmurs  Respiratory: No wheezing, no crepitations  GI: Abdomen soft, bowel sounds positive  Musculoskeletal:  edema lower extremities.   Skin: No skin rashes, no ecchymosis    Diagnostics               Chief complaint  Patient is a 75y old  Male who presents with a chief complaint of Cardiac Surgery (22 Dec 2020 11:05)   Review of systems  Patient in bed, looks comfortable, no hypoglycemic episodes.    Labs and Fingersticks  CAPILLARY BLOOD GLUCOSE    POCT Blood Glucose.: 180 mg/dL (22 Dec 2020 11:39)  POCT Blood Glucose.: 119 mg/dL (22 Dec 2020 07:25)  POCT Blood Glucose.: 194 mg/dL (21 Dec 2020 21:45)  POCT Blood Glucose.: 102 mg/dL (21 Dec 2020 17:06)      Anion Gap, Serum: 12 (12-22 @ 06:05)  Anion Gap, Serum: 11 (12-21 @ 00:38)      Calcium, Total Serum: 9.2 (12-22 @ 06:05)  Calcium, Total Serum: 9.1 (12-21 @ 00:38)  Albumin, Serum: 3.6 (12-21 @ 00:38)    Alanine Aminotransferase (ALT/SGPT): 18 (12-21 @ 00:38)  Alkaline Phosphatase, Serum: 68 (12-21 @ 00:38)  Aspartate Aminotransferase (AST/SGOT): 17 (12-21 @ 00:38)        12-22    142  |  106  |  40<H>  ----------------------------<  133<H>  4.4   |  24  |  1.33<H>    Ca    9.2      22 Dec 2020 06:05  Phos  2.5     12-21  Mg     2.4     12-21    TPro  5.9<L>  /  Alb  3.6  /  TBili  0.7  /  DBili  x   /  AST  17  /  ALT  18  /  AlkPhos  68  12-21                        9.9    5.34  )-----------( 154      ( 22 Dec 2020 06:05 )             31.6     Medications  MEDICATIONS  (STANDING):  amLODIPine   Tablet 10 milliGRAM(s) Oral daily  aspirin enteric coated 325 milliGRAM(s) Oral daily  atorvastatin 80 milliGRAM(s) Oral at bedtime  dextrose 40% Gel 15 Gram(s) Oral once  dextrose 50% Injectable 25 Gram(s) IV Push once  dextrose 50% Injectable 12.5 Gram(s) IV Push once  dextrose 50% Injectable 25 Gram(s) IV Push once  famotidine    Tablet 20 milliGRAM(s) Oral daily  heparin   Injectable 5000 Unit(s) SubCutaneous every 8 hours  insulin glargine Injectable (LANTUS) 28 Unit(s) SubCutaneous at bedtime  insulin lispro (ADMELOG) corrective regimen sliding scale   SubCutaneous three times a day before meals  insulin lispro (ADMELOG) corrective regimen sliding scale   SubCutaneous at bedtime  insulin lispro Injectable (ADMELOG) 8 Unit(s) SubCutaneous three times a day before meals  metoprolol tartrate 50 milliGRAM(s) Oral every 8 hours  polyethylene glycol 3350 17 Gram(s) Oral daily  sodium chloride 0.9% lock flush 3 milliLiter(s) IV Push every 8 hours  tamsulosin 0.4 milliGRAM(s) Oral at bedtime      Physical Exam  General: Patient comfortable in bed  Vital Signs Last 12 Hrs  T(F): 97.5 (12-22-20 @ 04:36), Max: 97.5 (12-22-20 @ 04:36)  HR: 78 (12-22-20 @ 09:52) (77 - 78)  BP: 129/82 (12-22-20 @ 04:36) (129/82 - 129/82)  BP(mean): 98 (12-22-20 @ 04:36) (98 - 98)  RR: 18 (12-22-20 @ 04:36) (18 - 18)  SpO2: 91% (12-22-20 @ 09:52) (91% - 97%)  Neck: No palpable thyroid nodules.  CVS: S1S2, No murmurs  Respiratory: No wheezing, no crepitations  GI: Abdomen soft, bowel sounds positive  Musculoskeletal:  edema lower extremities.   Skin: No skin rashes, no ecchymosis    Diagnostics

## 2020-12-23 ENCOUNTER — TRANSCRIPTION ENCOUNTER (OUTPATIENT)
Age: 75
End: 2020-12-23

## 2020-12-23 ENCOUNTER — NON-APPOINTMENT (OUTPATIENT)
Age: 75
End: 2020-12-23

## 2020-12-23 DIAGNOSIS — Z95.1 PRESENCE OF AORTOCORONARY BYPASS GRAFT: ICD-10-CM

## 2020-12-23 RX ORDER — REPAGLINIDE 1 MG/1
1 TABLET ORAL 3 TIMES DAILY
Refills: 0 | Status: ACTIVE | COMMUNITY
Start: 2020-12-23

## 2020-12-23 RX ORDER — ASPIRIN 325 MG/1
325 TABLET, FILM COATED ORAL
Refills: 0 | Status: ACTIVE | COMMUNITY
Start: 2020-12-23

## 2020-12-23 RX ORDER — OXYCODONE AND ACETAMINOPHEN 5; 325 MG/1; MG/1
5-325 TABLET ORAL
Refills: 0 | Status: ACTIVE | COMMUNITY
Start: 2020-12-23

## 2020-12-23 RX ORDER — TAMSULOSIN HYDROCHLORIDE 0.4 MG/1
0.4 CAPSULE ORAL
Refills: 0 | Status: ACTIVE | COMMUNITY
Start: 2020-12-23

## 2020-12-23 RX ORDER — ATORVASTATIN CALCIUM 80 MG/1
80 TABLET, FILM COATED ORAL
Refills: 0 | Status: ACTIVE | COMMUNITY
Start: 2020-12-23

## 2020-12-23 RX ORDER — DOCUSATE SODIUM 100 MG/1
100 CAPSULE ORAL
Refills: 0 | Status: ACTIVE | COMMUNITY
Start: 2020-12-23

## 2020-12-23 RX ORDER — FAMOTIDINE 20 MG/1
20 TABLET, FILM COATED ORAL
Refills: 0 | Status: ACTIVE | COMMUNITY
Start: 2020-12-23

## 2020-12-23 RX ORDER — AMLODIPINE BESYLATE 10 MG/1
10 TABLET ORAL DAILY
Refills: 0 | Status: ACTIVE | COMMUNITY
Start: 2020-12-23

## 2020-12-24 ENCOUNTER — APPOINTMENT (OUTPATIENT)
Dept: CARE COORDINATION | Facility: HOME HEALTH | Age: 75
End: 2020-12-24
Payer: MEDICARE

## 2020-12-24 PROCEDURE — 99024 POSTOP FOLLOW-UP VISIT: CPT

## 2020-12-24 NOTE — PHYSICAL EXAM
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

## 2020-12-24 NOTE — HISTORY OF PRESENT ILLNESS
[Home] : at home, [unfilled] , at the time of the visit. [Other Location: e.g. Home (Enter Location, City,State)___] : at [unfilled] [Family Member] : family member [Verbal consent obtained from patient] : the patient, [unfilled] [FreeTextEntry1] : FOLLOW YOUR HEART\par \par Reason for Admission	Cardiac Surgery \par Hospital Course	 \par 76 yo Male poor historian with PMHx of HTN, HLD, DM2, BPH and OA \par  S/P CABG x 3 12/17/20 \par post op  worsening TOMMIE> renal consulted \par 12/21 Tx 2 [place] \par 12/22 O2 saturation room air ambulating 91%, at rest room air 95%  laxatives \par for constipation. VSS Pt discharge home with RW \par \par Patient appears comfortable - he offers no concerns.  Patient's son and daughter participated in the visit.

## 2020-12-24 NOTE — ASSESSMENT
[FreeTextEntry1] : Overall, patient is progressing well.  He is compliant with all meds/post-op cares as per DC instructions.\par \par BS range ~ 128\par \par automatic BP cuff script sent to pharmacy\par \par no BM since prior to discharge at the hospital\par - + flatus\par - increase colace to TID\par - start miralax HS\par \par Will start performing daily weights for fluid management\par \par Education\par 1.  DC instructions reviewed with patient - discussed importance of medications (indication, schedule, side effects, and importance of compliance reviewed) and f/u appointments.\par 2.  Clean incision sites daily - shower indirectly, clean with soap and water, pat dry.  Avoid the use of lotions, ointments, powders, and perfumes on or around incision sites.\par 3.  Sternal precautions - avoid any heavy lifting (>5-10 lbs x 8 weeks), raising both arms above head simultaneously.\par 4.  Place TEDs on in AM prior to getting out of bed and off in PM when returning to bed.\par 5.  Follow a heart healthy diet - low salt, low fat.\par 6.  Exercise daily.\par 7.  Monitor and call [facility] NP for signs and symptoms of infection (redness, drainage, and fever).\par 8.  Monitor daily weights (call for a gain of 2-3 lbs/day or 5-6 lbs/week). \par 9.  Blood sugar control necessary for wound healing if applicable.\par 10.  Avoid straining during BM - use stool softners as needed. \par 11.  Instructed on importance of incentive spirometry use (10x/hour).\par \par Patient verbalized understanding of all education outlined above. Pt instructed to call [facility] NP for any issues as delineated above.\par \par PLAN\par 1.  Monitor daily weights\par 2.  Continue current medications as prescribed\par 3.  Incentive spirometry use\par 4.  Maintain sternal precautions\par 5.  Exercise daily\par 5.  Maintain HH diet\par 6.  Maintain TEDs\par 7.  Keep legs elevated\par 8.  F/U appointments - \par CTSx Marika 12/30\par Cardio Sergio TBD x 2 weeks\par PMD/Endocrine (?) May TBD\par 9.  [facility] NP will continue to f/u with patient status - Pt agrees to call with any questions/concerns\par 10. To recover without complications.\par

## 2020-12-29 PROBLEM — Z95.1 S/P CABG X 4: Status: ACTIVE | Noted: 2020-12-29

## 2020-12-30 ENCOUNTER — APPOINTMENT (OUTPATIENT)
Dept: CARDIOTHORACIC SURGERY | Facility: CLINIC | Age: 75
End: 2020-12-30

## 2020-12-30 VITALS
DIASTOLIC BLOOD PRESSURE: 68 MMHG | TEMPERATURE: 97.6 F | HEART RATE: 80 BPM | RESPIRATION RATE: 14 BRPM | SYSTOLIC BLOOD PRESSURE: 150 MMHG | OXYGEN SATURATION: 95 %

## 2020-12-30 DIAGNOSIS — Z95.1 PRESENCE OF AORTOCORONARY BYPASS GRAFT: ICD-10-CM

## 2020-12-30 DIAGNOSIS — G47.00 INSOMNIA, UNSPECIFIED: ICD-10-CM

## 2020-12-30 RX ORDER — INSULIN GLARGINE 100 [IU]/ML
100 INJECTION, SOLUTION SUBCUTANEOUS
Qty: 10 | Refills: 0 | Status: ACTIVE | COMMUNITY
Start: 2020-12-23 | End: 1900-01-01

## 2021-01-05 PROBLEM — G47.00 INSOMNIA: Status: ACTIVE | Noted: 2021-01-05

## 2021-01-05 RX ORDER — GLUCOSAMINE/MSM/CHONDROIT SULF 500-166.6
10 TABLET ORAL
Qty: 60 | Refills: 0 | Status: ACTIVE | COMMUNITY
Start: 2021-01-05 | End: 1900-01-01

## 2021-01-12 RX ORDER — LANCETS 33 GAUGE
EACH MISCELLANEOUS
Qty: 1 | Refills: 0 | Status: ACTIVE | COMMUNITY
Start: 2021-01-12 | End: 1900-01-01

## 2021-01-12 RX ORDER — BLOOD SUGAR DIAGNOSTIC
STRIP MISCELLANEOUS
Qty: 2 | Refills: 0 | Status: ACTIVE | COMMUNITY
Start: 2021-01-12 | End: 1900-01-01

## 2021-01-19 ENCOUNTER — APPOINTMENT (OUTPATIENT)
Dept: RADIOLOGY | Facility: CLINIC | Age: 76
End: 2021-01-19

## 2021-01-20 ENCOUNTER — APPOINTMENT (OUTPATIENT)
Dept: RADIOLOGY | Facility: CLINIC | Age: 76
End: 2021-01-20

## 2021-01-20 ENCOUNTER — OUTPATIENT (OUTPATIENT)
Dept: OUTPATIENT SERVICES | Facility: HOSPITAL | Age: 76
LOS: 1 days | End: 2021-01-20
Payer: COMMERCIAL

## 2021-01-20 DIAGNOSIS — Z00.8 ENCOUNTER FOR OTHER GENERAL EXAMINATION: ICD-10-CM

## 2021-01-20 DIAGNOSIS — Z98.890 OTHER SPECIFIED POSTPROCEDURAL STATES: Chronic | ICD-10-CM

## 2021-01-20 DIAGNOSIS — Z90.49 ACQUIRED ABSENCE OF OTHER SPECIFIED PARTS OF DIGESTIVE TRACT: Chronic | ICD-10-CM

## 2021-01-20 PROCEDURE — 71046 X-RAY EXAM CHEST 2 VIEWS: CPT | Mod: 26

## 2021-01-20 PROCEDURE — 71046 X-RAY EXAM CHEST 2 VIEWS: CPT

## 2021-01-21 ENCOUNTER — TRANSCRIPTION ENCOUNTER (OUTPATIENT)
Age: 76
End: 2021-01-21

## 2021-02-05 NOTE — H&P ADULT - NSHPSOCIALHISTORY_GEN_ALL_CORE
SOCIAL HISTORY:  Smoker: [ ] Yes  [ ] No        PACK YEARS:                         WHEN QUIT?  ETOH use: [ ] Yes  [ ] No              FREQUENCY / QUANTITY:  Ilicit Drug use:  [ ] Yes  [ ] No  Occupation:  Live with:  Assist device use: SOCIAL HISTORY:  Smoker: [ ] Yes  [X] No        PACK YEARS:                         WHEN QUIT?  ETOH use: [ ] Yes  [ X] No              FREQUENCY / QUANTITY:  Ilicit Drug use:  [ ] Yes  [X] No  Occupation: Retired    Live with: Son   Assist device use: Denies to [facility]/Ambulance

## 2021-02-06 ENCOUNTER — TRANSCRIPTION ENCOUNTER (OUTPATIENT)
Age: 76
End: 2021-02-06

## 2021-02-19 RX ORDER — METOPROLOL TARTRATE 50 MG/1
50 TABLET, FILM COATED ORAL EVERY 8 HOURS
Qty: 270 | Refills: 0 | Status: ACTIVE | COMMUNITY
Start: 2020-12-23 | End: 1900-01-01

## 2021-06-08 ENCOUNTER — EMERGENCY (EMERGENCY)
Facility: HOSPITAL | Age: 76
LOS: 1 days | Discharge: HOME | End: 2021-06-08
Attending: STUDENT IN AN ORGANIZED HEALTH CARE EDUCATION/TRAINING PROGRAM
Payer: COMMERCIAL

## 2021-06-08 VITALS
RESPIRATION RATE: 18 BRPM | WEIGHT: 162.92 LBS | HEIGHT: 68 IN | DIASTOLIC BLOOD PRESSURE: 98 MMHG | HEART RATE: 74 BPM | OXYGEN SATURATION: 96 % | SYSTOLIC BLOOD PRESSURE: 182 MMHG | TEMPERATURE: 98 F

## 2021-06-08 VITALS
OXYGEN SATURATION: 99 % | HEART RATE: 60 BPM | TEMPERATURE: 98 F | DIASTOLIC BLOOD PRESSURE: 94 MMHG | SYSTOLIC BLOOD PRESSURE: 181 MMHG | RESPIRATION RATE: 18 BRPM

## 2021-06-08 DIAGNOSIS — Z90.49 ACQUIRED ABSENCE OF OTHER SPECIFIED PARTS OF DIGESTIVE TRACT: Chronic | ICD-10-CM

## 2021-06-08 DIAGNOSIS — Z98.890 OTHER SPECIFIED POSTPROCEDURAL STATES: Chronic | ICD-10-CM

## 2021-06-08 PROCEDURE — 99283 EMERGENCY DEPT VISIT LOW MDM: CPT

## 2021-06-08 PROCEDURE — 99284 EMERGENCY DEPT VISIT MOD MDM: CPT

## 2021-06-08 RX ORDER — MECLIZINE HCL 12.5 MG
25 TABLET ORAL ONCE
Refills: 0 | Status: COMPLETED | OUTPATIENT
Start: 2021-06-08 | End: 2021-06-08

## 2021-06-08 RX ORDER — MECLIZINE HCL 12.5 MG
1 TABLET ORAL
Qty: 12 | Refills: 0
Start: 2021-06-08

## 2021-06-08 RX ADMIN — Medication 25 MILLIGRAM(S): at 21:56

## 2021-06-08 NOTE — ED PROVIDER NOTE - OBJECTIVE STATEMENT
76M hx cad s/p cabg, HTN, HLD, DM2, "chronic R side hearing loss" p/w dizziness. Patient reports intermittent room spinning dizziness since yesterday. Worse with head movement, especially when laying down onto r ear. States he has had similar episodes in the past where he was able to clean the wax out of his ear and he felt better. Denies fevers, vomiting, vision changes, new medications, weakness, chest pain, shortness of breath, hearing changes, no other complaints.

## 2021-06-08 NOTE — ED PROVIDER NOTE - PROGRESS NOTE DETAILS
AG attg: patient feels improved. Ambulating without difficulty. Will d/c home with neuro f/u and strict return pecautions.

## 2021-06-08 NOTE — ED ADULT NURSE NOTE - NSIMPLEMENTINTERV_GEN_ALL_ED
Implemented All Fall with Harm Risk Interventions:  [place] to call system. Call bell, personal items and telephone within reach. Instruct patient to call for assistance. Room bathroom lighting operational. Non-slip footwear when patient is off stretcher. Physically safe environment: no spills, clutter or unnecessary equipment. Stretcher in lowest position, wheels locked, appropriate side rails in place. Provide visual cue, wrist band, yellow gown, etc. Monitor gait and stability. Monitor for mental status changes and reorient to person, place, and time. Review medications for side effects contributing to fall risk. Reinforce activity limits and safety measures with patient and family. Provide visual clues: red socks.

## 2021-06-08 NOTE — ED PROVIDER NOTE - PMH
Aneurysm of right leg    BPH (benign prostatic hyperplasia)    BPH (benign prostatic hyperplasia)    CAD in native artery    DM (diabetes mellitus)    HLD (hyperlipidemia)    HLD (hyperlipidemia)    HTN (hypertension)    HTN (hypertension)    OA (osteoarthritis)    Osteoarthritis    Type 2 diabetes mellitus

## 2021-06-08 NOTE — ED PROVIDER NOTE - CLINICAL SUMMARY MEDICAL DECISION MAKING FREE TEXT BOX
76M hx cad s/p cabg, HTN, HLD, DM2 p/w vertigo. C/w peripheral vertigo. no obvious fb impaction. Will give supportive care and reassess.

## 2021-06-08 NOTE — ED PROVIDER NOTE - NSFOLLOWUPINSTRUCTIONS_ED_ALL_ED_FT
Please return to the ER if you develop difficulty speaking/swallowing, walking, severe weakness, severe headache, vision changes, chest pain, shortness of breath, or if you have any other concerns.     Please take meclizine as prescribed for dizziness. Please follow up with neurology for further evaluation and treatment.

## 2021-06-08 NOTE — ED PROVIDER NOTE - NEUROLOGICAL, MLM
Alert and oriented, no focal deficits, no motor or sensory deficits. cn2-12 grossly intact, normal speech and tone. +fatiguable horizontal nystagmus to R. Reproducible vertigo when turning head to R. no pronator drift, no dysmetria/dysdiadokinesia

## 2021-06-08 NOTE — ED PROVIDER NOTE - CARDIAC, MLM
Normal rate, regular rhythm.  Heart sounds S1, S2.  No murmurs, rubs or gallops. 2+ pulses in distal extremities b/l.

## 2021-06-08 NOTE — ED ADULT NURSE NOTE - OBJECTIVE STATEMENT
76yr old male w./ pmhx of BPH, HTN, HLD, and triple bypass (6 months ago). presents to ED c/o dizziness. Pt states he has been experiencing dizziness since yesterday. Pt states the dizziness is worse when he moves his head, and he also got lightheaded, and nauseous, and feels unsteady when ambulating. Pt states these symptoms have occurred before and is usually from wax impaction that has to be removed. Pt denies any syncope, LOC, vomiting, blurry vision, headaches, AMS, neuro problems, CP or SOB. Pt assessed by MD, bed lowered and locked, call bell within reach. 76yr old male w./ pmhx of BPH, HTN, HLD, and triple bypass (6 months ago). presents to ED c/o dizziness. Pt states he has been experiencing dizziness since yesterday. Pt states the dizziness is worse when he moves his head, and he also got lightheaded, and nauseous, and feels unsteady when ambulating. Pt states these symptoms have occurred before and is usually from wax impaction that has to be removed. Pt denies any syncope, LOC, vomiting, blurry vision, headaches, AMS, neuro problems, CP or SOB. Pt is A&Ox3, n Pt assessed by MD, bed lowered and locked, call bell within reach. 76yr old male w./ pmhx of BPH, HTN, HLD, and triple bypass (6 months ago). presents to ED c/o dizziness. Pt states he has been experiencing intermittent dizziness since yesterday. Pt states the dizziness is worse when he lays down on right ear, or moves his head, and he also got lightheaded, and nauseous, and feels unsteady when ambulating. Pt states these symptoms have occurred before and is usually from wax impaction that has to be removed. Pt denies any syncope, LOC, vomiting, blurry vision, headaches, AMS, neuro problems, CP or SOB. Pt is A&Ox3, n Pt assessed by MD, bed lowered and locked, call bell within reach.

## 2021-06-08 NOTE — ED PROVIDER NOTE - NSFOLLOWUPCLINICS_GEN_ALL_ED_FT
[facility]  Neurology  [address] [place] [address] [address] [place] [address]  Phone: [phone]  Fax:

## 2021-06-08 NOTE — ED PROVIDER NOTE - PATIENT PORTAL LINK FT
You can access the FollowMyHealth Patient Portal offered by [facility] by registering at the following website: [email or web address]. By joining [facility]’s FollowMyHealth portal, you will also be able to view your health information using other applications (apps) compatible with our system.

## 2021-06-08 NOTE — ED PROVIDER NOTE - ENMT, MLM
Airway patent, Nasal mucosa clear. Mouth with normal mucosa. Throat has no vesicles, no oropharyngeal exudates and uvula is midline. small amount of cerumen in b/l ears. not impacted.

## 2021-08-06 ENCOUNTER — INPATIENT (INPATIENT)
Facility: HOSPITAL | Age: 76
LOS: 2 days | Discharge: HOME | DRG: 419 | End: 2021-08-09
Attending: INTERNAL MEDICINE | Admitting: INTERNAL MEDICINE
Payer: COMMERCIAL

## 2021-08-06 VITALS
HEART RATE: 80 BPM | HEIGHT: 68 IN | SYSTOLIC BLOOD PRESSURE: 148 MMHG | RESPIRATION RATE: 16 BRPM | TEMPERATURE: 98 F | DIASTOLIC BLOOD PRESSURE: 87 MMHG | WEIGHT: 164.91 LBS | OXYGEN SATURATION: 95 %

## 2021-08-06 DIAGNOSIS — Z98.890 OTHER SPECIFIED POSTPROCEDURAL STATES: Chronic | ICD-10-CM

## 2021-08-06 DIAGNOSIS — K81.9 CHOLECYSTITIS, UNSPECIFIED: ICD-10-CM

## 2021-08-06 DIAGNOSIS — Z90.49 ACQUIRED ABSENCE OF OTHER SPECIFIED PARTS OF DIGESTIVE TRACT: Chronic | ICD-10-CM

## 2021-08-06 LAB
ALBUMIN SERPL ELPH-MCNC: 3.9 G/DL — SIGNIFICANT CHANGE UP (ref 3.3–5)
ALP SERPL-CCNC: 340 U/L — HIGH (ref 40–120)
ALT FLD-CCNC: 286 U/L — HIGH (ref 10–45)
ANION GAP SERPL CALC-SCNC: 12 MMOL/L — SIGNIFICANT CHANGE UP (ref 5–17)
AST SERPL-CCNC: 262 U/L — HIGH (ref 10–40)
BASE EXCESS BLDV CALC-SCNC: 2.9 MMOL/L — HIGH (ref -2–2)
BILIRUB SERPL-MCNC: 2.8 MG/DL — HIGH (ref 0.2–1.2)
BUN SERPL-MCNC: 15 MG/DL — SIGNIFICANT CHANGE UP (ref 7–23)
CA-I SERPL-SCNC: 1.2 MMOL/L — SIGNIFICANT CHANGE UP (ref 1.12–1.3)
CALCIUM SERPL-MCNC: 10 MG/DL — SIGNIFICANT CHANGE UP (ref 8.4–10.5)
CHLORIDE BLDV-SCNC: 110 MMOL/L — HIGH (ref 96–108)
CHLORIDE SERPL-SCNC: 106 MMOL/L — SIGNIFICANT CHANGE UP (ref 96–108)
CO2 BLDV-SCNC: 30 MMOL/L — SIGNIFICANT CHANGE UP (ref 22–30)
CO2 SERPL-SCNC: 23 MMOL/L — SIGNIFICANT CHANGE UP (ref 22–31)
CREAT SERPL-MCNC: 0.95 MG/DL — SIGNIFICANT CHANGE UP (ref 0.5–1.3)
GAS PNL BLDV: 139 MMOL/L — SIGNIFICANT CHANGE UP (ref 135–145)
GAS PNL BLDV: SIGNIFICANT CHANGE UP
GAS PNL BLDV: SIGNIFICANT CHANGE UP
GLUCOSE BLDC GLUCOMTR-MCNC: 91 MG/DL — SIGNIFICANT CHANGE UP (ref 70–99)
GLUCOSE BLDV-MCNC: 105 MG/DL — HIGH (ref 70–99)
GLUCOSE SERPL-MCNC: 112 MG/DL — HIGH (ref 70–99)
HCO3 BLDV-SCNC: 28 MMOL/L — SIGNIFICANT CHANGE UP (ref 21–29)
HCT VFR BLD CALC: 38.4 % — LOW (ref 39–50)
HCT VFR BLDA CALC: 44 % — SIGNIFICANT CHANGE UP (ref 39–50)
HGB BLD CALC-MCNC: 14.2 G/DL — SIGNIFICANT CHANGE UP (ref 13–17)
HGB BLD-MCNC: 12.3 G/DL — LOW (ref 13–17)
LACTATE BLDV-MCNC: 2.6 MMOL/L — HIGH (ref 0.7–2)
MAGNESIUM SERPL-MCNC: 1.8 MG/DL — SIGNIFICANT CHANGE UP (ref 1.6–2.6)
MCHC RBC-ENTMCNC: 29.4 PG — SIGNIFICANT CHANGE UP (ref 27–34)
MCHC RBC-ENTMCNC: 32 GM/DL — SIGNIFICANT CHANGE UP (ref 32–36)
MCV RBC AUTO: 91.6 FL — SIGNIFICANT CHANGE UP (ref 80–100)
NRBC # BLD: 0 /100 WBCS — SIGNIFICANT CHANGE UP (ref 0–0)
PCO2 BLDV: 49 MMHG — SIGNIFICANT CHANGE UP (ref 35–50)
PH BLDV: 7.38 — SIGNIFICANT CHANGE UP (ref 7.35–7.45)
PHOSPHATE SERPL-MCNC: 2.9 MG/DL — SIGNIFICANT CHANGE UP (ref 2.5–4.5)
PLATELET # BLD AUTO: 141 K/UL — LOW (ref 150–400)
PO2 BLDV: 33 MMHG — SIGNIFICANT CHANGE UP (ref 25–45)
POTASSIUM BLDV-SCNC: 3.8 MMOL/L — SIGNIFICANT CHANGE UP (ref 3.5–5.3)
POTASSIUM SERPL-MCNC: 3.8 MMOL/L — SIGNIFICANT CHANGE UP (ref 3.5–5.3)
POTASSIUM SERPL-SCNC: 3.8 MMOL/L — SIGNIFICANT CHANGE UP (ref 3.5–5.3)
PROT SERPL-MCNC: 7.7 G/DL — SIGNIFICANT CHANGE UP (ref 6–8.3)
RBC # BLD: 4.19 M/UL — LOW (ref 4.2–5.8)
RBC # FLD: 13.6 % — SIGNIFICANT CHANGE UP (ref 10.3–14.5)
SAO2 % BLDV: 54 % — LOW (ref 67–88)
SODIUM SERPL-SCNC: 141 MMOL/L — SIGNIFICANT CHANGE UP (ref 135–145)
TROPONIN T, HIGH SENSITIVITY RESULT: 13 NG/L — SIGNIFICANT CHANGE UP (ref 0–51)
WBC # BLD: 5.19 K/UL — SIGNIFICANT CHANGE UP (ref 3.8–10.5)
WBC # FLD AUTO: 5.19 K/UL — SIGNIFICANT CHANGE UP (ref 3.8–10.5)

## 2021-08-06 PROCEDURE — 99285 EMERGENCY DEPT VISIT HI MDM: CPT

## 2021-08-06 PROCEDURE — 76705 ECHO EXAM OF ABDOMEN: CPT | Mod: 26,RT

## 2021-08-06 PROCEDURE — 71045 X-RAY EXAM CHEST 1 VIEW: CPT | Mod: 26

## 2021-08-06 PROCEDURE — 99223 1ST HOSP IP/OBS HIGH 75: CPT

## 2021-08-06 PROCEDURE — 93010 ELECTROCARDIOGRAM REPORT: CPT

## 2021-08-06 RX ORDER — INSULIN LISPRO 100/ML
VIAL (ML) SUBCUTANEOUS AT BEDTIME
Refills: 0 | Status: DISCONTINUED | OUTPATIENT
Start: 2021-08-06 | End: 2021-08-08

## 2021-08-06 RX ORDER — FAMOTIDINE 10 MG/ML
20 INJECTION INTRAVENOUS ONCE
Refills: 0 | Status: COMPLETED | OUTPATIENT
Start: 2021-08-06 | End: 2021-08-06

## 2021-08-06 RX ORDER — DEXTROSE 50 % IN WATER 50 %
25 SYRINGE (ML) INTRAVENOUS ONCE
Refills: 0 | Status: DISCONTINUED | OUTPATIENT
Start: 2021-08-06 | End: 2021-08-08

## 2021-08-06 RX ORDER — PIPERACILLIN AND TAZOBACTAM 4; .5 G/20ML; G/20ML
3.38 INJECTION, POWDER, LYOPHILIZED, FOR SOLUTION INTRAVENOUS EVERY 8 HOURS
Refills: 0 | Status: DISCONTINUED | OUTPATIENT
Start: 2021-08-06 | End: 2021-08-08

## 2021-08-06 RX ORDER — MECLIZINE HCL 12.5 MG
25 TABLET ORAL EVERY 8 HOURS
Refills: 0 | Status: DISCONTINUED | OUTPATIENT
Start: 2021-08-06 | End: 2021-08-08

## 2021-08-06 RX ORDER — DEXTROSE 50 % IN WATER 50 %
15 SYRINGE (ML) INTRAVENOUS ONCE
Refills: 0 | Status: DISCONTINUED | OUTPATIENT
Start: 2021-08-06 | End: 2021-08-08

## 2021-08-06 RX ORDER — TAMSULOSIN HYDROCHLORIDE 0.4 MG/1
0.4 CAPSULE ORAL AT BEDTIME
Refills: 0 | Status: DISCONTINUED | OUTPATIENT
Start: 2021-08-06 | End: 2021-08-08

## 2021-08-06 RX ORDER — INSULIN LISPRO 100/ML
VIAL (ML) SUBCUTANEOUS
Refills: 0 | Status: DISCONTINUED | OUTPATIENT
Start: 2021-08-06 | End: 2021-08-08

## 2021-08-06 RX ORDER — FAMOTIDINE 10 MG/ML
20 INJECTION INTRAVENOUS DAILY
Refills: 0 | Status: DISCONTINUED | OUTPATIENT
Start: 2021-08-06 | End: 2021-08-08

## 2021-08-06 RX ORDER — ASPIRIN/CALCIUM CARB/MAGNESIUM 324 MG
81 TABLET ORAL DAILY
Refills: 0 | Status: DISCONTINUED | OUTPATIENT
Start: 2021-08-06 | End: 2021-08-07

## 2021-08-06 RX ORDER — ATORVASTATIN CALCIUM 80 MG/1
80 TABLET, FILM COATED ORAL AT BEDTIME
Refills: 0 | Status: DISCONTINUED | OUTPATIENT
Start: 2021-08-06 | End: 2021-08-08

## 2021-08-06 RX ORDER — SODIUM CHLORIDE 9 MG/ML
1000 INJECTION, SOLUTION INTRAVENOUS
Refills: 0 | Status: DISCONTINUED | OUTPATIENT
Start: 2021-08-06 | End: 2021-08-08

## 2021-08-06 RX ORDER — AMLODIPINE BESYLATE 2.5 MG/1
5 TABLET ORAL DAILY
Refills: 0 | Status: DISCONTINUED | OUTPATIENT
Start: 2021-08-06 | End: 2021-08-08

## 2021-08-06 RX ORDER — METFORMIN HYDROCHLORIDE 850 MG/1
1 TABLET ORAL
Qty: 0 | Refills: 0 | DISCHARGE

## 2021-08-06 RX ORDER — ENOXAPARIN SODIUM 100 MG/ML
40 INJECTION SUBCUTANEOUS DAILY
Refills: 0 | Status: DISCONTINUED | OUTPATIENT
Start: 2021-08-06 | End: 2021-08-08

## 2021-08-06 RX ORDER — ASPIRIN/CALCIUM CARB/MAGNESIUM 324 MG
1 TABLET ORAL
Qty: 0 | Refills: 0 | DISCHARGE

## 2021-08-06 RX ORDER — METOPROLOL TARTRATE 50 MG
50 TABLET ORAL EVERY 8 HOURS
Refills: 0 | Status: DISCONTINUED | OUTPATIENT
Start: 2021-08-06 | End: 2021-08-08

## 2021-08-06 RX ORDER — DEXTROSE 50 % IN WATER 50 %
12.5 SYRINGE (ML) INTRAVENOUS ONCE
Refills: 0 | Status: DISCONTINUED | OUTPATIENT
Start: 2021-08-06 | End: 2021-08-08

## 2021-08-06 RX ORDER — ASPIRIN/CALCIUM CARB/MAGNESIUM 324 MG
162 TABLET ORAL ONCE
Refills: 0 | Status: COMPLETED | OUTPATIENT
Start: 2021-08-06 | End: 2021-08-06

## 2021-08-06 RX ORDER — PIPERACILLIN AND TAZOBACTAM 4; .5 G/20ML; G/20ML
3.38 INJECTION, POWDER, LYOPHILIZED, FOR SOLUTION INTRAVENOUS ONCE
Refills: 0 | Status: COMPLETED | OUTPATIENT
Start: 2021-08-06 | End: 2021-08-06

## 2021-08-06 RX ORDER — ACETAMINOPHEN 500 MG
650 TABLET ORAL EVERY 6 HOURS
Refills: 0 | Status: DISCONTINUED | OUTPATIENT
Start: 2021-08-06 | End: 2021-08-08

## 2021-08-06 RX ORDER — GLUCAGON INJECTION, SOLUTION 0.5 MG/.1ML
1 INJECTION, SOLUTION SUBCUTANEOUS ONCE
Refills: 0 | Status: DISCONTINUED | OUTPATIENT
Start: 2021-08-06 | End: 2021-08-08

## 2021-08-06 RX ORDER — AMLODIPINE BESYLATE 2.5 MG/1
1 TABLET ORAL
Qty: 0 | Refills: 0 | DISCHARGE

## 2021-08-06 RX ADMIN — ATORVASTATIN CALCIUM 80 MILLIGRAM(S): 80 TABLET, FILM COATED ORAL at 23:17

## 2021-08-06 RX ADMIN — TAMSULOSIN HYDROCHLORIDE 0.4 MILLIGRAM(S): 0.4 CAPSULE ORAL at 23:16

## 2021-08-06 RX ADMIN — PIPERACILLIN AND TAZOBACTAM 200 GRAM(S): 4; .5 INJECTION, POWDER, LYOPHILIZED, FOR SOLUTION INTRAVENOUS at 17:21

## 2021-08-06 RX ADMIN — Medication 50 MILLIGRAM(S): at 23:16

## 2021-08-06 RX ADMIN — FAMOTIDINE 20 MILLIGRAM(S): 10 INJECTION INTRAVENOUS at 14:07

## 2021-08-06 RX ADMIN — Medication 30 MILLILITER(S): at 14:07

## 2021-08-06 RX ADMIN — Medication 162 MILLIGRAM(S): at 14:07

## 2021-08-06 RX ADMIN — PIPERACILLIN AND TAZOBACTAM 25 GRAM(S): 4; .5 INJECTION, POWDER, LYOPHILIZED, FOR SOLUTION INTRAVENOUS at 23:17

## 2021-08-06 NOTE — H&P ADULT - NSICDXPASTSURGICALHX_GEN_ALL_CORE_FT
PAST SURGICAL HISTORY:  H/O left inguinal hernia repair     S/P appendectomy     S/P excision of ganglion cyst

## 2021-08-06 NOTE — H&P ADULT - NSHPPHYSICALEXAM_GEN_ALL_CORE
T(C): 36.7 (08-06-21 @ 20:35), Max: 36.7 (08-06-21 @ 11:31)  HR: 66 (08-06-21 @ 20:35) (62 - 80)  BP: 164/74 (08-06-21 @ 20:35) (142/89 - 164/74)  RR: 16 (08-06-21 @ 20:35) (16 - 18)  SpO2: 98% (08-06-21 @ 20:35) (95% - 100%)    GEN: (fe)male in NAD, appears comfortable, no diaphoresis  EYES: No scleral injection, PERRL, EOMI  ENTM: neck supple & symmetric without tracheal deviation, moist membranes, no gross hearing impairment, thyroid gland not enlarged  CV: +S1/S2, no m/r/g, no abdominal bruit, no LE edema  RESP: breathing comfortably, no respiratory accessory muscle use, CTAB, no w/r/r  GI: normoactive BS, soft, NTND, no rebounding/guarding, no palpable masses  LYMPHATICS: no LAD or tenderness to palpation  NEURO: AOx3, no focal deficits, CNII-XII grossly intact  PSYCH: No SI/HI/AVH, appropriate affect, appropriate insight/judgment   SKIN: no petechiae, ecchymosis or maculopapular rash noted T(C): 36.7 (08-06-21 @ 20:35), Max: 36.7 (08-06-21 @ 11:31)  HR: 66 (08-06-21 @ 20:35) (62 - 80)  BP: 164/74 (08-06-21 @ 20:35) (142/89 - 164/74)  RR: 16 (08-06-21 @ 20:35) (16 - 18)  SpO2: 98% (08-06-21 @ 20:35) (95% - 100%)    GEN: male in NAD, appears comfortable, no diaphoresis  EYES: Slight scleral injection, PERRL, EOMI  ENTM: neck supple & symmetric without tracheal deviation, moist membranes, no gross hearing impairment, thyroid gland not enlarged  CV: +S1/S2, no m/r/g, no abdominal bruit, no LE edema  RESP: breathing comfortably, no respiratory accessory muscle use, CTAB, no w/r/r  GI: normoactive BS, soft, ND, no rebounding/guarding, no palpable masses, +tender to deep palpation in RUQ  LYMPHATICS: no LAD or tenderness to palpation  NEURO: AOx3, no focal deficits, CNII-XII grossly intact  PSYCH: No SI/HI/AVH, appropriate affect, appropriate insight/judgment   SKIN: no petechiae, ecchymosis or maculopapular rash noted

## 2021-08-06 NOTE — ED ADULT NURSE REASSESSMENT NOTE - NS ED NURSE REASSESS COMMENT FT1
Report received from ANIYAH Sams. Pt resting in stretcher, a&ox3, nad, breathing spontaneous and nonlabored, able to move all extremities and follow commands. Pt denies CP or abdominal pain at this time. Pending bed

## 2021-08-06 NOTE — H&P ADULT - HISTORY OF PRESENT ILLNESS
77M with PMHx of HTN, T2DM, BPH, and CAD (post-CABG 12/2020) presenting with two day history of epigastric pain with radiation to the back. Patient states pain occurred suddenly and was severe and constant and described as sharp. He denies any aggravating or alleviating factors. He endorses loss of appetite and feeling nauseous at times. He states his urine has been darker, but stool color and consistency unchanged. He is unsure if he more tan than usual, but does appear slightly more jaundice. Patient denies fever or chills. In the ED he had labs showing cholestatic transaminitis and RUQ sono showing cholelithiasis, & wall thickening. Patient given Zosyn and Pepcid. Patient seen by general surgery. Patient with no complaints currently, states pain is improved (despite no intervention).

## 2021-08-06 NOTE — ED ADULT NURSE NOTE - OBJECTIVE STATEMENT
Pt is a 76 year old male c/o chest pain.  Pt denies any other complaints.  PT on cardiac monitor.  NSR noted.  Pt respirations even and unlabored.  Pt skin warm dry and intact.  Son at bedside.  Line placed and labs sent.  PT medicated per 's orders.

## 2021-08-06 NOTE — ED PROVIDER NOTE - ATTENDING CONTRIBUTION TO CARE
pt is a 77 y/o male with chest pain to epigastric area after eating stabbing to back with nausea, no vomiting, no cp or sob. ekg with no changes, atypical chest pain work up. labs, cxr, gi cocktail.

## 2021-08-06 NOTE — H&P ADULT - NSHPADDITIONALINFOADULT_GEN_ALL_CORE
Admitted on behalf of Dr. Erica Ann  Pager: [phone]  Off-Hours: [phone]  Available on MS Teams

## 2021-08-06 NOTE — ED PROVIDER NOTE - PHYSICAL EXAMINATION
GENERAL: Awake, alert, NAD  HEENT: NC/AT, moist mucous membranes, PERRL, EOMI  LUNGS: CTAB, no wheezes or crackles   CARDIAC: RRR, no m/r/g  ABDOMEN: Soft, normal BS, non distended, no rebound, no guarding. epigastrium mildly tender to palpation.   BACK: No midline spinal tenderness, no CVA tenderness  EXT: No edema, no calf tenderness, 2+ DP pulses bilaterally, no deformities.  NEURO: A&Ox3. Moving all extremities. no FND.   SKIN: Warm and dry. No rash.  PSYCH: Normal affect.

## 2021-08-06 NOTE — ED PROVIDER NOTE - OBJECTIVE STATEMENT
76 year old M PMH HTN, HLD, DM2, BPH and OA S/P CABG x 3 12/17/20 p/w epigastric pain radiating to spine x2 days. [place] Associated with some nausea, no vomiting. Denies prior history of such pain. No associated fevers or diarrhea. Denies CP, SOB, lightheadedness, syncope. No history of abdominal surgeries.

## 2021-08-06 NOTE — ED PROVIDER NOTE - PROGRESS NOTE DETAILS
Arely Yuen PGY-2: Surgery paged. RUQ c/w cholecystitis. Arely Yuen PGY-2: Started on Zosyn. Surgery said concern for choledocholithiasis given RUQ findings and elevated LFTs. To send indirect bili. Will likely need EUS or MRCP.

## 2021-08-06 NOTE — H&P ADULT - NSICDXPASTMEDICALHX_GEN_ALL_CORE_FT
PAST MEDICAL HISTORY:  Aneurysm of right leg     BPH (benign prostatic hyperplasia)     CAD in native artery     HLD (hyperlipidemia)     HTN (hypertension)     OA (osteoarthritis)     Osteoarthritis     Type 2 diabetes mellitus

## 2021-08-06 NOTE — CONSULT NOTE ADULT - ASSESSMENT
Assessment:  76y Male with above PMH/PSH, recent CABG in 12/2020, presented to ED with 2 days of abdominal pain which has resolved now. No fever or leukocytosis. Workup so far is equivocal for cholecystitis. LFT is elevated suggesting possible choledocholithiasis, possible passed stone. RUQ US with GBW thickening, trace pericholecystic fluid, CBD normal. Overall impression, cholecystitis vs choledocholithiasis.    Plan:  - medical and cardiology optimization  - GI workup for possible choledocho, consider MRCP and HIDA scan  - continue IV zosyn  - will follow and plan for lap jose once cleared    amado Powers Assessment:  76y Male with above PMH/PSH, recent CABG in 12/2020, presented to ED with 2 days of abdominal pain which has resolved now. No fever or leukocytosis. Workup so far is equivocal for cholecystitis. LFT is elevated suggesting possible choledocholithiasis, possible passed stone. RUQ US with GBW thickening, trace pericholecystic fluid, CBD normal. Overall impression, cholecystitis vs choledocholithiasis.    Plan:  - medical and cardiology optimization  - GI workup for possible choledocho, consider MRCP and HIDA scan  - continue IV zosyn  - will follow and plan for lap jose once cleared    dw Dr Powers    [id number]

## 2021-08-06 NOTE — ED PROVIDER NOTE - PSH
H/O left inguinal hernia repair    S/P appendectomy    S/P appendectomy    S/P excision of ganglion cyst    S/P left inguinal hernia repair

## 2021-08-06 NOTE — H&P ADULT - NSHPREVIEWOFSYSTEMS_GEN_ALL_CORE
GEN: no night sweats or change in appetite  EYES: no changes in vision or diplopia   ENT: no epistaxis, sinus pain, gingival bleeding, odynophagia or dysphagia  CV: no CP, PND or palpitations  RESP: no cough, wheezing, or hemoptysis  GI: no hematemesis, hematochezia, or melena  : no dysuria, polyuria, or hematuria  MSK: no arthralgias or joint swelling   NEURO: no gross sensory changes, numbness, focal deficits  PSYCH: no depression or changes in concentration  HEME/ONC: no purpura, petechiae or night sweats  SKIN: no pruritus, hair loss or skin lesions  ALL: no photosensitivity, no complaints of anaphylaxis (SOB, throat swelling) GEN: no night sweats; +loss of appetite   EYES: no changes in vision or diplopia   ENT: no epistaxis, sinus pain, gingival bleeding, odynophagia or dysphagia  CV: no CP, PND or palpitations  RESP: no cough, wheezing, or hemoptysis  GI: no hematemesis, hematochezia, or melena; +n/v  : no dysuria, polyuria, or hematuria  MSK: no arthralgias or joint swelling   NEURO: no gross sensory changes, numbness, focal deficits  PSYCH: no depression or changes in concentration  HEME/ONC: no purpura, petechiae or night sweats  SKIN: no pruritus, hair loss or skin lesions  ALL: no photosensitivity, no complaints of anaphylaxis (SOB, throat swelling)

## 2021-08-06 NOTE — H&P ADULT - ASSESSMENT
77M with PMHx of HTN, T2DM, BPH, and CAD (post-CABG 12/2020) presenting with two day history of epigastric pain with radiation to the back. Labs significant for cholestatic transaminitis and on exam patient with RUQ pain to palpation. RUQ sono showing cholelithiasis, & wall thickening. Clinically there is concern for choledocholithiasis and or cholecystitis.    Rule Out Choledocholithiasis -  clinically it seems this is cholecystitis with RUQ sono showing no biliary dilation, but does not necessarily rule out a passed stone. We could perform a HIDA scan to rule out cholecystitis, but this would not necessarily rule out choledocholithiasis. Either way patient would need cholecystectomy on this admission. Thus, it makes the most sense to rule out choledocholithiasis incase an ERCP is needed.  - Perform ERCP  - Cover with Zosyn  - Follow up with surgery regarding timing of cholecystectomy    CAD (post-CABG)  - Continue with baby aspirin, statin and beta blocker  - Follows with [facility]    BPH  - Continue with Flomax    T2DM  - Hold home metformin  - FS TID AC & insulin sliding scale     HTN  - Continue to monitor, if elevated start ACEI    Preoperative Examination  - Patient with good functional status and able to achieve >4 METs. Laparoscopic cholecystectomy likely moderate risk surgery; therefore, patient requires no further testing. There is no decompensated heart condition. Maintain baby aspirin and metoprolol gwendolyn-operatively if possible 77M with PMHx of HTN, T2DM, BPH, and CAD (post-CABG 12/2020) presenting with two day history of epigastric pain with radiation to the back. Labs significant for cholestatic transaminitis and on exam patient with RUQ pain to palpation. RUQ sono showing cholelithiasis, & wall thickening. Clinically there is concern for choledocholithiasis and or cholecystitis.    Rule Out Choledocholithiasis -  clinically it seems this is cholecystitis with RUQ sono showing no biliary dilation, but does not necessarily rule out a passed stone. We could perform a HIDA scan to rule out cholecystitis, but this would not necessarily rule out choledocholithiasis. Either way patient would need cholecystectomy on this admission. Thus, it makes the most sense to rule out choledocholithiasis incase an ERCP is needed.  - Perform MRCP (HIDA if requested by surgery)  - Cover with Zosyn  - Follow up with surgery regarding timing of cholecystectomy    CAD (post-CABG)  - Continue with baby aspirin, statin and beta blocker  - Follows with [facility]    BPH  - Continue with Flomax    T2DM  - Hold home metformin  - FS TID AC & insulin sliding scale     HTN  - Continue to monitor, if elevated start ACEI    Preoperative Examination  - Patient with good functional status and able to achieve >4 METs. Laparoscopic cholecystectomy likely moderate risk surgery; therefore, patient requires no further testing. There is no decompensated heart condition. Maintain baby aspirin and metoprolol gwendolyn-operatively if possible

## 2021-08-07 ENCOUNTER — TRANSCRIPTION ENCOUNTER (OUTPATIENT)
Age: 76
End: 2021-08-07

## 2021-08-07 LAB
A1C WITH ESTIMATED AVERAGE GLUCOSE RESULT: 5.9 % — HIGH (ref 4–5.6)
ALBUMIN SERPL ELPH-MCNC: 3.5 G/DL — SIGNIFICANT CHANGE UP (ref 3.3–5)
ALP SERPL-CCNC: 280 U/L — HIGH (ref 40–120)
ALT FLD-CCNC: 210 U/L — HIGH (ref 10–45)
ANION GAP SERPL CALC-SCNC: 11 MMOL/L — SIGNIFICANT CHANGE UP (ref 5–17)
AST SERPL-CCNC: 169 U/L — HIGH (ref 10–40)
BILIRUB SERPL-MCNC: 1.4 MG/DL — HIGH (ref 0.2–1.2)
BLD GP AB SCN SERPL QL: NEGATIVE — SIGNIFICANT CHANGE UP
BUN SERPL-MCNC: 17 MG/DL — SIGNIFICANT CHANGE UP (ref 7–23)
CALCIUM SERPL-MCNC: 9.5 MG/DL — SIGNIFICANT CHANGE UP (ref 8.4–10.5)
CHLORIDE SERPL-SCNC: 106 MMOL/L — SIGNIFICANT CHANGE UP (ref 96–108)
CO2 SERPL-SCNC: 23 MMOL/L — SIGNIFICANT CHANGE UP (ref 22–31)
CREAT SERPL-MCNC: 1.02 MG/DL — SIGNIFICANT CHANGE UP (ref 0.5–1.3)
ESTIMATED AVERAGE GLUCOSE: 123 MG/DL — HIGH (ref 68–114)
GLUCOSE BLDC GLUCOMTR-MCNC: 139 MG/DL — HIGH (ref 70–99)
GLUCOSE BLDC GLUCOMTR-MCNC: 155 MG/DL — HIGH (ref 70–99)
GLUCOSE BLDC GLUCOMTR-MCNC: 95 MG/DL — SIGNIFICANT CHANGE UP (ref 70–99)
GLUCOSE BLDC GLUCOMTR-MCNC: 95 MG/DL — SIGNIFICANT CHANGE UP (ref 70–99)
GLUCOSE SERPL-MCNC: 111 MG/DL — HIGH (ref 70–99)
HCT VFR BLD CALC: 38.5 % — LOW (ref 39–50)
HGB BLD-MCNC: 12.7 G/DL — LOW (ref 13–17)
MCHC RBC-ENTMCNC: 29.7 PG — SIGNIFICANT CHANGE UP (ref 27–34)
MCHC RBC-ENTMCNC: 33 GM/DL — SIGNIFICANT CHANGE UP (ref 32–36)
MCV RBC AUTO: 90 FL — SIGNIFICANT CHANGE UP (ref 80–100)
NRBC # BLD: 0 /100 WBCS — SIGNIFICANT CHANGE UP (ref 0–0)
PLATELET # BLD AUTO: 135 K/UL — LOW (ref 150–400)
POTASSIUM SERPL-MCNC: 3.7 MMOL/L — SIGNIFICANT CHANGE UP (ref 3.5–5.3)
POTASSIUM SERPL-SCNC: 3.7 MMOL/L — SIGNIFICANT CHANGE UP (ref 3.5–5.3)
PROT SERPL-MCNC: 7.1 G/DL — SIGNIFICANT CHANGE UP (ref 6–8.3)
RBC # BLD: 4.28 M/UL — SIGNIFICANT CHANGE UP (ref 4.2–5.8)
RBC # FLD: 13.5 % — SIGNIFICANT CHANGE UP (ref 10.3–14.5)
RH IG SCN BLD-IMP: POSITIVE — SIGNIFICANT CHANGE UP
SARS-COV-2 RNA SPEC QL NAA+PROBE: SIGNIFICANT CHANGE UP
SODIUM SERPL-SCNC: 140 MMOL/L — SIGNIFICANT CHANGE UP (ref 135–145)
WBC # BLD: 3.43 K/UL — LOW (ref 3.8–10.5)
WBC # FLD AUTO: 3.43 K/UL — LOW (ref 3.8–10.5)

## 2021-08-07 PROCEDURE — 78227 HEPATOBIL SYST IMAGE W/DRUG: CPT | Mod: 26

## 2021-08-07 RX ORDER — SODIUM CHLORIDE 9 MG/ML
1000 INJECTION INTRAMUSCULAR; INTRAVENOUS; SUBCUTANEOUS
Refills: 0 | Status: DISCONTINUED | OUTPATIENT
Start: 2021-08-08 | End: 2021-08-08

## 2021-08-07 RX ADMIN — Medication 50 MILLIGRAM(S): at 06:31

## 2021-08-07 RX ADMIN — Medication 50 MILLIGRAM(S): at 13:50

## 2021-08-07 RX ADMIN — PIPERACILLIN AND TAZOBACTAM 25 GRAM(S): 4; .5 INJECTION, POWDER, LYOPHILIZED, FOR SOLUTION INTRAVENOUS at 22:13

## 2021-08-07 RX ADMIN — ENOXAPARIN SODIUM 40 MILLIGRAM(S): 100 INJECTION SUBCUTANEOUS at 11:50

## 2021-08-07 RX ADMIN — Medication 81 MILLIGRAM(S): at 11:51

## 2021-08-07 RX ADMIN — TAMSULOSIN HYDROCHLORIDE 0.4 MILLIGRAM(S): 0.4 CAPSULE ORAL at 22:14

## 2021-08-07 RX ADMIN — Medication 1: at 13:49

## 2021-08-07 RX ADMIN — AMLODIPINE BESYLATE 5 MILLIGRAM(S): 2.5 TABLET ORAL at 06:31

## 2021-08-07 RX ADMIN — FAMOTIDINE 20 MILLIGRAM(S): 10 INJECTION INTRAVENOUS at 11:51

## 2021-08-07 RX ADMIN — PIPERACILLIN AND TAZOBACTAM 25 GRAM(S): 4; .5 INJECTION, POWDER, LYOPHILIZED, FOR SOLUTION INTRAVENOUS at 13:50

## 2021-08-07 RX ADMIN — Medication 50 MILLIGRAM(S): at 22:13

## 2021-08-07 RX ADMIN — ATORVASTATIN CALCIUM 80 MILLIGRAM(S): 80 TABLET, FILM COATED ORAL at 22:14

## 2021-08-07 RX ADMIN — PIPERACILLIN AND TAZOBACTAM 25 GRAM(S): 4; .5 INJECTION, POWDER, LYOPHILIZED, FOR SOLUTION INTRAVENOUS at 06:30

## 2021-08-07 NOTE — PROGRESS NOTE ADULT - SUBJECTIVE AND OBJECTIVE BOX
Patient is a 76y old  Male who presents with a chief complaint of Epigastric Pain (06 Aug 2021 21:40)      SUBJECTIVE / OVERNIGHT EVENTS: c/o RUQ discomfort.   Review of Systems  chest pain no  palpitations no  sob no  nausea no  headache no    MEDICATIONS  (STANDING):  amLODIPine   Tablet 5 milliGRAM(s) Oral daily  aspirin enteric coated 81 milliGRAM(s) Oral daily  atorvastatin 80 milliGRAM(s) Oral at bedtime  dextrose 40% Gel 15 Gram(s) Oral once  dextrose 5%. 1000 milliLiter(s) (50 mL/Hr) IV Continuous <Continuous>  dextrose 5%. 1000 milliLiter(s) (100 mL/Hr) IV Continuous <Continuous>  dextrose 50% Injectable 25 Gram(s) IV Push once  dextrose 50% Injectable 12.5 Gram(s) IV Push once  dextrose 50% Injectable 25 Gram(s) IV Push once  enoxaparin Injectable 40 milliGRAM(s) SubCutaneous daily  famotidine    Tablet 20 milliGRAM(s) Oral daily  glucagon  Injectable 1 milliGRAM(s) IntraMuscular once  insulin lispro (ADMELOG) corrective regimen sliding scale   SubCutaneous three times a day before meals  insulin lispro (ADMELOG) corrective regimen sliding scale   SubCutaneous at bedtime  metoprolol tartrate 50 milliGRAM(s) Oral every 8 hours  piperacillin/tazobactam IVPB.. 3.375 Gram(s) IV Intermittent every 8 hours  tamsulosin 0.4 milliGRAM(s) Oral at bedtime    MEDICATIONS  (PRN):  acetaminophen   Tablet .. 650 milliGRAM(s) Oral every 6 hours PRN Temp greater or equal to 38C (100.4F), Mild Pain (1 - 3), Moderate Pain (4 - 6), Severe Pain (7 - 10)  aluminum hydroxide/magnesium hydroxide/simethicone Suspension 30 milliLiter(s) Oral every 4 hours PRN Dyspepsia  meclizine 25 milliGRAM(s) Oral every 8 hours PRN Dizziness      Vital Signs Last 24 Hrs  T(C): 36.4 (07 Aug 2021 09:37), Max: 36.9 (07 Aug 2021 06:42)  T(F): 97.5 (07 Aug 2021 09:37), Max: 98.4 (07 Aug 2021 06:42)  HR: 55 (07 Aug 2021 09:37) (55 - 74)  BP: 144/76 (07 Aug 2021 09:37) (135/71 - 167/96)  BP(mean): --  RR: 18 (07 Aug 2021 09:37) (16 - 18)  SpO2: 96% (07 Aug 2021 09:37) (96% - 100%)    PHYSICAL EXAM:  GENERAL: NAD, well-developed  HEAD:  Atraumatic, Normocephalic  EYES: EOMI, PERRLA, conjunctiva and sclera clear  NECK: Supple, No JVD  CHEST/LUNG: Clear to auscultation bilaterally; No wheeze  HEART: Regular rate and rhythm; No murmurs, rubs, or gallops  ABDOMEN: Soft, Nontender, Nondistended; Bowel sounds present RUQ discomfort.  EXTREMITIES:  2+ Peripheral Pulses, No clubbing, cyanosis, or edema  PSYCH: AAOx3  NEUROLOGY: non-focal  SKIN: No rashes or lesions    LABS:                        12.7   3.43  )-----------( 135      ( 07 Aug 2021 07:39 )             38.5     08-07    140  |  106  |  17  ----------------------------<  111<H>  3.7   |  23  |  1.02    Ca    9.5      07 Aug 2021 07:18  Phos  2.9     08-06  Mg     1.8     08-06    TPro  7.1  /  Alb  3.5  /  TBili  1.4<H>  /  DBili  x   /  AST  169<H>  /  ALT  210<H>  /  AlkPhos  280<H>  08-07                RADIOLOGY & ADDITIONAL TESTS:    Imaging Personally Reviewed:    Consultant(s) Notes Reviewed:      Care Discussed with Consultants/Other Providers:

## 2021-08-07 NOTE — CONSULT NOTE ADULT - ASSESSMENT
inc lfts  ? gb disease    plan  in an dout  ppi once a day  hida scan  hepatitis serology  and mrcp to be done  surgery on the case

## 2021-08-07 NOTE — PROGRESS NOTE ADULT - ASSESSMENT
77M with PMHx of HTN, T2DM, BPH, and CAD (post-CABG 12/2020) presenting with two day history of epigastric pain with radiation to the back. Labs significant for cholestatic transaminitis and on exam patient with RUQ pain to palpation. RUQ sono showing cholelithiasis, & wall thickening. Clinically there is concern for choledocholithiasis and or cholecystitis.    Rule Out Choledocholithiasis  - Perform MRCP (HIDA if requested by surgery)  - Cover with Zosyn  - Follow up with surgery regarding timing of cholecystectomy  - GI evaluation Dr. Quijano called    CAD (post-CABG)  - Continue with baby aspirin, statin and beta blocker  - Cardiology evaluation Dr. Hill    BPH  - Continue with Flomax    T2DM  - Hold home metformin  - FS TID AC & insulin sliding scale     HTN  - Continue to monitor, if elevated start ACEI    DVT prophylaxis    Alessandro Maldonado MD pager [phone]

## 2021-08-08 ENCOUNTER — RESULT REVIEW (OUTPATIENT)
Age: 76
End: 2021-08-08

## 2021-08-08 LAB
ALBUMIN SERPL ELPH-MCNC: 3.3 G/DL — SIGNIFICANT CHANGE UP (ref 3.3–5)
ALP SERPL-CCNC: 268 U/L — HIGH (ref 40–120)
ALT FLD-CCNC: 170 U/L — HIGH (ref 10–45)
ANION GAP SERPL CALC-SCNC: 13 MMOL/L — SIGNIFICANT CHANGE UP (ref 5–17)
APTT BLD: 32.4 SEC — SIGNIFICANT CHANGE UP (ref 27.5–35.5)
AST SERPL-CCNC: 134 U/L — HIGH (ref 10–40)
BILIRUB SERPL-MCNC: 0.7 MG/DL — SIGNIFICANT CHANGE UP (ref 0.2–1.2)
BUN SERPL-MCNC: 19 MG/DL — SIGNIFICANT CHANGE UP (ref 7–23)
CALCIUM SERPL-MCNC: 9.4 MG/DL — SIGNIFICANT CHANGE UP (ref 8.4–10.5)
CHLORIDE SERPL-SCNC: 106 MMOL/L — SIGNIFICANT CHANGE UP (ref 96–108)
CO2 SERPL-SCNC: 22 MMOL/L — SIGNIFICANT CHANGE UP (ref 22–31)
CREAT SERPL-MCNC: 1.49 MG/DL — HIGH (ref 0.5–1.3)
GLUCOSE BLDC GLUCOMTR-MCNC: 108 MG/DL — HIGH (ref 70–99)
GLUCOSE BLDC GLUCOMTR-MCNC: 118 MG/DL — HIGH (ref 70–99)
GLUCOSE BLDC GLUCOMTR-MCNC: 93 MG/DL — SIGNIFICANT CHANGE UP (ref 70–99)
GLUCOSE SERPL-MCNC: 116 MG/DL — HIGH (ref 70–99)
HAV IGM SER-ACNC: SIGNIFICANT CHANGE UP
HBV CORE IGM SER-ACNC: SIGNIFICANT CHANGE UP
HBV SURFACE AG SER-ACNC: SIGNIFICANT CHANGE UP
HCT VFR BLD CALC: 38.3 % — LOW (ref 39–50)
HCV AB S/CO SERPL IA: 0.12 S/CO — SIGNIFICANT CHANGE UP (ref 0–0.99)
HCV AB SERPL-IMP: SIGNIFICANT CHANGE UP
HGB BLD-MCNC: 12.5 G/DL — LOW (ref 13–17)
INR BLD: 0.93 RATIO — SIGNIFICANT CHANGE UP (ref 0.88–1.16)
MCHC RBC-ENTMCNC: 29.3 PG — SIGNIFICANT CHANGE UP (ref 27–34)
MCHC RBC-ENTMCNC: 32.6 GM/DL — SIGNIFICANT CHANGE UP (ref 32–36)
MCV RBC AUTO: 89.9 FL — SIGNIFICANT CHANGE UP (ref 80–100)
NRBC # BLD: 0 /100 WBCS — SIGNIFICANT CHANGE UP (ref 0–0)
PLATELET # BLD AUTO: 139 K/UL — LOW (ref 150–400)
POTASSIUM SERPL-MCNC: 3.7 MMOL/L — SIGNIFICANT CHANGE UP (ref 3.5–5.3)
POTASSIUM SERPL-SCNC: 3.7 MMOL/L — SIGNIFICANT CHANGE UP (ref 3.5–5.3)
PROT SERPL-MCNC: 6.8 G/DL — SIGNIFICANT CHANGE UP (ref 6–8.3)
PROTHROM AB SERPL-ACNC: 11.2 SEC — SIGNIFICANT CHANGE UP (ref 10.6–13.6)
RBC # BLD: 4.26 M/UL — SIGNIFICANT CHANGE UP (ref 4.2–5.8)
RBC # FLD: 13.5 % — SIGNIFICANT CHANGE UP (ref 10.3–14.5)
SODIUM SERPL-SCNC: 141 MMOL/L — SIGNIFICANT CHANGE UP (ref 135–145)
WBC # BLD: 3.5 K/UL — LOW (ref 3.8–10.5)
WBC # FLD AUTO: 3.5 K/UL — LOW (ref 3.8–10.5)

## 2021-08-08 PROCEDURE — 47562 LAPAROSCOPIC CHOLECYSTECTOMY: CPT

## 2021-08-08 RX ORDER — INSULIN LISPRO 100/ML
VIAL (ML) SUBCUTANEOUS
Refills: 0 | Status: DISCONTINUED | OUTPATIENT
Start: 2021-08-08 | End: 2021-08-09

## 2021-08-08 RX ORDER — DEXTROSE 50 % IN WATER 50 %
15 SYRINGE (ML) INTRAVENOUS ONCE
Refills: 0 | Status: DISCONTINUED | OUTPATIENT
Start: 2021-08-08 | End: 2021-08-09

## 2021-08-08 RX ORDER — MECLIZINE HCL 12.5 MG
25 TABLET ORAL EVERY 8 HOURS
Refills: 0 | Status: DISCONTINUED | OUTPATIENT
Start: 2021-08-08 | End: 2021-08-09

## 2021-08-08 RX ORDER — SODIUM CHLORIDE 9 MG/ML
1000 INJECTION, SOLUTION INTRAVENOUS
Refills: 0 | Status: DISCONTINUED | OUTPATIENT
Start: 2021-08-08 | End: 2021-08-09

## 2021-08-08 RX ORDER — FENTANYL CITRATE 50 UG/ML
50 INJECTION INTRAVENOUS
Refills: 0 | Status: DISCONTINUED | OUTPATIENT
Start: 2021-08-08 | End: 2021-08-08

## 2021-08-08 RX ORDER — INSULIN LISPRO 100/ML
VIAL (ML) SUBCUTANEOUS EVERY 6 HOURS
Refills: 0 | Status: DISCONTINUED | OUTPATIENT
Start: 2021-08-08 | End: 2021-08-08

## 2021-08-08 RX ORDER — HYDRALAZINE HCL 50 MG
5 TABLET ORAL ONCE
Refills: 0 | Status: COMPLETED | OUTPATIENT
Start: 2021-08-08 | End: 2021-08-08

## 2021-08-08 RX ORDER — GLUCAGON INJECTION, SOLUTION 0.5 MG/.1ML
1 INJECTION, SOLUTION SUBCUTANEOUS ONCE
Refills: 0 | Status: DISCONTINUED | OUTPATIENT
Start: 2021-08-08 | End: 2021-08-09

## 2021-08-08 RX ORDER — ENOXAPARIN SODIUM 100 MG/ML
40 INJECTION SUBCUTANEOUS EVERY 24 HOURS
Refills: 0 | Status: DISCONTINUED | OUTPATIENT
Start: 2021-08-09 | End: 2021-08-09

## 2021-08-08 RX ORDER — METOPROLOL TARTRATE 50 MG
50 TABLET ORAL EVERY 8 HOURS
Refills: 0 | Status: DISCONTINUED | OUTPATIENT
Start: 2021-08-08 | End: 2021-08-09

## 2021-08-08 RX ORDER — ATORVASTATIN CALCIUM 80 MG/1
80 TABLET, FILM COATED ORAL AT BEDTIME
Refills: 0 | Status: DISCONTINUED | OUTPATIENT
Start: 2021-08-08 | End: 2021-08-09

## 2021-08-08 RX ORDER — ASPIRIN/CALCIUM CARB/MAGNESIUM 324 MG
81 TABLET ORAL DAILY
Refills: 0 | Status: DISCONTINUED | OUTPATIENT
Start: 2021-08-09 | End: 2021-08-09

## 2021-08-08 RX ORDER — DEXTROSE 50 % IN WATER 50 %
25 SYRINGE (ML) INTRAVENOUS ONCE
Refills: 0 | Status: DISCONTINUED | OUTPATIENT
Start: 2021-08-08 | End: 2021-08-09

## 2021-08-08 RX ORDER — FAMOTIDINE 10 MG/ML
20 INJECTION INTRAVENOUS DAILY
Refills: 0 | Status: DISCONTINUED | OUTPATIENT
Start: 2021-08-08 | End: 2021-08-09

## 2021-08-08 RX ORDER — INSULIN LISPRO 100/ML
VIAL (ML) SUBCUTANEOUS AT BEDTIME
Refills: 0 | Status: DISCONTINUED | OUTPATIENT
Start: 2021-08-08 | End: 2021-08-09

## 2021-08-08 RX ORDER — FENTANYL CITRATE 50 UG/ML
25 INJECTION INTRAVENOUS
Refills: 0 | Status: DISCONTINUED | OUTPATIENT
Start: 2021-08-08 | End: 2021-08-08

## 2021-08-08 RX ORDER — ONDANSETRON 8 MG/1
4 TABLET, FILM COATED ORAL ONCE
Refills: 0 | Status: DISCONTINUED | OUTPATIENT
Start: 2021-08-08 | End: 2021-08-08

## 2021-08-08 RX ORDER — TAMSULOSIN HYDROCHLORIDE 0.4 MG/1
0.4 CAPSULE ORAL AT BEDTIME
Refills: 0 | Status: DISCONTINUED | OUTPATIENT
Start: 2021-08-08 | End: 2021-08-09

## 2021-08-08 RX ORDER — AMLODIPINE BESYLATE 2.5 MG/1
5 TABLET ORAL DAILY
Refills: 0 | Status: DISCONTINUED | OUTPATIENT
Start: 2021-08-08 | End: 2021-08-09

## 2021-08-08 RX ORDER — DEXTROSE 50 % IN WATER 50 %
12.5 SYRINGE (ML) INTRAVENOUS ONCE
Refills: 0 | Status: DISCONTINUED | OUTPATIENT
Start: 2021-08-08 | End: 2021-08-09

## 2021-08-08 RX ADMIN — AMLODIPINE BESYLATE 5 MILLIGRAM(S): 2.5 TABLET ORAL at 06:09

## 2021-08-08 RX ADMIN — PIPERACILLIN AND TAZOBACTAM 25 GRAM(S): 4; .5 INJECTION, POWDER, LYOPHILIZED, FOR SOLUTION INTRAVENOUS at 06:10

## 2021-08-08 RX ADMIN — TAMSULOSIN HYDROCHLORIDE 0.4 MILLIGRAM(S): 0.4 CAPSULE ORAL at 21:30

## 2021-08-08 RX ADMIN — Medication 50 MILLIGRAM(S): at 20:38

## 2021-08-08 RX ADMIN — Medication 5 MILLIGRAM(S): at 21:13

## 2021-08-08 RX ADMIN — SODIUM CHLORIDE 50 MILLILITER(S): 9 INJECTION INTRAMUSCULAR; INTRAVENOUS; SUBCUTANEOUS at 13:35

## 2021-08-08 RX ADMIN — Medication 50 MILLIGRAM(S): at 06:09

## 2021-08-08 RX ADMIN — FAMOTIDINE 20 MILLIGRAM(S): 10 INJECTION INTRAVENOUS at 13:36

## 2021-08-08 RX ADMIN — Medication 50 MILLIGRAM(S): at 13:59

## 2021-08-08 RX ADMIN — FENTANYL CITRATE 50 MICROGRAM(S): 50 INJECTION INTRAVENOUS at 20:13

## 2021-08-08 RX ADMIN — PIPERACILLIN AND TAZOBACTAM 25 GRAM(S): 4; .5 INJECTION, POWDER, LYOPHILIZED, FOR SOLUTION INTRAVENOUS at 13:42

## 2021-08-08 RX ADMIN — ATORVASTATIN CALCIUM 80 MILLIGRAM(S): 80 TABLET, FILM COATED ORAL at 22:23

## 2021-08-08 RX ADMIN — FENTANYL CITRATE 50 MICROGRAM(S): 50 INJECTION INTRAVENOUS at 20:30

## 2021-08-08 NOTE — PROGRESS NOTE ADULT - ASSESSMENT
76y Male with above PMH/PSH, recent CABG in 12/2020, presented to ED with 2 days of abdominal pain which has resolved now. No fever or leukocytosis. Workup so far is equivocal for cholecystitis. LFT is elevated suggesting possible choledocholithiasis, possible passed stone. RUQ US with GBW thickening, trace pericholecystic fluid, CBD normal. Overall impression, cholecystitis vs choledocholithiasis. HIDA showing cholecystitis.    Plan:  - OR today for laparoscopic cholecystectomy, possible open with Dr. Vasquez?      ACS/Trauma Surgery  [id number] 76y Male with above PMH/PSH, recent CABG in 12/2020, presented to ED with 2 days of abdominal pain which has resolved now. No fever or leukocytosis. Workup so far is equivocal for cholecystitis. LFT is elevated suggesting possible choledocholithiasis, possible passed stone. RUQ US with GBW thickening, trace pericholecystic fluid, CBD normal. Overall impression, cholecystitis vs choledocholithiasis. HIDA showing cholecystitis.    Plan:  - OR today for laparoscopic cholecystectomy, possible open with Dr. Vasquez?  - pain control   - OOB/ambulate as tolerated   - lovenox for DVT prophylaxis   - NPO for possible procedure   - IVF       ACS/Trauma Surgery  [id number]

## 2021-08-08 NOTE — PROGRESS NOTE ADULT - ASSESSMENT
ruq pain   inc lfts  acute jose vs cbd stone    plan  mrcp if neg  needs surgery  npo ivf  iv abs  surgery oin the case  medical clearance  seriaal labs  ppi once a day    Advanced care planning was discussed with patient and family.  Advanced care planning forms were reviewed and discussed.  Risks, benefits and alternatives of gastroenterologic procedures were discussed in detail and all questions were answered.    30 minutes spent.

## 2021-08-08 NOTE — PROGRESS NOTE ADULT - SUBJECTIVE AND OBJECTIVE BOX
INTERVAL HPI/OVERNIGHT EVENTS:  ruq pain improved acute jose on hida    Allergies    No Known Allergies    Intolerances          General:  No wt loss, fevers, chills, night sweats, fatigue,   Eyes:  Good vision, no reported pain  ENT:  No sore throat, pain, runny nose, dysphagia  CV:  No pain, palpitations, hypo/hypertension  Resp:  No dyspnea, cough, tachypnea, wheezing  GI:  No pain, No nausea, No vomiting, No diarrhea, No constipation, No weight loss, No fever, No pruritis, No rectal bleeding, No tarry stools, No dysphagia,  :  No pain, bleeding, incontinence, nocturia  Muscle:  No pain, weakness  Neuro:  No weakness, tingling, memory problems  Psych:  No fatigue, insomnia, mood problems, depression  Endocrine:  No polyuria, polydipsia, cold/heat intolerance  Heme:  No petechiae, ecchymosis, easy bruisability  Skin:  No rash, tattoos, scars, edema      PHYSICAL EXAM:   Vital Signs:  Vital Signs Last 24 Hrs  T(C): 36.7 (08 Aug 2021 13:38), Max: 36.7 (08 Aug 2021 00:49)  T(F): 98.1 (08 Aug 2021 13:38), Max: 98.1 (08 Aug 2021 13:38)  HR: 54 (08 Aug 2021 13:38) (54 - 65)  BP: 150/70 (08 Aug 2021 13:38) (105/65 - 150/89)  BP(mean): --  RR: 18 (08 Aug 2021 13:38) (18 - 18)  SpO2: 96% (08 Aug 2021 13:38) (96% - 98%)  Daily     Daily I&O's Summary    07 Aug 2021 07:01  -  08 Aug 2021 07:00  --------------------------------------------------------  IN: 690 mL / OUT: 750 mL / NET: -60 mL    08 Aug 2021 07:01  -  08 Aug 2021 13:39  --------------------------------------------------------  IN: 0 mL / OUT: 200 mL / NET: -200 mL        GENERAL:  Appears stated age, well-groomed, well-nourished, no distress  HEENT:  NC/AT,  conjunctivae clear and pink, no thyromegaly, nodules, adenopathy, no JVD, sclera -anicteric  CHEST:  Full & symmetric excursion, no increased effort, breath sounds clear  HEART:  Regular rhythm, S1, S2, no murmur/rub/S3/S4, no abdominal bruit, no edema  ABDOMEN:  Soft, non-tender, non-distended, normoactive bowel sounds,  no masses ,no hepato-splenomegaly, no signs of chronic liver disease  EXTEREMITIES:  no cyanosis,clubbing or edema  SKIN:  No rash/erythema/ecchymoses/petechiae/wounds/abscess/warm/dry  NEURO:  Alert, oriented, no asterixis, no tremor, no encephalopathy      LABS:                        12.5   3.50  )-----------( 139      ( 08 Aug 2021 07:11 )             38.3     08-08    141  |  106  |  19  ----------------------------<  116<H>  3.7   |  22  |  1.49<H>    Ca    9.4      08 Aug 2021 07:11    TPro  6.8  /  Alb  3.3  /  TBili  0.7  /  DBili  x   /  AST  134<H>  /  ALT  170<H>  /  AlkPhos  268<H>  08-08    PT/INR - ( 08 Aug 2021 07:11 )   PT: 11.2 sec;   INR: 0.93 ratio         PTT - ( 08 Aug 2021 07:11 )  PTT:32.4 sec    amylase   lipase  RADIOLOGY & ADDITIONAL TESTS:

## 2021-08-08 NOTE — PROGRESS NOTE ADULT - SUBJECTIVE AND OBJECTIVE BOX
Surgery Progress Note     Subjective/24hour Events:   Patient seen and examined. No acute events overnight. Pain controlled.     Vital Signs:  Vital Signs Last 24 Hrs  T(C): 36.4 (08 Aug 2021 04:57), Max: 36.9 (07 Aug 2021 06:42)  T(F): 97.6 (08 Aug 2021 04:57), Max: 98.4 (07 Aug 2021 06:42)  HR: 57 (08 Aug 2021 04:57) (55 - 65)  BP: 105/65 (08 Aug 2021 04:57) (105/65 - 155/78)  BP(mean): --  RR: 18 (08 Aug 2021 04:57) (16 - 18)  SpO2: 96% (08 Aug 2021 04:57) (96% - 98%)    CAPILLARY BLOOD GLUCOSE      POCT Blood Glucose.: 139 mg/dL (07 Aug 2021 21:35)  POCT Blood Glucose.: 95 mg/dL (07 Aug 2021 17:48)  POCT Blood Glucose.: 155 mg/dL (07 Aug 2021 13:34)  POCT Blood Glucose.: 95 mg/dL (07 Aug 2021 09:02)      I&O's Detail    06 Aug 2021 07:01  -  07 Aug 2021 07:00  --------------------------------------------------------  IN:    IV PiggyBack: 100 mL  Total IN: 100 mL    OUT:  Total OUT: 0 mL    Total NET: 100 mL      07 Aug 2021 07:01  -  08 Aug 2021 05:24  --------------------------------------------------------  IN:    IV PiggyBack: 100 mL    Oral Fluid: 240 mL  Total IN: 340 mL    OUT:    Voided (mL): 300 mL  Total OUT: 300 mL    Total NET: 40 mL          Physical Exam:  Gen: NAD, comfortably resting in bed  Lungs: Non labored breathing on room air  Ab:    Ext: Moves all 4 spontaneously, warm and well perfused    Labs:    08-07    140  |  106  |  17  ----------------------------<  111<H>  3.7   |  23  |  1.02    Ca    9.5      07 Aug 2021 07:18  Phos  2.9     08-06  Mg     1.8     08-06    TPro  7.1  /  Alb  3.5  /  TBili  1.4<H>  /  DBili  x   /  AST  169<H>  /  ALT  210<H>  /  AlkPhos  280<H>  08-07    LIVER FUNCTIONS - ( 07 Aug 2021 07:18 )  Alb: 3.5 g/dL / Pro: 7.1 g/dL / ALK PHOS: 280 U/L / ALT: 210 U/L / AST: 169 U/L / GGT: x                                 12.7   3.43  )-----------( 135      ( 07 Aug 2021 07:39 )             38.5

## 2021-08-08 NOTE — PROVIDER CONTACT NOTE (OTHER) - BACKGROUND
admitted 8/6 for epigastric pain, elevated LFTs. RUQ USD +cholelithiasis. 8/7 hyda scan +acute jose.

## 2021-08-08 NOTE — CHART NOTE - NSCHARTNOTEFT_GEN_A_CORE
POST-OPERATIVE NOTE    Procedure: Laparoscopic cholecystectomy    Subjective:  Patient is s/p laparoscopic cholecystectomy. Patient seen and examined at bedside. Recovering appropriately without postoperative complaints.    Vital Signs Last 24 Hrs  T(C): 36.8 (08 Aug 2021 22:15), Max: 36.8 (08 Aug 2021 22:15)  T(F): 98.2 (08 Aug 2021 22:15), Max: 98.2 (08 Aug 2021 22:15)  HR: 68 (08 Aug 2021 22:45) (53 - 68)  BP: 162/78 (08 Aug 2021 22:45) (105/65 - 202/90)  BP(mean): 110 (08 Aug 2021 22:45) (109 - 138)  RR: 15 (08 Aug 2021 22:45) (14 - 18)  SpO2: 99% (08 Aug 2021 22:45) (95% - 100%)    Physical Exam:  General: NAD, resting comfortably in bed  Pulmonary: Nonlabored breathing, no respiratory distress  Abdominal: soft, appropriate incisional tenderness, nondistended, dressings, clear, dry, intact    Assessment:  The patient is a 76y Male s/p laparoscopic cholecystectomy.    Plan:  - Pain control as needed  - DVT ppx  - OOB and ambulating as tolerated  - F/u AM labs  - Rest of care per primary team POST-OPERATIVE NOTE    Procedure: Laparoscopic cholecystectomy    Subjective:  Patient is s/p laparoscopic cholecystectomy. Patient seen and examined at bedside. Recovering appropriately without postoperative complaints.    Vital Signs Last 24 Hrs  T(C): 36.8 (08 Aug 2021 22:15), Max: 36.8 (08 Aug 2021 22:15)  T(F): 98.2 (08 Aug 2021 22:15), Max: 98.2 (08 Aug 2021 22:15)  HR: 68 (08 Aug 2021 22:45) (53 - 68)  BP: 162/78 (08 Aug 2021 22:45) (105/65 - 202/90)  BP(mean): 110 (08 Aug 2021 22:45) (109 - 138)  RR: 15 (08 Aug 2021 22:45) (14 - 18)  SpO2: 99% (08 Aug 2021 22:45) (95% - 100%)    Physical Exam:  General: NAD, resting comfortably in bed  Pulmonary: Nonlabored breathing, no respiratory distress  Abdominal: soft, appropriate incisional tenderness, nondistended, dressings, clear, dry, intact    Assessment:  The patient is a 76y Male s/p laparoscopic cholecystectomy.    Plan:  - Pain control as needed  - DVT ppx  - OOB and ambulating as tolerated  - F/u AM labs

## 2021-08-08 NOTE — PROGRESS NOTE ADULT - SUBJECTIVE AND OBJECTIVE BOX
Patient is a 76y old  Male who presents with a chief complaint of Epigastric Pain (08 Aug 2021 10:36)      SUBJECTIVE / OVERNIGHT EVENTS: No new complaints.   Review of Systems  chest pain no  palpitations no  sob no  nausea no  headache no    MEDICATIONS  (STANDING):  amLODIPine   Tablet 5 milliGRAM(s) Oral daily  atorvastatin 80 milliGRAM(s) Oral at bedtime  dextrose 40% Gel 15 Gram(s) Oral once  dextrose 5%. 1000 milliLiter(s) (50 mL/Hr) IV Continuous <Continuous>  dextrose 5%. 1000 milliLiter(s) (100 mL/Hr) IV Continuous <Continuous>  dextrose 50% Injectable 25 Gram(s) IV Push once  dextrose 50% Injectable 12.5 Gram(s) IV Push once  dextrose 50% Injectable 25 Gram(s) IV Push once  enoxaparin Injectable 40 milliGRAM(s) SubCutaneous daily  famotidine    Tablet 20 milliGRAM(s) Oral daily  glucagon  Injectable 1 milliGRAM(s) IntraMuscular once  insulin lispro (ADMELOG) corrective regimen sliding scale   SubCutaneous every 6 hours  metoprolol tartrate 50 milliGRAM(s) Oral every 8 hours  piperacillin/tazobactam IVPB.. 3.375 Gram(s) IV Intermittent every 8 hours  sodium chloride 0.9%. 1000 milliLiter(s) (50 mL/Hr) IV Continuous <Continuous>  tamsulosin 0.4 milliGRAM(s) Oral at bedtime    MEDICATIONS  (PRN):  acetaminophen   Tablet .. 650 milliGRAM(s) Oral every 6 hours PRN Temp greater or equal to 38C (100.4F), Mild Pain (1 - 3), Moderate Pain (4 - 6), Severe Pain (7 - 10)  aluminum hydroxide/magnesium hydroxide/simethicone Suspension 30 milliLiter(s) Oral every 4 hours PRN Dyspepsia  meclizine 25 milliGRAM(s) Oral every 8 hours PRN Dizziness      Vital Signs Last 24 Hrs  T(C): 36.6 (08 Aug 2021 09:55), Max: 36.7 (08 Aug 2021 00:49)  T(F): 97.8 (08 Aug 2021 09:55), Max: 98 (08 Aug 2021 00:49)  HR: 56 (08 Aug 2021 09:55) (55 - 65)  BP: 150/79 (08 Aug 2021 09:55) (105/65 - 150/89)  BP(mean): --  RR: 18 (08 Aug 2021 09:55) (18 - 18)  SpO2: 96% (08 Aug 2021 09:55) (96% - 98%)    PHYSICAL EXAM:  GENERAL: NAD, well-developed  HEAD:  Atraumatic, Normocephalic  EYES: EOMI, PERRLA, conjunctiva and sclera clear  NECK: Supple, No JVD  CHEST/LUNG: Clear to auscultation bilaterally; No wheeze  HEART: Regular rate and rhythm; No murmurs, rubs, or gallops  ABDOMEN: Soft, Nontender, Nondistended; Bowel sounds present RUQ tender  EXTREMITIES:  2+ Peripheral Pulses, No clubbing, cyanosis, or edema  PSYCH: AAOx3  NEUROLOGY: non-focal  SKIN: No rashes or lesions    LABS:                        12.5   3.50  )-----------( 139      ( 08 Aug 2021 07:11 )             38.3     08-08    141  |  106  |  19  ----------------------------<  116<H>  3.7   |  22  |  1.49<H>    Ca    9.4      08 Aug 2021 07:11  Phos  2.9     08-06  Mg     1.8     08-06    TPro  6.8  /  Alb  3.3  /  TBili  0.7  /  DBili  x   /  AST  134<H>  /  ALT  170<H>  /  AlkPhos  268<H>  08-08    PT/INR - ( 08 Aug 2021 07:11 )   PT: 11.2 sec;   INR: 0.93 ratio         PTT - ( 08 Aug 2021 07:11 )  PTT:32.4 sec            RADIOLOGY & ADDITIONAL TESTS:    Imaging Personally Reviewed:    Consultant(s) Notes Reviewed:      Care Discussed with Consultants/Other Providers:

## 2021-08-08 NOTE — BRIEF OPERATIVE NOTE - OPERATION/FINDINGS
Laparoscopic cholecystectomy for acute cholecystitis     Inflamed gallbladder at the fundus, the infundibulum appears relatively healthy without much inflammation; critical view achieved with mix of blunt dissection and electrocautery. Duct and artery taken with 5mm hemolocks x 3 and divided. The gallbladder taken off fossa without spillage. The liver bed hemostatic at the end of the case

## 2021-08-08 NOTE — PROGRESS NOTE ADULT - ASSESSMENT
77M with PMHx of HTN, T2DM, BPH, and CAD (post-CABG 12/2020) presenting with two day history of epigastric pain with radiation to the back. Labs significant for cholestatic transaminitis and on exam patient with RUQ pain to palpation. RUQ sono showing cholelithiasis, & wall thickening. Clinically there is concern for choledocholithiasis and or cholecystitis.    Acute cholecystitis  - Perform MRCP   - Cover with Zosyn  - cholecystectomy pending   - GI evaluation Dr. Quijano called    CAD (post-CABG)  - Continue with baby aspirin, statin and beta blocker  - Cardiology evaluation Dr. Hill    BPH  - Continue with Flomax    T2DM  - Hold home metformin  - FS TID AC & insulin sliding scale     HTN  - Continue to monitor, if elevated start ACEI    DVT prophylaxis    No acute medical condition identified to postpone planned surgical intervention.     Alessandro Maldonado MD pager [phone]

## 2021-08-08 NOTE — PROVIDER CONTACT NOTE (OTHER) - SITUATION
pts bp 168/88. pt being picked up by transport to go to OR for lap jose
pts HR 54 and due for metoprolol.

## 2021-08-08 NOTE — PROVIDER CONTACT NOTE (OTHER) - ASSESSMENT
pt asymptomatic. All other VSS. No c/c dizziness or lightheadedness.
pt nervous about OR procedure. otherwise asymptomatic. all other VSS.

## 2021-08-08 NOTE — CONSULT NOTE ADULT - SUBJECTIVE AND OBJECTIVE BOX
Chief Complaint:  Patient is a 76y old  Male who presents with a chief complaint of Epigastric Pain 77M with PMHx of HTN, T2DM, BPH, and CAD (post-CABG 12/2020) presenting with two day history of epigastric pain with radiation to the back. Patient states pain occurred suddenly and was severe and constant and described as sharp. He denies any aggravating or alleviating factors. He endorses loss of appetite and feeling nauseous at times. He states his urine has been darker, but stool color and consistency unchanged. He is unsure if he more tan than usual, but does appear slightly more jaundice. Patient denies fever or chills. In the ED he had labs showing cholestatic transaminitis and RUQ sono showing cholelithiasis, & wall thickening. Patient given Zosyn and Pepcid. Patient seen by general surgery. Patient with no complaints currently, states pain is improved (despite no intervention).     Review of Systems:  Review of Systems: GEN: no night sweats; +loss of appetite   EYES: no changes in vision or diplopia   ENT: no epistaxis, sinus pain, gingival bleeding, odynophagia or dysphagia  CV: no CP, PND or palpitations  RESP: no cough, wheezing, or hemoptysis  GI: no hematemesis, hematochezia, or melena; +n/v  : no dysuria, polyuria, or hematuria  MSK: no arthralgias or joint swelling   NEURO: no gross sensory changes, numbness, focal deficits  PSYCH: no depression or changes in concentration  HEME/ONC: no purpura, petechiae or night sweats  SKIN: no pruritus, hair loss or skin lesions  ALL: no photosensitivity, no complaints of anaphylaxis (SOB, throat swelling)      Allergies:  No Known Allergies      Medications:  acetaminophen   Tablet .. 650 milliGRAM(s) Oral every 6 hours PRN  aluminum hydroxide/magnesium hydroxide/simethicone Suspension 30 milliLiter(s) Oral every 4 hours PRN  amLODIPine   Tablet 5 milliGRAM(s) Oral daily  aspirin enteric coated 81 milliGRAM(s) Oral daily  atorvastatin 80 milliGRAM(s) Oral at bedtime  dextrose 40% Gel 15 Gram(s) Oral once  dextrose 5%. 1000 milliLiter(s) IV Continuous <Continuous>  dextrose 5%. 1000 milliLiter(s) IV Continuous <Continuous>  dextrose 50% Injectable 25 Gram(s) IV Push once  dextrose 50% Injectable 12.5 Gram(s) IV Push once  dextrose 50% Injectable 25 Gram(s) IV Push once  enoxaparin Injectable 40 milliGRAM(s) SubCutaneous daily  famotidine    Tablet 20 milliGRAM(s) Oral daily  glucagon  Injectable 1 milliGRAM(s) IntraMuscular once  insulin lispro (ADMELOG) corrective regimen sliding scale   SubCutaneous three times a day before meals  insulin lispro (ADMELOG) corrective regimen sliding scale   SubCutaneous at bedtime  meclizine 25 milliGRAM(s) Oral every 8 hours PRN  metoprolol tartrate 50 milliGRAM(s) Oral every 8 hours  piperacillin/tazobactam IVPB.. 3.375 Gram(s) IV Intermittent every 8 hours  tamsulosin 0.4 milliGRAM(s) Oral at bedtime      PMHX/PSHX:  HTN (hypertension)    HLD (hyperlipidemia)    DM (diabetes mellitus)    OA (osteoarthritis)    BPH (benign prostatic hyperplasia)    CAD in native artery    HTN (hypertension)    HLD (hyperlipidemia)    Type 2 diabetes mellitus    Osteoarthritis    BPH (benign prostatic hyperplasia)    Aneurysm of right leg    S/P appendectomy    S/P left inguinal hernia repair    S/P excision of ganglion cyst    No significant past surgical history    H/O left inguinal hernia repair    S/P appendectomy        Family history:  No pertinent family history in first degree relatives    No pertinent family history in first degree relatives    FH: hypertension        Social History:   no ivda no prbc  lives at home no etoh    ROS:     General:  No wt loss, fevers, chills, night sweats, fatigue,   Eyes:  Good vision, no reported pain  ENT:  No sore throat, pain, runny nose, dysphagia  CV:  No pain, palpitations, hypo/hypertension  Resp:  No dyspnea, cough, tachypnea, wheezing  GI:  No pain, No nausea, No vomiting, No diarrhea, No constipation, No weight loss, No fever, No pruritis, No rectal bleeding, No tarry stools, No dysphagia,  :  No pain, bleeding, incontinence, nocturia  Muscle:  No pain, weakness  Neuro:  No weakness, tingling, memory problems  Psych:  No fatigue, insomnia, mood problems, depression  Endocrine:  No polyuria, polydipsia, cold/heat intolerance  Heme:  No petechiae, ecchymosis, easy bruisability  Skin:  No rash, tattoos, scars, edema      PHYSICAL EXAM:   Vital Signs:  Vital Signs Last 24 Hrs  T(C): 36.4 (07 Aug 2021 09:37), Max: 36.9 (07 Aug 2021 06:42)  T(F): 97.5 (07 Aug 2021 09:37), Max: 98.4 (07 Aug 2021 06:42)  HR: 55 (07 Aug 2021 09:37) (55 - 74)  BP: 144/76 (07 Aug 2021 09:37) (135/71 - 167/96)  BP(mean): --  RR: 18 (07 Aug 2021 09:37) (16 - 18)  SpO2: 96% (07 Aug 2021 09:37) (96% - 100%)  Daily     Daily     GENERAL:  Appears stated age, well-groomed, well-nourished, no distress  HEENT:  NC/AT,  conjunctivae clear and pink, no thyromegaly, nodules, adenopathy, no JVD, sclera -anicteric  CHEST:  Full & symmetric excursion, no increased effort, breath sounds clear  HEART:  Regular rhythm, S1, S2, no murmur/rub/S3/S4, no abdominal bruit, no edema  ABDOMEN:  Soft, non-tender, non-distended, normoactive bowel sounds,  no masses ,no hepato-splenomegaly, no signs of chronic liver disease  EXTEREMITIES:  no cyanosis,clubbing or edema  SKIN:  No rash/erythema/ecchymoses/petechiae/wounds/abscess/warm/dry  NEURO:  Alert, oriented, no asterixis, no tremor, no encephalopathy    LABS:                        12.7   3.43  )-----------( 135      ( 07 Aug 2021 07:39 )             38.5     08-07    140  |  106  |  17  ----------------------------<  111<H>  3.7   |  23  |  1.02    Ca    9.5      07 Aug 2021 07:18  Phos  2.9     08-06  Mg     1.8     08-06    TPro  7.1  /  Alb  3.5  /  TBili  1.4<H>  /  DBili  x   /  AST  169<H>  /  ALT  210<H>  /  AlkPhos  280<H>  08-07    LIVER FUNCTIONS - ( 07 Aug 2021 07:18 )  Alb: 3.5 g/dL / Pro: 7.1 g/dL / ALK PHOS: 280 U/L / ALT: 210 U/L / AST: 169 U/L / GGT: x                   Imaging:          
CHIEF COMPLAINT: abd pain    HISTORY OF PRESENT ILLNESS:  77M with PMHx of HTN, T2DM, BPH, and CAD (post-CABG 12/2020) presenting with two day history of epigastric pain with radiation to the back. Patient states pain occurred suddenly and was severe and constant and described as sharp. He denies any aggravating or alleviating factors. He endorses loss of appetite and feeling nauseous at times. He states his urine has been darker, but stool color and consistency unchanged. He is unsure if he more tan than usual, but does appear slightly more jaundice. Patient denies fever or chills. In the ED he had labs showing cholestatic transaminitis and RUQ sono showing cholelithiasis, & wall thickening. Patient given Zosyn and Pepcid. Patient seen by general surgery. Patient with no complaints currently, states pain is improved (despite no intervention).        Allergies    No Known Allergies    Intolerances    	    MEDICATIONS:  amLODIPine   Tablet 5 milliGRAM(s) Oral daily  enoxaparin Injectable 40 milliGRAM(s) SubCutaneous daily  metoprolol tartrate 50 milliGRAM(s) Oral every 8 hours  tamsulosin 0.4 milliGRAM(s) Oral at bedtime  piperacillin/tazobactam IVPB.. 3.375 Gram(s) IV Intermittent every 8 hours  acetaminophen   Tablet .. 650 milliGRAM(s) Oral every 6 hours PRN  meclizine 25 milliGRAM(s) Oral every 8 hours PRN  aluminum hydroxide/magnesium hydroxide/simethicone Suspension 30 milliLiter(s) Oral every 4 hours PRN  famotidine    Tablet 20 milliGRAM(s) Oral daily  atorvastatin 80 milliGRAM(s) Oral at bedtime  dextrose 40% Gel 15 Gram(s) Oral once  dextrose 50% Injectable 25 Gram(s) IV Push once  dextrose 50% Injectable 12.5 Gram(s) IV Push once  dextrose 50% Injectable 25 Gram(s) IV Push once  glucagon  Injectable 1 milliGRAM(s) IntraMuscular once  insulin lispro (ADMELOG) corrective regimen sliding scale   SubCutaneous every 6 hours    dextrose 5%. 1000 milliLiter(s) IV Continuous <Continuous>  dextrose 5%. 1000 milliLiter(s) IV Continuous <Continuous>  sodium chloride 0.9%. 1000 milliLiter(s) IV Continuous <Continuous>      PAST MEDICAL & SURGICAL HISTORY:  HLD (hyperlipidemia)    OA (osteoarthritis)    CAD in native artery    HTN (hypertension)    Type 2 diabetes mellitus    Osteoarthritis    BPH (benign prostatic hyperplasia)    Aneurysm of right leg    S/P appendectomy    S/P excision of ganglion cyst    H/O left inguinal hernia repair        FAMILY HISTORY:  FH: hypertension        SOCIAL HISTORY:    non smoker. indep in adl      REVIEW OF SYSTEMS:  See HPI, otherwise complete 10 point review of systems negative    [ ] All others negative	      PHYSICAL EXAM:  T(C): 36.6 (08-08-21 @ 09:55), Max: 36.7 (08-08-21 @ 00:49)  HR: 56 (08-08-21 @ 09:55) (55 - 65)  BP: 150/79 (08-08-21 @ 09:55) (105/65 - 150/89)  RR: 18 (08-08-21 @ 09:55) (18 - 18)  SpO2: 96% (08-08-21 @ 09:55) (96% - 98%)  Wt(kg): --  I&O's Summary    07 Aug 2021 07:01  -  08 Aug 2021 07:00  --------------------------------------------------------  IN: 690 mL / OUT: 750 mL / NET: -60 mL    08 Aug 2021 07:01  -  08 Aug 2021 10:37  --------------------------------------------------------  IN: 0 mL / OUT: 200 mL / NET: -200 mL        Appearance: No Acute Distress	  HEENT:  Normal oral mucosa, PERRL, EOMI	  Cardiovascular: Normal S1 S2, No JVD, No murmurs/rubs/gallops  Respiratory: Lungs clear to auscultation bilaterally  Gastrointestinal:  Soft, Non-tender, + BS	  Skin: No rashes, No ecchymoses, No cyanosis	  Neurologic: Non-focal  Extremities: No clubbing, cyanosis or edema  Vascular: Peripheral pulses palpable 2+ bilaterally  Psychiatry: A & O x 3, Mood & affect appropriate    Laboratory Data:	 	    CBC Full  -  ( 08 Aug 2021 07:11 )  WBC Count : 3.50 K/uL  Hemoglobin : 12.5 g/dL  Hematocrit : 38.3 %  Platelet Count - Automated : 139 K/uL  Mean Cell Volume : 89.9 fl  Mean Cell Hemoglobin : 29.3 pg  Mean Cell Hemoglobin Concentration : 32.6 gm/dL  Auto Neutrophil # : x  Auto Lymphocyte # : x  Auto Monocyte # : x  Auto Eosinophil # : x  Auto Basophil # : x  Auto Neutrophil % : x  Auto Lymphocyte % : x  Auto Monocyte % : x  Auto Eosinophil % : x  Auto Basophil % : x    08-08    141  |  106  |  19  ----------------------------<  116<H>  3.7   |  22  |  1.49<H>  08-07    140  |  106  |  17  ----------------------------<  111<H>  3.7   |  23  |  1.02    Ca    9.4      08 Aug 2021 07:11  Ca    9.5      07 Aug 2021 07:18  Phos  2.9     08-06  Mg     1.8     08-06    TPro  6.8  /  Alb  3.3  /  TBili  0.7  /  DBili  x   /  AST  134<H>  /  ALT  170<H>  /  AlkPhos  268<H>  08-08  TPro  7.1  /  Alb  3.5  /  TBili  1.4<H>  /  DBili  x   /  AST  169<H>  /  ALT  210<H>  /  AlkPhos  280<H>  08-07      proBNP:   Lipid Profile:   HgA1c:   TSH:       CARDIAC MARKERS:            Interpretation of Telemetry: 	    ECG:  nsr inf q waves	  RADIOLOGY:  OTHER: 	    PREVIOUS DIAGNOSTIC TESTING:    [ ] Echocardiogram:  [ ] Catheterization:  [ ] Stress Test:  	    Assessment:  -cardiac clearance  -acute cholecystitis  -CAD s/p 3V CABG 12/2020  -DM  -HTN    Recs:  cardiac status appears stable  denies any ischemic or hf sx. prior 2d echo with normal lvef, no significant vavular pathology. ecg nsr inf q waves no acute st/t changes. patient medically optimized and can proceed to surgery without further cardiac workup  resume asa if not otherwise contraindicated  resume statin once lfts normalize  cw beta blockers perioperatively  monitor renal fxn and vol status  care per surgery        Greater than 60 minutes spent on total encounter; more than 50% of the visit was spent counseling and/or coordinating care by the attending physician.   	  Singh Hill MD   Cardiovascular Diseases  [phone]    
KENDALL DAWSON [id number]    HPI:  76y Male who presented to ED with 2 days of abdominal pain, mainly epigastric, no nausea or vomiting. Pain has resolved in the ED. Workup in ED equivocal for cholecystitis, LFT raised concerning for possible choledocholithiasis. Patient had CABG in december 2020.    PAST MEDICAL & SURGICAL HISTORY:  HTN (hypertension)  HLD (hyperlipidemia)  DM (diabetes mellitus)  OA (osteoarthritis)  BPH (benign prostatic hyperplasia)  CAD in native artery  HTN (hypertension)  HLD (hyperlipidemia)  Type 2 diabetes mellitus  Osteoarthritis  BPH (benign prostatic hyperplasia)  Aneurysm of right leg  S/P appendectomy  S/P left inguinal hernia repair  S/P excision of ganglion cyst  H/O left inguinal hernia repair  S/P appendectomy    MEDICATIONS  (PRN):  aluminum hydroxide/magnesium hydroxide/simethicone Suspension 30 milliLiter(s) Oral every 4 hours PRN Dyspepsia    Allergies  No Known Allergies  Intolerances    REVIEW OF SYSTEMS  [ x ] A ten-point review of systems was otherwise negative except as noted.  [ ] Due to altered mental status/intubation, subjective information were not able to be obtained from the patient. History was obtained, to the extent possible, from review of the chart and collateral sources of information.    Vital Signs Last 24 Hrs  T(C): 36.6 (06 Aug 2021 15:37), Max: 36.7 (06 Aug 2021 11:31)  T(F): 97.9 (06 Aug 2021 15:37), Max: 98.1 (06 Aug 2021 11:31)  HR: 62 (06 Aug 2021 15:37) (62 - 80)  BP: 153/72 (06 Aug 2021 15:37) (142/89 - 153/72)  RR: 18 (06 Aug 2021 14:48) (16 - 18)  SpO2: 100% (06 Aug 2021 15:37) (95% - 100%)    PHYSICAL EXAM:  GENERAL: NAD, well-appearing  CHEST/LUNG: Clear to auscultation bilaterally  HEART: Regular rate and rhythm  ABDOMEN: Soft, mild epigastric and RUQ tenderness, Nondistended;   EXTREMITIES:  No clubbing, cyanosis, or edema    LABS:  CAPILLARY BLOOD GLUCOSE                        12.3   5.19  )-----------( 141      ( 06 Aug 2021 15:14 )             38.4         08-06    141  |  106  |  15  ----------------------------<  112<H>  3.8   |  23  |  0.95      Calcium, Total Serum: 10.0 mg/dL (08-06-21 @ 13:27)      LFTs:             7.7  | 2.8  | 262      ------------------[340     ( 06 Aug 2021 13:27 )  3.9  | 0.9  | 286         Lipase:x      Amylase:x         Blood Gas Venous - Lactate: 2.6 mmoL/L (08-06-21 @ 13:26)    RADIOLOGY & ADDITIONAL STUDIES:  < from: US Abdomen Upper Quadrant Right (08.06.21 @ 15:06) >  IMPRESSION:    Gallstones. Wall thickening and trace pericholecystic fluid. Negative sonographic Canales's sign. Findings are equivocal for cholecystitis. Nuclear medicine HIDA scan can be performed further evaluation.    < end of copied text >

## 2021-08-08 NOTE — CHART NOTE - NSCHARTNOTEFT_GEN_A_CORE
Pre-operative Note    Preop Dx: Cholecystitis   Surgeon: Dr. Beavers  Procedure: Lap jose/ possible open     Vital Signs Last 24 Hrs  T(C): 36.6 (08 Aug 2021 09:55), Max: 36.7 (08 Aug 2021 00:49)  T(F): 97.8 (08 Aug 2021 09:55), Max: 98 (08 Aug 2021 00:49)  HR: 56 (08 Aug 2021 09:55) (55 - 65)  BP: 150/79 (08 Aug 2021 09:55) (105/65 - 150/89)  BP(mean): --  RR: 18 (08 Aug 2021 09:55) (18 - 18)  SpO2: 96% (08 Aug 2021 09:55) (96% - 98%)                        12.5   3.50  )-----------( 139      ( 08 Aug 2021 07:11 )             38.3     08-08    141  |  106  |  19  ----------------------------<  116<H>  3.7   |  22  |  1.49<H>    Ca    9.4      08 Aug 2021 07:11  Phos  2.9     08-06  Mg     1.8     08-06    TPro  6.8  /  Alb  3.3  /  TBili  0.7  /  DBili  x   /  AST  134<H>  /  ALT  170<H>  /  AlkPhos  268<H>  08-08    PT/INR - ( 08 Aug 2021 07:11 )   PT: 11.2 sec;   INR: 0.93 ratio         PTT - ( 08 Aug 2021 07:11 )  PTT:32.4 sec  Daily     Daily     EKG:  CXR:   Type and Screen: O+        A/P: 76y Male     - OR 08/08/2021 for lap jose/possible open with Dr. Beavers   - NPO past midnight, except medications  - IVF while NPO  - Medical clearance for OR

## 2021-08-09 ENCOUNTER — TRANSCRIPTION ENCOUNTER (OUTPATIENT)
Age: 76
End: 2021-08-09

## 2021-08-09 VITALS
DIASTOLIC BLOOD PRESSURE: 89 MMHG | SYSTOLIC BLOOD PRESSURE: 167 MMHG | OXYGEN SATURATION: 97 % | HEART RATE: 70 BPM | RESPIRATION RATE: 18 BRPM | TEMPERATURE: 98 F

## 2021-08-09 LAB
ALBUMIN SERPL ELPH-MCNC: 3.4 G/DL — SIGNIFICANT CHANGE UP (ref 3.3–5)
ALP SERPL-CCNC: 224 U/L — HIGH (ref 40–120)
ALT FLD-CCNC: 149 U/L — HIGH (ref 10–45)
ANION GAP SERPL CALC-SCNC: 12 MMOL/L — SIGNIFICANT CHANGE UP (ref 5–17)
AST SERPL-CCNC: 123 U/L — HIGH (ref 10–40)
BILIRUB SERPL-MCNC: 0.7 MG/DL — SIGNIFICANT CHANGE UP (ref 0.2–1.2)
BUN SERPL-MCNC: 13 MG/DL — SIGNIFICANT CHANGE UP (ref 7–23)
CALCIUM SERPL-MCNC: 8.9 MG/DL — SIGNIFICANT CHANGE UP (ref 8.4–10.5)
CHLORIDE SERPL-SCNC: 105 MMOL/L — SIGNIFICANT CHANGE UP (ref 96–108)
CO2 SERPL-SCNC: 24 MMOL/L — SIGNIFICANT CHANGE UP (ref 22–31)
CREAT SERPL-MCNC: 1.01 MG/DL — SIGNIFICANT CHANGE UP (ref 0.5–1.3)
GLUCOSE BLDC GLUCOMTR-MCNC: 172 MG/DL — HIGH (ref 70–99)
GLUCOSE BLDC GLUCOMTR-MCNC: 182 MG/DL — HIGH (ref 70–99)
GLUCOSE BLDC GLUCOMTR-MCNC: 99 MG/DL — SIGNIFICANT CHANGE UP (ref 70–99)
GLUCOSE SERPL-MCNC: 195 MG/DL — HIGH (ref 70–99)
HCT VFR BLD CALC: 37 % — LOW (ref 39–50)
HGB BLD-MCNC: 12.1 G/DL — LOW (ref 13–17)
MAGNESIUM SERPL-MCNC: 2 MG/DL — SIGNIFICANT CHANGE UP (ref 1.6–2.6)
MCHC RBC-ENTMCNC: 29.7 PG — SIGNIFICANT CHANGE UP (ref 27–34)
MCHC RBC-ENTMCNC: 32.7 GM/DL — SIGNIFICANT CHANGE UP (ref 32–36)
MCV RBC AUTO: 90.9 FL — SIGNIFICANT CHANGE UP (ref 80–100)
NRBC # BLD: 0 /100 WBCS — SIGNIFICANT CHANGE UP (ref 0–0)
PHOSPHATE SERPL-MCNC: 2.9 MG/DL — SIGNIFICANT CHANGE UP (ref 2.5–4.5)
PLATELET # BLD AUTO: 149 K/UL — LOW (ref 150–400)
POTASSIUM SERPL-MCNC: 3.7 MMOL/L — SIGNIFICANT CHANGE UP (ref 3.5–5.3)
POTASSIUM SERPL-SCNC: 3.7 MMOL/L — SIGNIFICANT CHANGE UP (ref 3.5–5.3)
PROT SERPL-MCNC: 6.6 G/DL — SIGNIFICANT CHANGE UP (ref 6–8.3)
RBC # BLD: 4.07 M/UL — LOW (ref 4.2–5.8)
RBC # FLD: 13.4 % — SIGNIFICANT CHANGE UP (ref 10.3–14.5)
SODIUM SERPL-SCNC: 141 MMOL/L — SIGNIFICANT CHANGE UP (ref 135–145)
WBC # BLD: 5.13 K/UL — SIGNIFICANT CHANGE UP (ref 3.8–10.5)
WBC # FLD AUTO: 5.13 K/UL — SIGNIFICANT CHANGE UP (ref 3.8–10.5)

## 2021-08-09 PROCEDURE — 83735 ASSAY OF MAGNESIUM: CPT

## 2021-08-09 PROCEDURE — 80053 COMPREHEN METABOLIC PANEL: CPT

## 2021-08-09 PROCEDURE — C9399: CPT

## 2021-08-09 PROCEDURE — 82248 BILIRUBIN DIRECT: CPT

## 2021-08-09 PROCEDURE — 86900 BLOOD TYPING SEROLOGIC ABO: CPT

## 2021-08-09 PROCEDURE — U0005: CPT

## 2021-08-09 PROCEDURE — 82803 BLOOD GASES ANY COMBINATION: CPT

## 2021-08-09 PROCEDURE — 82435 ASSAY OF BLOOD CHLORIDE: CPT

## 2021-08-09 PROCEDURE — 86901 BLOOD TYPING SEROLOGIC RH(D): CPT

## 2021-08-09 PROCEDURE — 96374 THER/PROPH/DIAG INJ IV PUSH: CPT

## 2021-08-09 PROCEDURE — 71045 X-RAY EXAM CHEST 1 VIEW: CPT

## 2021-08-09 PROCEDURE — 85610 PROTHROMBIN TIME: CPT

## 2021-08-09 PROCEDURE — 99285 EMERGENCY DEPT VISIT HI MDM: CPT | Mod: 25

## 2021-08-09 PROCEDURE — 96375 TX/PRO/DX INJ NEW DRUG ADDON: CPT

## 2021-08-09 PROCEDURE — C1889: CPT

## 2021-08-09 PROCEDURE — U0003: CPT

## 2021-08-09 PROCEDURE — 85018 HEMOGLOBIN: CPT

## 2021-08-09 PROCEDURE — 82962 GLUCOSE BLOOD TEST: CPT

## 2021-08-09 PROCEDURE — 84100 ASSAY OF PHOSPHORUS: CPT

## 2021-08-09 PROCEDURE — 84484 ASSAY OF TROPONIN QUANT: CPT

## 2021-08-09 PROCEDURE — 84132 ASSAY OF SERUM POTASSIUM: CPT

## 2021-08-09 PROCEDURE — 85027 COMPLETE CBC AUTOMATED: CPT

## 2021-08-09 PROCEDURE — 83036 HEMOGLOBIN GLYCOSYLATED A1C: CPT

## 2021-08-09 PROCEDURE — 85730 THROMBOPLASTIN TIME PARTIAL: CPT

## 2021-08-09 PROCEDURE — 78227 HEPATOBIL SYST IMAGE W/DRUG: CPT

## 2021-08-09 PROCEDURE — A9537: CPT

## 2021-08-09 PROCEDURE — 83605 ASSAY OF LACTIC ACID: CPT

## 2021-08-09 PROCEDURE — 85014 HEMATOCRIT: CPT

## 2021-08-09 PROCEDURE — 84295 ASSAY OF SERUM SODIUM: CPT

## 2021-08-09 PROCEDURE — 82947 ASSAY GLUCOSE BLOOD QUANT: CPT

## 2021-08-09 PROCEDURE — 82330 ASSAY OF CALCIUM: CPT

## 2021-08-09 PROCEDURE — 86850 RBC ANTIBODY SCREEN: CPT

## 2021-08-09 PROCEDURE — 80074 ACUTE HEPATITIS PANEL: CPT

## 2021-08-09 PROCEDURE — 76705 ECHO EXAM OF ABDOMEN: CPT

## 2021-08-09 RX ORDER — TAMSULOSIN HYDROCHLORIDE 0.4 MG/1
1 CAPSULE ORAL
Qty: 30 | Refills: 0
Start: 2021-08-09 | End: 2021-09-07

## 2021-08-09 RX ORDER — POTASSIUM CHLORIDE 20 MEQ
1 PACKET (EA) ORAL
Qty: 0 | Refills: 0 | DISCHARGE

## 2021-08-09 RX ORDER — FUROSEMIDE 40 MG
1 TABLET ORAL
Qty: 0 | Refills: 0 | DISCHARGE

## 2021-08-09 RX ORDER — CHOLECALCIFEROL (VITAMIN D3) 125 MCG
0 CAPSULE ORAL
Qty: 0 | Refills: 0 | DISCHARGE

## 2021-08-09 RX ORDER — ASCORBIC ACID 60 MG
0 TABLET,CHEWABLE ORAL
Qty: 0 | Refills: 0 | DISCHARGE

## 2021-08-09 RX ADMIN — Medication 1: at 17:55

## 2021-08-09 RX ADMIN — FAMOTIDINE 20 MILLIGRAM(S): 10 INJECTION INTRAVENOUS at 12:17

## 2021-08-09 RX ADMIN — Medication 50 MILLIGRAM(S): at 06:12

## 2021-08-09 RX ADMIN — Medication 81 MILLIGRAM(S): at 12:16

## 2021-08-09 RX ADMIN — AMLODIPINE BESYLATE 5 MILLIGRAM(S): 2.5 TABLET ORAL at 06:12

## 2021-08-09 RX ADMIN — ENOXAPARIN SODIUM 40 MILLIGRAM(S): 100 INJECTION SUBCUTANEOUS at 12:16

## 2021-08-09 RX ADMIN — Medication 50 MILLIGRAM(S): at 13:09

## 2021-08-09 RX ADMIN — Medication 1: at 13:09

## 2021-08-09 NOTE — DISCHARGE NOTE PROVIDER - NSDCCPCAREPLAN_GEN_ALL_CORE_FT
PRINCIPAL DISCHARGE DIAGNOSIS  Diagnosis: Cholecystitis  Assessment and Plan of Treatment: Acute cholecystitis - Performed MRCP and + course of abx. with Zosyn  Seen and followed by surgery and GI - cholecystectomy on 8/8 wit no post op issues  - pt. OOB and ambulating, diet tolerated and cleared by surgery team  Followup with Dr. Beavers      SECONDARY DISCHARGE DIAGNOSES  Diagnosis: CAD (coronary artery disease)  Assessment and Plan of Treatment:      PRINCIPAL DISCHARGE DIAGNOSIS  Diagnosis: Epigastric pain  Assessment and Plan of Treatment: S/p israel jose on 8/8  Recovering appropriately postop  - Advance diet as tolerated per primary team  - Outer dressing can come off tomorrow, please leave steri strips on  - Pt may follow up with Dr. Beavers, Acute Care Surgery team within 2 weeks after discharge  - Surgery followed for post op instructions as above and as per sx cleared for d/c home.      SECONDARY DISCHARGE DIAGNOSES  Diagnosis: CAD (coronary artery disease)  Assessment and Plan of Treatment: c/w meds

## 2021-08-09 NOTE — PROGRESS NOTE ADULT - SUBJECTIVE AND OBJECTIVE BOX
Surgery Progress Note     Subjective/24hour Events:     Vital Signs:  Vital Signs Last 24 Hrs  T(C): 36.8 (09 Aug 2021 02:30), Max: 36.8 (08 Aug 2021 22:15)  T(F): 98.3 (09 Aug 2021 02:30), Max: 98.3 (09 Aug 2021 01:30)  HR: 64 (09 Aug 2021 02:30) (53 - 70)  BP: 137/70 (09 Aug 2021 02:30) (105/65 - 202/90)  BP(mean): 115 (08 Aug 2021 23:15) (109 - 138)  RR: 18 (09 Aug 2021 02:30) (14 - 18)  SpO2: 96% (09 Aug 2021 02:30) (95% - 100%)      I&O's Detail    08 Aug 2021 07:01  -  09 Aug 2021 02:33  --------------------------------------------------------  IN:    IV PiggyBack: 100 mL    Oral Fluid: 195 mL    sodium chloride 0.9%: 500 mL  Total IN: 795 mL    OUT:    Voided (mL): 750 mL  Total OUT: 750 mL    Total NET: 45 mL    Physical Exam:  General:   Lungs:   CV:   Abdomen:  Extremities:      Surgery Progress Note     Subjective/24hour Events:   - seen and examined in AM  - pain controlled w/ meds    Vital Signs:  Vital Signs Last 24 Hrs  T(C): 36.8 (09 Aug 2021 02:30), Max: 36.8 (08 Aug 2021 22:15)  T(F): 98.3 (09 Aug 2021 02:30), Max: 98.3 (09 Aug 2021 01:30)  HR: 64 (09 Aug 2021 02:30) (53 - 70)  BP: 137/70 (09 Aug 2021 02:30) (105/65 - 202/90)  BP(mean): 115 (08 Aug 2021 23:15) (109 - 138)  RR: 18 (09 Aug 2021 02:30) (14 - 18)  SpO2: 96% (09 Aug 2021 02:30) (95% - 100%)      I&O's Detail    08 Aug 2021 07:01  -  09 Aug 2021 02:33  --------------------------------------------------------  IN:    IV PiggyBack: 100 mL    Oral Fluid: 195 mL    sodium chloride 0.9%: 500 mL  Total IN: 795 mL    OUT:    Voided (mL): 750 mL  Total OUT: 750 mL    Total NET: 45 mL    Physical Exam:  General: NAD  Lungs: nonlabored  CV: RRR  Abdomen: soft, ND, appropriate incisional tenderness  Extremities: warm

## 2021-08-09 NOTE — PROGRESS NOTE ADULT - PROVIDER SPECIALTY LIST ADULT
Internal Medicine
Trauma Surgery
Cardiology
Gastroenterology
Surgery
Gastroenterology
Internal Medicine
Internal Medicine

## 2021-08-09 NOTE — DISCHARGE NOTE NURSING/CASE MANAGEMENT/SOCIAL WORK - PATIENT PORTAL LINK FT
You can access the FollowMyHealth Patient Portal offered by [facility] by registering at the following website: [email or web address]. By joining [facility]’s FollowMyHealth portal, you will also be able to view your health information using other applications (apps) compatible with our system.

## 2021-08-09 NOTE — PROGRESS NOTE ADULT - REASON FOR ADMISSION
Epigastric Pain

## 2021-08-09 NOTE — PROGRESS NOTE ADULT - SUBJECTIVE AND OBJECTIVE BOX
Patient is a 76y old  Male who presents with a chief complaint of Epigastric Pain (09 Aug 2021 15:35)      SUBJECTIVE / OVERNIGHT EVENTS: Comfortable without new complaints.   Review of Systems  chest pain no  palpitations no  sob no  nausea no  headache no    MEDICATIONS  (STANDING):  amLODIPine   Tablet 5 milliGRAM(s) Oral daily  aspirin enteric coated 81 milliGRAM(s) Oral daily  atorvastatin 80 milliGRAM(s) Oral at bedtime  dextrose 40% Gel 15 Gram(s) Oral once  dextrose 5%. 1000 milliLiter(s) (100 mL/Hr) IV Continuous <Continuous>  dextrose 5%. 1000 milliLiter(s) (50 mL/Hr) IV Continuous <Continuous>  dextrose 50% Injectable 25 Gram(s) IV Push once  dextrose 50% Injectable 12.5 Gram(s) IV Push once  dextrose 50% Injectable 25 Gram(s) IV Push once  enoxaparin Injectable 40 milliGRAM(s) SubCutaneous every 24 hours  famotidine    Tablet 20 milliGRAM(s) Oral daily  glucagon  Injectable 1 milliGRAM(s) IntraMuscular once  insulin lispro (ADMELOG) corrective regimen sliding scale   SubCutaneous three times a day before meals  insulin lispro (ADMELOG) corrective regimen sliding scale   SubCutaneous at bedtime  metoprolol tartrate 50 milliGRAM(s) Oral every 8 hours  tamsulosin 0.4 milliGRAM(s) Oral at bedtime    MEDICATIONS  (PRN):  aluminum hydroxide/magnesium hydroxide/simethicone Suspension 30 milliLiter(s) Oral every 4 hours PRN Dyspepsia  meclizine 25 milliGRAM(s) Oral every 8 hours PRN Dizziness      Vital Signs Last 24 Hrs  T(C): 36.9 (09 Aug 2021 16:42), Max: 37 (09 Aug 2021 12:57)  T(F): 98.4 (09 Aug 2021 16:42), Max: 98.6 (09 Aug 2021 12:57)  HR: 70 (09 Aug 2021 16:42) (55 - 88)  BP: 167/89 (09 Aug 2021 16:42) (115/62 - 202/90)  BP(mean): 115 (08 Aug 2021 23:15) (110 - 138)  RR: 18 (09 Aug 2021 16:42) (14 - 18)  SpO2: 97% (09 Aug 2021 16:42) (95% - 100%)    PHYSICAL EXAM:  GENERAL: NAD, well-developed  HEAD:  Atraumatic, Normocephalic  EYES: EOMI, PERRLA, conjunctiva and sclera clear  NECK: Supple, No JVD  CHEST/LUNG: Clear to auscultation bilaterally; No wheeze  HEART: Regular rate and rhythm; No murmurs, rubs, or gallops  ABDOMEN: Soft, Nontender, Nondistended; Bowel sounds present  EXTREMITIES:  2+ Peripheral Pulses, No clubbing, cyanosis, or edema  PSYCH: AAOx3  NEUROLOGY: non-focal  SKIN: No rashes or lesions    LABS:                        12.1   5.13  )-----------( 149      ( 09 Aug 2021 07:20 )             37.0     08-09    141  |  105  |  13  ----------------------------<  195<H>  3.7   |  24  |  1.01    Ca    8.9      09 Aug 2021 07:19  Phos  2.9     08-09  Mg     2.0     08-09    TPro  6.6  /  Alb  3.4  /  TBili  0.7  /  DBili  x   /  AST  123<H>  /  ALT  149<H>  /  AlkPhos  224<H>  08-09    PT/INR - ( 08 Aug 2021 07:11 )   PT: 11.2 sec;   INR: 0.93 ratio         PTT - ( 08 Aug 2021 07:11 )  PTT:32.4 sec            RADIOLOGY & ADDITIONAL TESTS:    Imaging Personally Reviewed:    Consultant(s) Notes Reviewed:      Care Discussed with Consultants/Other Providers:

## 2021-08-09 NOTE — DISCHARGE NOTE PROVIDER - HOSPITAL COURSE
6M Admitted with dx: epigastric pain x 2 days - ABD us showing cholecystitis -- IV zosyn   On 8/8 s/p lap jose & POD 1 from laparoscopic cholecystectomy and as per surgery cleared for d/c on 8/9.    - Recovering appropriately postop  - Advance diet as tolerated per primary team  - Outer dressing can come off tomorrow, please leave steri strips on  - Pt may follow up with Dr. Beavers, Acute Care Surgery team within 2 weeks after discharge  - Surgery followed for post op instructions as above and as per sx cleared for d/c home.    On 8/9 pt. dc'd home per sx  / d/w Dr. Maldonado

## 2021-08-09 NOTE — DISCHARGE NOTE PROVIDER - NSDCFUADDINST_GEN_ALL_CORE_FT
- Please remove the outer dressing (sticker) on August 10th. Please do not remove the steri strips until pt follows up with Dr. Beavers in the office.   - Please have the pt follow up with Dr. Beavers within 2 weeks after discharge.  - Pt may shower over the dressing, pt may have low fat diet

## 2021-08-09 NOTE — PROGRESS NOTE ADULT - SUBJECTIVE AND OBJECTIVE BOX
[facility]  Anshu Barron PA-C  [address] [place] [id number]  [phone]      INTERVAL HPI/OVERNIGHT EVENTS:  s/p laparoscopic cholecystectomy  no complaints at this time  tolerating diet        Allergies    No Known Allergies    Intolerances          General:  No wt loss, fevers, chills, night sweats, fatigue,   Eyes:  Good vision, no reported pain  ENT:  No sore throat, pain, runny nose, dysphagia  CV:  No pain, palpitations, hypo/hypertension  Resp:  No dyspnea, cough, tachypnea, wheezing  GI:  No pain, No nausea, No vomiting, No diarrhea, No constipation, No weight loss, No fever, No pruritis, No rectal bleeding, No tarry stools, No dysphagia,  :  No pain, bleeding, incontinence, nocturia  Muscle:  No pain, weakness  Neuro:  No weakness, tingling, memory problems  Psych:  No fatigue, insomnia, mood problems, depression  Endocrine:  No polyuria, polydipsia, cold/heat intolerance  Heme:  No petechiae, ecchymosis, easy bruisability  Skin:  No rash, tattoos, scars, edema      PHYSICAL EXAM:   Vital Signs:  Vital Signs Last 24 Hrs  T(C): 36.7 (08 Aug 2021 13:38), Max: 36.7 (08 Aug 2021 00:49)  T(F): 98.1 (08 Aug 2021 13:38), Max: 98.1 (08 Aug 2021 13:38)  HR: 54 (08 Aug 2021 13:38) (54 - 65)  BP: 150/70 (08 Aug 2021 13:38) (105/65 - 150/89)  BP(mean): --  RR: 18 (08 Aug 2021 13:38) (18 - 18)  SpO2: 96% (08 Aug 2021 13:38) (96% - 98%)  Daily     Daily I&O's Summary    07 Aug 2021 07:01  -  08 Aug 2021 07:00  --------------------------------------------------------  IN: 690 mL / OUT: 750 mL / NET: -60 mL    08 Aug 2021 07:01  -  08 Aug 2021 13:39  --------------------------------------------------------  IN: 0 mL / OUT: 200 mL / NET: -200 mL        GENERAL:  Appears stated age, well-groomed, well-nourished, no distress  HEENT:  NC/AT,  conjunctivae clear and pink, no thyromegaly, nodules, adenopathy, no JVD, sclera -anicteric  CHEST:  Full & symmetric excursion, no increased effort, breath sounds clear  HEART:  Regular rhythm, S1, S2, no murmur/rub/S3/S4, no abdominal bruit, no edema  ABDOMEN:  Soft, non-tender, non-distended, normoactive bowel sounds,  no masses ,no hepato-splenomegaly, no signs of chronic liver disease  EXTEREMITIES:  no cyanosis,clubbing or edema  SKIN:  No rash/erythema/ecchymoses/petechiae/wounds/abscess/warm/dry  NEURO:  Alert, oriented, no asterixis, no tremor, no encephalopathy      LABS:                        12.5   3.50  )-----------( 139      ( 08 Aug 2021 07:11 )             38.3     08-08    141  |  106  |  19  ----------------------------<  116<H>  3.7   |  22  |  1.49<H>    Ca    9.4      08 Aug 2021 07:11    TPro  6.8  /  Alb  3.3  /  TBili  0.7  /  DBili  x   /  AST  134<H>  /  ALT  170<H>  /  AlkPhos  268<H>  08-08    PT/INR - ( 08 Aug 2021 07:11 )   PT: 11.2 sec;   INR: 0.93 ratio         PTT - ( 08 Aug 2021 07:11 )  PTT:32.4 sec    amylase   lipase  RADIOLOGY & ADDITIONAL TESTS:

## 2021-08-09 NOTE — PROGRESS NOTE ADULT - ASSESSMENT
ruq pain   inc lfts  acute jose vs cbd stone    s/p laparoscopic cholecystectomy   continue regular diet  surgery following  pain meds as per surgery   ppi once a day  will follow  dw patient     Advanced care planning was discussed with patient and family.  Advanced care planning forms were reviewed and discussed.  Risks, benefits and alternatives of gastroenterologic procedures were discussed in detail and all questions were answered.    30 minutes spent.

## 2021-08-09 NOTE — DISCHARGE NOTE PROVIDER - NSDCMRMEDTOKEN_GEN_ALL_CORE_FT
amLODIPine 5 mg oral tablet: 1 tab(s) orally once a day  Aspirin Enteric Coated 81 mg oral delayed release tablet: 1 tab(s) orally 2 times a day  atorvastatin 80 mg oral tablet: 1 tab(s) orally once a day (at bedtime)  famotidine 20 mg oral tablet: 1 tab(s) orally once a day  Flomax 0.4 mg oral capsule: 1 cap(s) orally once a day (at bedtime)    NOTE: Pharmacy last dispensed on Dec 2020 for 30 days supply  furosemide 20 mg oral tablet: 1 tab(s) orally every 3 days    NOTE: Pharmacy dispensed directions 1 tab once a day  krill oil:   Lopressor 50 mg oral tablet: 1 tab(s) orally every 8 hours  meclizine 25 mg oral tablet: 1 tab(s) orally every 8 hours, As Needed  metFORMIN 500 mg oral tablet: 1 tab(s) orally 2 times a day  Multiple Vitamins oral tablet: 1 tab(s) orally once a day  potassium chloride 20 mEq oral tablet, extended release: 1 tab(s) orally every 3 days    NOTE: Pharmacy dispensed directions 1 tab once a day  Vitamin C:   Vitamin D3:    amLODIPine 5 mg oral tablet: 1 tab(s) orally once a day  Aspirin Enteric Coated 81 mg oral delayed release tablet: 1 tab(s) orally 2 times a day  atorvastatin 80 mg oral tablet: 1 tab(s) orally once a day (at bedtime)  famotidine 20 mg oral tablet: 1 tab(s) orally once a day  Flomax 0.4 mg oral capsule: 1 cap(s) orally once a day (at bedtime)      furosemide 20 mg oral tablet: 1 tab(s) orally every 3 days -   will f/u with PMD       Lopressor 50 mg oral tablet: 1 tab(s) orally every 8 hours  meclizine 25 mg oral tablet: 1 tab(s) orally every 8 hours, As Needed  metFORMIN 500 mg oral tablet: 1 tab(s) orally 2 times a day  potassium chloride 20 mEq oral tablet, extended release: 1 tab(s) orally every 3 days - pt. will f/u   with PMD

## 2021-08-09 NOTE — PROGRESS NOTE ADULT - ASSESSMENT
Assessment:      Plan:      ACS/Trauma Surgery  [id number] Assessment: 76M POD 1 from laparoscopic cholecystectomy    Plan:  - Recovering appropriately postop  - Advance diet as tolerated per primary team  - Outer dressing can come off tomorrow, please leave steri strips on  - Pt may follow up with Dr. Beavers, Acute Care Surgery team within 2 weeks after discharge  - Surgery will now sign off, please page back with questions    ACS/Trauma Surgery  [id number]

## 2021-08-09 NOTE — PROGRESS NOTE ADULT - ASSESSMENT
77M with PMHx of HTN, T2DM, BPH, and CAD (post-CABG 12/2020) presenting with two day history of epigastric pain with radiation to the back. Labs significant for cholestatic transaminitis and on exam patient with RUQ pain to palpation. RUQ sono showing cholelithiasis, & wall thickening. Clinically there is concern for choledocholithiasis and or cholecystitis.    Acute cholecystitis  - s/p lap cholecystectomy     CAD (post-CABG)  - Continue with baby aspirin, statin and beta blocker  - Cardiology follow Dr. Hill    BPH  - Continue with Flomax    T2DM  - Hold home metformin  - FS TID AC & insulin sliding scale     HTN  - Continue to monitor, if elevated start ACEI    DVT prophylaxis    DC home. Medically cleared by surgery for DC. Follow with Surgery in 3-4 days/ PMD. QA    Alessandro Maldonado MD pager [phone]

## 2021-08-09 NOTE — PROGRESS NOTE ADULT - SUBJECTIVE AND OBJECTIVE BOX
Cardiovascular Disease Progress Note    Overnight events: No acute events overnight.  no cp/sob/palps/dizziness  Otherwise review of systems negative    Objective Findings:  T(C): 36.7 (08-09-21 @ 04:44), Max: 36.8 (08-08-21 @ 22:15)  HR: 88 (08-09-21 @ 06:10) (53 - 88)  BP: 145/79 (08-09-21 @ 06:10) (115/62 - 202/90)  RR: 18 (08-09-21 @ 04:44) (14 - 18)  SpO2: 96% (08-09-21 @ 04:44) (95% - 100%)  Wt(kg): --  Daily Height in cm: 172.72 (08 Aug 2021 16:37)    Daily       Physical Exam:  Gen: NAD  HEENT: EOMI  CV: RRR, normal S1 + S2, no m/r/g  Lungs: CTAB  Abd: soft, non-tender. incisions c/d/i  Ext: No edema    Telemetry:    Laboratory Data:                        12.1   5.13  )-----------( 149      ( 09 Aug 2021 07:20 )             37.0     08-08    141  |  106  |  19  ----------------------------<  116<H>  3.7   |  22  |  1.49<H>    Ca    9.4      08 Aug 2021 07:11    TPro  6.8  /  Alb  3.3  /  TBili  0.7  /  DBili  x   /  AST  134<H>  /  ALT  170<H>  /  AlkPhos  268<H>  08-08    PT/INR - ( 08 Aug 2021 07:11 )   PT: 11.2 sec;   INR: 0.93 ratio         PTT - ( 08 Aug 2021 07:11 )  PTT:32.4 sec          Inpatient Medications:  MEDICATIONS  (STANDING):  amLODIPine   Tablet 5 milliGRAM(s) Oral daily  aspirin enteric coated 81 milliGRAM(s) Oral daily  atorvastatin 80 milliGRAM(s) Oral at bedtime  dextrose 40% Gel 15 Gram(s) Oral once  dextrose 5%. 1000 milliLiter(s) (50 mL/Hr) IV Continuous <Continuous>  dextrose 5%. 1000 milliLiter(s) (100 mL/Hr) IV Continuous <Continuous>  dextrose 50% Injectable 25 Gram(s) IV Push once  dextrose 50% Injectable 12.5 Gram(s) IV Push once  dextrose 50% Injectable 25 Gram(s) IV Push once  enoxaparin Injectable 40 milliGRAM(s) SubCutaneous every 24 hours  famotidine    Tablet 20 milliGRAM(s) Oral daily  glucagon  Injectable 1 milliGRAM(s) IntraMuscular once  insulin lispro (ADMELOG) corrective regimen sliding scale   SubCutaneous three times a day before meals  insulin lispro (ADMELOG) corrective regimen sliding scale   SubCutaneous at bedtime  metoprolol tartrate 50 milliGRAM(s) Oral every 8 hours  tamsulosin 0.4 milliGRAM(s) Oral at bedtime      Assessment:  -cardiac clearance  -acute cholecystitis  -CAD s/p 3V CABG 12/2020  -DM  -HTN    Recs:  cardiac status appears stable  no postop cardiac events noted  cw antiplatelet, statin and antianginals  monitor renal fxn and vol status  care per surgery  dvt ppx        Over 25 minutes spent on total encounter; more than 50% of the visit was spent counseling and/or coordinating care by the attending physician.      Singh Hill MD   Cardiovascular Disease  [phone]

## 2021-08-09 NOTE — DISCHARGE NOTE PROVIDER - CARE PROVIDER_API CALL
Chris Yanes)  Surgery; Surgical Critical Care  [address] [place] [address]  Phone: [phone]  Fax: [phone]  Follow Up Time:

## 2022-10-25 NOTE — PHYSICAL THERAPY INITIAL EVALUATION ADULT - DISCHARGE DISPOSITION, PT EVAL
home w/ assist/no skilled PT needs V-Y Flap Text: The defect edges were debeveled with a #15 scalpel blade.  Given the location of the defect, shape of the defect and the proximity to free margins a V-Y flap was deemed most appropriate.  Using a sterile surgical marker, an appropriate advancement flap was drawn incorporating the defect and placing the expected incisions within the relaxed skin tension lines where possible.    The area thus outlined was incised deep to adipose tissue with a #15 scalpel blade.  The skin margins were undermined to an appropriate distance in all directions utilizing iris scissors.
